# Patient Record
Sex: MALE | Race: WHITE | Employment: OTHER | URBAN - METROPOLITAN AREA
[De-identification: names, ages, dates, MRNs, and addresses within clinical notes are randomized per-mention and may not be internally consistent; named-entity substitution may affect disease eponyms.]

---

## 2019-09-02 ENCOUNTER — HOSPITAL ENCOUNTER (EMERGENCY)
Age: 84
Discharge: HOME | End: 2019-09-02
Attending: EMERGENCY MEDICINE
Payer: MEDICARE

## 2019-09-02 ENCOUNTER — APPOINTMENT (OUTPATIENT)
Dept: GENERAL RADIOLOGY | Age: 84
End: 2019-09-02
Payer: MEDICARE

## 2019-09-02 VITALS
OXYGEN SATURATION: 95 % | BODY MASS INDEX: 26.46 KG/M2 | SYSTOLIC BLOOD PRESSURE: 137 MMHG | HEIGHT: 64 IN | HEART RATE: 76 BPM | RESPIRATION RATE: 16 BRPM | TEMPERATURE: 98.3 F | WEIGHT: 155 LBS | DIASTOLIC BLOOD PRESSURE: 73 MMHG

## 2019-09-02 DIAGNOSIS — K64.4 HEMORRHOIDS, EXTERNAL: Primary | ICD-10-CM

## 2019-09-02 DIAGNOSIS — K62.5 RECTAL BLEEDING: ICD-10-CM

## 2019-09-02 LAB
ABO/RH: NORMAL
ABO/RH: NORMAL
ALBUMIN SERPL-MCNC: 3.5 G/DL (ref 3.5–5.2)
ALP BLD-CCNC: 52 U/L (ref 40–129)
ALT SERPL-CCNC: 9 U/L (ref 0–40)
ANION GAP SERPL CALCULATED.3IONS-SCNC: 13 MMOL/L (ref 7–16)
ANTIBODY SCREEN: NORMAL
AST SERPL-CCNC: 17 U/L (ref 0–39)
BASOPHILS ABSOLUTE: 0.09 E9/L (ref 0–0.2)
BASOPHILS RELATIVE PERCENT: 1 % (ref 0–2)
BILIRUB SERPL-MCNC: 0.5 MG/DL (ref 0–1.2)
BUN BLDV-MCNC: 13 MG/DL (ref 8–23)
CALCIUM SERPL-MCNC: 9.6 MG/DL (ref 8.6–10.2)
CHLORIDE BLD-SCNC: 99 MMOL/L (ref 98–107)
CO2: 21 MMOL/L (ref 22–29)
CREAT SERPL-MCNC: 0.9 MG/DL (ref 0.7–1.2)
EOSINOPHILS ABSOLUTE: 0.09 E9/L (ref 0.05–0.5)
EOSINOPHILS RELATIVE PERCENT: 1 % (ref 0–6)
GFR AFRICAN AMERICAN: >60
GFR NON-AFRICAN AMERICAN: >60 ML/MIN/1.73
GLUCOSE BLD-MCNC: 99 MG/DL (ref 74–99)
HCT VFR BLD CALC: 35.5 % (ref 37–54)
HEMOGLOBIN: 10.9 G/DL (ref 12.5–16.5)
INR BLD: 1.1
LACTIC ACID: 1.1 MMOL/L (ref 0.5–2.2)
LYMPHOCYTES ABSOLUTE: 0.09 E9/L (ref 1.5–4)
LYMPHOCYTES RELATIVE PERCENT: 1 % (ref 20–42)
MCH RBC QN AUTO: 29.2 PG (ref 26–35)
MCHC RBC AUTO-ENTMCNC: 30.7 % (ref 32–34.5)
MCV RBC AUTO: 95.2 FL (ref 80–99.9)
MONOCYTES ABSOLUTE: 1.67 E9/L (ref 0.1–0.95)
MONOCYTES RELATIVE PERCENT: 18 % (ref 2–12)
NEUTROPHILS ABSOLUTE: 7.35 E9/L (ref 1.8–7.3)
NEUTROPHILS RELATIVE PERCENT: 79 % (ref 43–80)
PDW BLD-RTO: 14.6 FL (ref 11.5–15)
PLATELET # BLD: 104 E9/L (ref 130–450)
PMV BLD AUTO: 13.3 FL (ref 7–12)
POLYCHROMASIA: ABNORMAL
POTASSIUM SERPL-SCNC: 3.8 MMOL/L (ref 3.5–5)
PROTHROMBIN TIME: 12.5 SEC (ref 9.3–12.4)
RBC # BLD: 3.73 E12/L (ref 3.8–5.8)
SODIUM BLD-SCNC: 133 MMOL/L (ref 132–146)
TOTAL PROTEIN: 7.6 G/DL (ref 6.4–8.3)
WBC # BLD: 9.3 E9/L (ref 4.5–11.5)

## 2019-09-02 PROCEDURE — 93005 ELECTROCARDIOGRAM TRACING: CPT | Performed by: PHYSICIAN ASSISTANT

## 2019-09-02 PROCEDURE — 99285 EMERGENCY DEPT VISIT HI MDM: CPT

## 2019-09-02 PROCEDURE — 80053 COMPREHEN METABOLIC PANEL: CPT

## 2019-09-02 PROCEDURE — 85610 PROTHROMBIN TIME: CPT

## 2019-09-02 PROCEDURE — 83605 ASSAY OF LACTIC ACID: CPT

## 2019-09-02 PROCEDURE — 85025 COMPLETE CBC W/AUTO DIFF WBC: CPT

## 2019-09-02 PROCEDURE — 86901 BLOOD TYPING SEROLOGIC RH(D): CPT

## 2019-09-02 PROCEDURE — 86900 BLOOD TYPING SEROLOGIC ABO: CPT

## 2019-09-02 PROCEDURE — 86850 RBC ANTIBODY SCREEN: CPT

## 2019-09-02 PROCEDURE — 2580000003 HC RX 258: Performed by: PHYSICIAN ASSISTANT

## 2019-09-02 PROCEDURE — 74018 RADEX ABDOMEN 1 VIEW: CPT

## 2019-09-02 RX ORDER — POLYETHYLENE GLYCOL 3350 17 G/17G
17 POWDER, FOR SOLUTION ORAL DAILY
Qty: 510 G | Refills: 0 | Status: SHIPPED | OUTPATIENT
Start: 2019-09-02 | End: 2019-10-02

## 2019-09-02 RX ORDER — DOCUSATE SODIUM 100 MG/1
100 CAPSULE, LIQUID FILLED ORAL 2 TIMES DAILY
Qty: 60 CAPSULE | Refills: 0 | Status: SHIPPED | OUTPATIENT
Start: 2019-09-02 | End: 2019-10-02

## 2019-09-02 RX ORDER — 0.9 % SODIUM CHLORIDE 0.9 %
1000 INTRAVENOUS SOLUTION INTRAVENOUS ONCE
Status: COMPLETED | OUTPATIENT
Start: 2019-09-02 | End: 2019-09-02

## 2019-09-02 RX ADMIN — SODIUM CHLORIDE 1000 ML: 9 INJECTION, SOLUTION INTRAVENOUS at 12:01

## 2019-09-02 ASSESSMENT — PAIN SCALES - GENERAL: PAINLEVEL_OUTOF10: 4

## 2019-09-03 LAB
EKG ATRIAL RATE: 78 BPM
EKG P AXIS: 52 DEGREES
EKG P-R INTERVAL: 202 MS
EKG Q-T INTERVAL: 344 MS
EKG QRS DURATION: 68 MS
EKG QTC CALCULATION (BAZETT): 392 MS
EKG R AXIS: 13 DEGREES
EKG T AXIS: 32 DEGREES
EKG VENTRICULAR RATE: 78 BPM

## 2019-09-03 PROCEDURE — 93010 ELECTROCARDIOGRAM REPORT: CPT | Performed by: INTERNAL MEDICINE

## 2019-09-20 ENCOUNTER — APPOINTMENT (OUTPATIENT)
Dept: CT IMAGING | Age: 84
End: 2019-09-20
Payer: MEDICARE

## 2019-09-20 ENCOUNTER — HOSPITAL ENCOUNTER (EMERGENCY)
Age: 84
Discharge: HOME | End: 2019-09-20
Attending: EMERGENCY MEDICINE
Payer: MEDICARE

## 2019-09-20 ENCOUNTER — APPOINTMENT (OUTPATIENT)
Dept: GENERAL RADIOLOGY | Age: 84
End: 2019-09-20
Payer: MEDICARE

## 2019-09-20 VITALS
BODY MASS INDEX: 26.46 KG/M2 | HEART RATE: 66 BPM | SYSTOLIC BLOOD PRESSURE: 136 MMHG | HEIGHT: 64 IN | WEIGHT: 155 LBS | RESPIRATION RATE: 16 BRPM | TEMPERATURE: 97.5 F | OXYGEN SATURATION: 94 % | DIASTOLIC BLOOD PRESSURE: 72 MMHG
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
## 2019-09-20 DIAGNOSIS — D61.818 PANCYTOPENIA (HCC): ICD-10-CM

## 2019-09-20 DIAGNOSIS — F10.10 ALCOHOL ABUSE: ICD-10-CM
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
## 2019-09-20 DIAGNOSIS — R93.89 ABNORMAL FINDING OF DIAGNOSTIC IMAGING: ICD-10-CM

## 2019-09-20 LAB
ACETAMINOPHEN LEVEL: <5 MCG/ML (ref 10–30)
ALBUMIN SERPL-MCNC: 3.6 G/DL (ref 3.5–5.2)
ALP BLD-CCNC: 63 U/L (ref 40–129)
ALT SERPL-CCNC: 14 U/L (ref 0–40)
AMMONIA: <10 UMOL/L (ref 16–60)
AMPHETAMINE SCREEN, URINE: NOT DETECTED
ANION GAP SERPL CALCULATED.3IONS-SCNC: 16 MMOL/L (ref 7–16)
AST SERPL-CCNC: 24 U/L (ref 0–39)
BACTERIA: NORMAL /HPF
BARBITURATE SCREEN URINE: NOT DETECTED
BASOPHILS ABSOLUTE: 0.01 E9/L (ref 0–0.2)
BASOPHILS RELATIVE PERCENT: 0.3 % (ref 0–2)
BENZODIAZEPINE SCREEN, URINE: NOT DETECTED
BILIRUB SERPL-MCNC: 0.3 MG/DL (ref 0–1.2)
BILIRUBIN URINE: NEGATIVE
BLOOD, URINE: NORMAL
BUN BLDV-MCNC: 19 MG/DL (ref 8–23)
CALCIUM SERPL-MCNC: 9.4 MG/DL (ref 8.6–10.2)
CANNABINOID SCREEN URINE: NOT DETECTED
CHLORIDE BLD-SCNC: 98 MMOL/L (ref 98–107)
CLARITY: CLEAR
CO2: 21 MMOL/L (ref 22–29)
COCAINE METABOLITE SCREEN URINE: NOT DETECTED
COLOR: YELLOW
CREAT SERPL-MCNC: 0.8 MG/DL (ref 0.7–1.2)
EKG ATRIAL RATE: 55 BPM
EKG P AXIS: 58 DEGREES
EKG P-R INTERVAL: 240 MS
EKG Q-T INTERVAL: 434 MS
EKG QRS DURATION: 86 MS
EKG QTC CALCULATION (BAZETT): 415 MS
EKG R AXIS: 18 DEGREES
EKG T AXIS: 33 DEGREES
EKG VENTRICULAR RATE: 55 BPM
EOSINOPHILS ABSOLUTE: 0.01 E9/L (ref 0.05–0.5)
EOSINOPHILS RELATIVE PERCENT: 0.3 % (ref 0–6)
ETHANOL: 161 MG/DL (ref 0–0.08)
GFR AFRICAN AMERICAN: >60
GFR NON-AFRICAN AMERICAN: >60 ML/MIN/1.73
GLUCOSE BLD-MCNC: 101 MG/DL (ref 74–99)
GLUCOSE URINE: NEGATIVE MG/DL
HCT VFR BLD CALC: 34.4 % (ref 37–54)
HEMOGLOBIN: 10.6 G/DL (ref 12.5–16.5)
IMMATURE GRANULOCYTES #: 0.06 E9/L
IMMATURE GRANULOCYTES %: 1.6 % (ref 0–5)
INFLUENZA A BY PCR: NOT DETECTED
INFLUENZA B BY PCR: NOT DETECTED
INR BLD: 1.1
KETONES, URINE: NEGATIVE MG/DL
LEUKOCYTE ESTERASE, URINE: NEGATIVE
LYMPHOCYTES ABSOLUTE: 1.24 E9/L (ref 1.5–4)
LYMPHOCYTES RELATIVE PERCENT: 32.3 % (ref 20–42)
Lab: NORMAL
MCH RBC QN AUTO: 29.5 PG (ref 26–35)
MCHC RBC AUTO-ENTMCNC: 30.8 % (ref 32–34.5)
MCV RBC AUTO: 95.8 FL (ref 80–99.9)
METHADONE SCREEN, URINE: NOT DETECTED
MONOCYTES ABSOLUTE: 1.13 E9/L (ref 0.1–0.95)
MONOCYTES RELATIVE PERCENT: 29.4 % (ref 2–12)
NEUTROPHILS ABSOLUTE: 1.39 E9/L (ref 1.8–7.3)
NEUTROPHILS RELATIVE PERCENT: 36.1 % (ref 43–80)
NITRITE, URINE: NEGATIVE
OPIATE SCREEN URINE: NOT DETECTED
PDW BLD-RTO: 15 FL (ref 11.5–15)
PH UA: 5.5 (ref 5–9)
PHENCYCLIDINE SCREEN URINE: NOT DETECTED
PLATELET # BLD: 95 E9/L (ref 130–450)
PLATELET CONFIRMATION: NORMAL
PMV BLD AUTO: 12.2 FL (ref 7–12)
POTASSIUM SERPL-SCNC: 3.7 MMOL/L (ref 3.5–5)
PROPOXYPHENE SCREEN: NOT DETECTED
PROTEIN UA: NORMAL MG/DL
PROTHROMBIN TIME: 12.5 SEC (ref 9.3–12.4)
RBC # BLD: 3.59 E12/L (ref 3.8–5.8)
RBC UA: NORMAL /HPF (ref 0–2)
REASON FOR REJECTION: NORMAL
REJECTED TEST: NORMAL
SALICYLATE, SERUM: <0.3 MG/DL (ref 0–30)
SODIUM BLD-SCNC: 135 MMOL/L (ref 132–146)
SPECIFIC GRAVITY UA: 1.01 (ref 1–1.03)
TOTAL PROTEIN: 7.8 G/DL (ref 6.4–8.3)
TRICYCLIC ANTIDEPRESSANTS SCREEN SERUM: NEGATIVE NG/ML
TROPONIN: <0.01 NG/ML (ref 0–0.03)
UROBILINOGEN, URINE: 0.2 E.U./DL
WBC # BLD: 3.8 E9/L (ref 4.5–11.5)
WBC UA: NORMAL /HPF (ref 0–5)

## 2019-09-20 PROCEDURE — 82140 ASSAY OF AMMONIA: CPT

## 2019-09-20 PROCEDURE — 99284 EMERGENCY DEPT VISIT MOD MDM: CPT

## 2019-09-20 PROCEDURE — 80307 DRUG TEST PRSMV CHEM ANLYZR: CPT

## 2019-09-20 PROCEDURE — G0480 DRUG TEST DEF 1-7 CLASSES: HCPCS

## 2019-09-20 PROCEDURE — 72125 CT NECK SPINE W/O DYE: CPT

## 2019-09-20 PROCEDURE — 96374 THER/PROPH/DIAG INJ IV PUSH: CPT

## 2019-09-20 PROCEDURE — 85610 PROTHROMBIN TIME: CPT

## 2019-09-20 PROCEDURE — 93010 ELECTROCARDIOGRAM REPORT: CPT | Performed by: INTERNAL MEDICINE

## 2019-09-20 PROCEDURE — 73502 X-RAY EXAM HIP UNI 2-3 VIEWS: CPT

## 2019-09-20 PROCEDURE — 6360000002 HC RX W HCPCS: Performed by: STUDENT IN AN ORGANIZED HEALTH CARE EDUCATION/TRAINING PROGRAM

## 2019-09-20 PROCEDURE — 71045 X-RAY EXAM CHEST 1 VIEW: CPT

## 2019-09-20 PROCEDURE — 70450 CT HEAD/BRAIN W/O DYE: CPT

## 2019-09-20 PROCEDURE — 6370000000 HC RX 637 (ALT 250 FOR IP): Performed by: STUDENT IN AN ORGANIZED HEALTH CARE EDUCATION/TRAINING PROGRAM

## 2019-09-20 PROCEDURE — 81001 URINALYSIS AUTO W/SCOPE: CPT

## 2019-09-20 PROCEDURE — 84484 ASSAY OF TROPONIN QUANT: CPT

## 2019-09-20 PROCEDURE — 80053 COMPREHEN METABOLIC PANEL: CPT

## 2019-09-20 PROCEDURE — 85025 COMPLETE CBC W/AUTO DIFF WBC: CPT

## 2019-09-20 PROCEDURE — 87502 INFLUENZA DNA AMP PROBE: CPT

## 2019-09-20 PROCEDURE — 93005 ELECTROCARDIOGRAM TRACING: CPT | Performed by: STUDENT IN AN ORGANIZED HEALTH CARE EDUCATION/TRAINING PROGRAM

## 2019-09-20 RX ORDER — FOLIC ACID 1 MG/1
1 TABLET ORAL ONCE
Status: COMPLETED | OUTPATIENT
Start: 2019-09-20 | End: 2019-09-20

## 2019-09-20 RX ORDER — THIAMINE HYDROCHLORIDE 100 MG/ML
100 INJECTION, SOLUTION INTRAMUSCULAR; INTRAVENOUS ONCE
Status: COMPLETED | OUTPATIENT
Start: 2019-09-20 | End: 2019-09-20

## 2019-09-20 RX ORDER — ALLOPURINOL 100 MG/1
100 TABLET ORAL DAILY
COMMUNITY
End: 2020-03-20

## 2019-09-20 RX ORDER — LISINOPRIL 10 MG/1
10 TABLET ORAL DAILY
COMMUNITY

## 2019-09-20 RX ADMIN — FOLIC ACID 1 MG: 1 TABLET ORAL at 18:08

## 2019-09-20 RX ADMIN — THIAMINE HYDROCHLORIDE 100 MG: 100 INJECTION, SOLUTION INTRAMUSCULAR; INTRAVENOUS at 18:08

## 2019-09-20 NOTE — ED NOTES
Bed: 05  Expected date:   Expected time:   Means of arrival:   Comments:  Pablo     Allen Tita, BARBARA  69[birth year]28[birth year] [id number]

## 2019-09-20 NOTE — ED PROVIDER NOTES
Patient is an 40-year-old male who presents to ED for evaluation of fall, fatigue. Onset of symptoms earlier this a.m. Patient describes the fall as mechanical in nature. Fall was witnessed by family members. Denies loss of consciousness or trauma to the head/neck. Patient is not on anticoagulation. Patient notes that he was unable to stand after fall secondary to increased fatigue. Denies unilateral weakness, numbness or paresthesias. Denies any pain acute pain. Notes chronic right hip pain unchanged from baseline. Patient is accompanied by wife and son who note that patient has been slurring his words, however, is consistent with his daily alcohol use. Patient notes that he had consumed alcohol-- 2-3 drinks of gin, unable to specify number of ounces. Patient notes that he typically consumes similar amount of alcohol daily. Denies associated diaphoresis, tremor, anxiety or palpitations consistent with acute alcohol withdrawal. Denies any suicidal or homicidal ideations. Denies ingestion of any other substances prior to arrival.     The history is provided by the patient, the spouse, a relative and medical records. Fall   The accident occurred 1 to 2 hours ago. The fall occurred while walking. He landed on a hard floor. Pain location: No acute pain. The patient is experiencing no pain. There was no entrapment after the fall. There was no drug use involved in the accident. There was alcohol use involved in the accident. Pertinent negatives include no visual change, no fever, no numbness, no abdominal pain, no nausea, no vomiting, no hematuria, no headaches, no loss of consciousness and no tingling. He has tried nothing for the symptoms. Review of Systems   Constitutional: Negative for chills, diaphoresis, fatigue and fever. HENT: Negative for congestion, ear pain, rhinorrhea, sinus pressure, sneezing, sore throat and trouble swallowing. Eyes: Negative for photophobia, pain and redness. Respiratory: Negative for cough, chest tightness, shortness of breath and wheezing. Cardiovascular: Negative for chest pain and palpitations. Gastrointestinal: Negative for abdominal distention, abdominal pain, blood in stool, constipation, diarrhea, nausea and vomiting. Endocrine: Negative for polydipsia and polyuria. Genitourinary: Negative for difficulty urinating, dysuria, flank pain, hematuria and urgency. Musculoskeletal: Negative for arthralgias, back pain, myalgias and neck pain. Skin: Negative for rash and wound. Neurological: Negative for tingling, loss of consciousness, weakness, light-headedness, numbness and headaches. Psychiatric/Behavioral: Negative for agitation and confusion. The patient is not nervous/anxious and is not hyperactive. Physical Exam   Constitutional: He is oriented to person, place, and time. He appears well-developed and well-nourished. No distress. Odor of alcoholic beverage noted on breath    HENT:   Head: Normocephalic and atraumatic. Right Ear: External ear normal.   Left Ear: External ear normal.   Mouth/Throat: Oropharynx is clear and moist. No oropharyngeal exudate. No overlying ecchymosis to mastoid region. No hemotympanum. No lacerations, abrasions, step off or deformity noted to the head. Eyes: Pupils are equal, round, and reactive to light. Conjunctivae and EOM are normal. Right eye exhibits no discharge. Left eye exhibits no discharge. Neck: Normal range of motion. Neck supple. No thyromegaly present. No spinous process tenderness to palpation, step off or deformity. No signs of trauma to the neck. Cardiovascular: Normal rate, regular rhythm and normal heart sounds. No murmur heard. Pulmonary/Chest: Effort normal and breath sounds normal. No respiratory distress. He has no wheezes. He has no rales. He exhibits no tenderness. Abdominal: Soft. He exhibits no distension and no mass. There is no tenderness.  There is no rebound

## 2019-09-21 ASSESSMENT — ENCOUNTER SYMPTOMS
DIARRHEA: 0
SORE THROAT: 0
EYE REDNESS: 0
VISUAL CHANGE: 0
ABDOMINAL PAIN: 0
ABDOMINAL DISTENTION: 0
CHEST TIGHTNESS: 0
PHOTOPHOBIA: 0
TROUBLE SWALLOWING: 0
BACK PAIN: 0
SHORTNESS OF BREATH: 0
SINUS PRESSURE: 0
NAUSEA: 0
COUGH: 0
VOMITING: 0
WHEEZING: 0
CONSTIPATION: 0
EYE PAIN: 0
RHINORRHEA: 0
BLOOD IN STOOL: 0

## 2020-03-20 ENCOUNTER — HOSPITAL ENCOUNTER (INPATIENT)
Age: 85
LOS: 11 days | Discharge: HOME | DRG: 177 | End: 2020-03-31
Attending: EMERGENCY MEDICINE | Admitting: INTERNAL MEDICINE
Payer: MEDICARE

## 2020-03-20 ENCOUNTER — APPOINTMENT (OUTPATIENT)
Dept: CT IMAGING | Age: 85
DRG: 177 | End: 2020-03-20
Payer: MEDICARE

## 2020-03-20 ENCOUNTER — APPOINTMENT (OUTPATIENT)
Dept: GENERAL RADIOLOGY | Age: 85
DRG: 177 | End: 2020-03-20
Payer: MEDICARE

## 2020-03-20 PROBLEM — I10 HTN (HYPERTENSION): Status: ACTIVE | Noted: 2020-03-20

## 2020-03-20 PROBLEM — N17.9 ACUTE KIDNEY INJURY (HCC): Status: ACTIVE | Noted: 2020-03-20

## 2020-03-20 PROBLEM — J18.9 PNEUMONIA: Status: ACTIVE | Noted: 2020-03-20

## 2020-03-20 PROBLEM — R73.9 ACUTE HYPERGLYCEMIA: Status: ACTIVE | Noted: 2020-03-20

## 2020-03-20 PROBLEM — D64.9 ANEMIA: Status: ACTIVE | Noted: 2020-03-20

## 2020-03-20 LAB
ADENOVIRUS BY PCR: NOT DETECTED
ALBUMIN SERPL-MCNC: 3.2 G/DL (ref 3.5–5.2)
ALP BLD-CCNC: 64 U/L (ref 40–129)
ALT SERPL-CCNC: 10 U/L (ref 0–40)
AMORPHOUS: ABNORMAL
ANION GAP SERPL CALCULATED.3IONS-SCNC: 19 MMOL/L (ref 7–16)
AST SERPL-CCNC: 26 U/L (ref 0–39)
BACTERIA: ABNORMAL /HPF
BASOPHILS ABSOLUTE: 0 E9/L (ref 0–0.2)
BASOPHILS RELATIVE PERCENT: 0 % (ref 0–2)
BILIRUB SERPL-MCNC: 0.4 MG/DL (ref 0–1.2)
BILIRUBIN URINE: NEGATIVE
BLOOD, URINE: ABNORMAL
BORDETELLA PARAPERTUSSIS BY PCR: NOT DETECTED
BORDETELLA PERTUSSIS BY PCR: NOT DETECTED
BUN BLDV-MCNC: 38 MG/DL (ref 8–23)
CALCIUM SERPL-MCNC: 8.6 MG/DL (ref 8.6–10.2)
CHLAMYDOPHILIA PNEUMONIAE BY PCR: NOT DETECTED
CHLORIDE BLD-SCNC: 99 MMOL/L (ref 98–107)
CK MB: 1.8 NG/ML (ref 0–7.7)
CK MB: 1.8 NG/ML (ref 0–7.7)
CLARITY: CLEAR
CO2: 18 MMOL/L (ref 22–29)
COLOR: YELLOW
CORONAVIRUS 229E BY PCR: NOT DETECTED
CORONAVIRUS HKU1 BY PCR: NOT DETECTED
CORONAVIRUS NL63 BY PCR: NOT DETECTED
CORONAVIRUS OC43 BY PCR: NOT DETECTED
CREAT SERPL-MCNC: 1.5 MG/DL (ref 0.7–1.2)
EKG ATRIAL RATE: 66 BPM
EKG P AXIS: 62 DEGREES
EKG P-R INTERVAL: 192 MS
EKG Q-T INTERVAL: 382 MS
EKG QRS DURATION: 80 MS
EKG QTC CALCULATION (BAZETT): 400 MS
EKG R AXIS: 44 DEGREES
EKG T AXIS: 64 DEGREES
EKG VENTRICULAR RATE: 66 BPM
EOSINOPHILS ABSOLUTE: 0 E9/L (ref 0.05–0.5)
EOSINOPHILS RELATIVE PERCENT: 0 % (ref 0–6)
FERRITIN: 1052 NG/ML
FOLATE: 12.3 NG/ML (ref 4.8–24.2)
GFR AFRICAN AMERICAN: 53
GFR NON-AFRICAN AMERICAN: 44 ML/MIN/1.73
GLUCOSE BLD-MCNC: 117 MG/DL (ref 74–99)
GLUCOSE URINE: NEGATIVE MG/DL
HCT VFR BLD CALC: 25.9 % (ref 37–54)
HEMOGLOBIN: 7.6 G/DL (ref 12.5–16.5)
HUMAN METAPNEUMOVIRUS BY PCR: NOT DETECTED
HUMAN RHINOVIRUS/ENTEROVIRUS BY PCR: NOT DETECTED
IMMATURE GRANULOCYTES #: 0.28 E9/L
IMMATURE GRANULOCYTES %: 3.5 % (ref 0–5)
INFLUENZA A BY PCR: NOT DETECTED
INFLUENZA A BY PCR: NOT DETECTED
INFLUENZA B BY PCR: NOT DETECTED
INFLUENZA B BY PCR: NOT DETECTED
KETONES, URINE: NEGATIVE MG/DL
L. PNEUMOPHILA SEROGP 1 UR AG: NORMAL
LACTIC ACID, SEPSIS: 0.9 MMOL/L (ref 0.5–1.9)
LEUKOCYTE ESTERASE, URINE: NEGATIVE
LIPASE: 280 U/L (ref 13–60)
LYMPHOCYTES ABSOLUTE: 1.16 E9/L (ref 1.5–4)
LYMPHOCYTES RELATIVE PERCENT: 14.6 % (ref 20–42)
MCH RBC QN AUTO: 27.1 PG (ref 26–35)
MCHC RBC AUTO-ENTMCNC: 29.3 % (ref 32–34.5)
MCV RBC AUTO: 92.5 FL (ref 80–99.9)
MONOCYTES ABSOLUTE: 2.13 E9/L (ref 0.1–0.95)
MONOCYTES RELATIVE PERCENT: 26.8 % (ref 2–12)
MYCOPLASMA PNEUMONIAE BY PCR: NOT DETECTED
NEUTROPHILS ABSOLUTE: 4.39 E9/L (ref 1.8–7.3)
NEUTROPHILS RELATIVE PERCENT: 55.1 % (ref 43–80)
NITRITE, URINE: NEGATIVE
PARAINFLUENZA VIRUS 1 BY PCR: NOT DETECTED
PARAINFLUENZA VIRUS 2 BY PCR: NOT DETECTED
PARAINFLUENZA VIRUS 3 BY PCR: NOT DETECTED
PARAINFLUENZA VIRUS 4 BY PCR: NOT DETECTED
PDW BLD-RTO: 16.8 FL (ref 11.5–15)
PH UA: 5.5 (ref 5–9)
PLATELET # BLD: 84 E9/L (ref 130–450)
PLATELET CONFIRMATION: NORMAL
PMV BLD AUTO: 13.6 FL (ref 7–12)
POTASSIUM REFLEX MAGNESIUM: 4.1 MMOL/L (ref 3.5–5)
PROCALCITONIN: 0.59 NG/ML (ref 0–0.08)
PROTEIN UA: 100 MG/DL
RBC # BLD: 2.8 E12/L (ref 3.8–5.8)
RBC UA: ABNORMAL /HPF (ref 0–2)
RESPIRATORY SYNCYTIAL VIRUS BY PCR: NOT DETECTED
SODIUM BLD-SCNC: 136 MMOL/L (ref 132–146)
SPECIFIC GRAVITY UA: 1.02 (ref 1–1.03)
STREP PNEUMONIAE ANTIGEN, URINE: NORMAL
TOTAL CK: 173 U/L (ref 20–200)
TOTAL CK: 186 U/L (ref 20–200)
TOTAL PROTEIN: 7.3 G/DL (ref 6.4–8.3)
TROPONIN: 0.08 NG/ML (ref 0–0.03)
UROBILINOGEN, URINE: 0.2 E.U./DL
VITAMIN B-12: 610 PG/ML (ref 211–946)
WBC # BLD: 8 E9/L (ref 4.5–11.5)
WBC UA: ABNORMAL /HPF (ref 0–5)

## 2020-03-20 PROCEDURE — 71250 CT THORAX DX C-: CPT

## 2020-03-20 PROCEDURE — 87450 HC DIRECT STREP B ANTIGEN: CPT

## 2020-03-20 PROCEDURE — 6360000002 HC RX W HCPCS: Performed by: EMERGENCY MEDICINE

## 2020-03-20 PROCEDURE — 71045 X-RAY EXAM CHEST 1 VIEW: CPT

## 2020-03-20 PROCEDURE — 74177 CT ABD & PELVIS W/CONTRAST: CPT

## 2020-03-20 PROCEDURE — 94761 N-INVAS EAR/PLS OXIMETRY MLT: CPT

## 2020-03-20 PROCEDURE — 93005 ELECTROCARDIOGRAM TRACING: CPT | Performed by: EMERGENCY MEDICINE

## 2020-03-20 PROCEDURE — 84484 ASSAY OF TROPONIN QUANT: CPT

## 2020-03-20 PROCEDURE — 84145 PROCALCITONIN (PCT): CPT

## 2020-03-20 PROCEDURE — 80053 COMPREHEN METABOLIC PANEL: CPT

## 2020-03-20 PROCEDURE — 87040 BLOOD CULTURE FOR BACTERIA: CPT

## 2020-03-20 PROCEDURE — 82728 ASSAY OF FERRITIN: CPT

## 2020-03-20 PROCEDURE — 82550 ASSAY OF CK (CPK): CPT

## 2020-03-20 PROCEDURE — 6370000000 HC RX 637 (ALT 250 FOR IP): Performed by: EMERGENCY MEDICINE

## 2020-03-20 PROCEDURE — 82553 CREATINE MB FRACTION: CPT

## 2020-03-20 PROCEDURE — 99223 1ST HOSP IP/OBS HIGH 75: CPT | Performed by: INTERNAL MEDICINE

## 2020-03-20 PROCEDURE — 85025 COMPLETE CBC W/AUTO DIFF WBC: CPT

## 2020-03-20 PROCEDURE — APPSS45 APP SPLIT SHARED TIME 31-45 MINUTES: Performed by: NURSE PRACTITIONER

## 2020-03-20 PROCEDURE — 82607 VITAMIN B-12: CPT

## 2020-03-20 PROCEDURE — 99285 EMERGENCY DEPT VISIT HI MDM: CPT

## 2020-03-20 PROCEDURE — 96365 THER/PROPH/DIAG IV INF INIT: CPT

## 2020-03-20 PROCEDURE — 83690 ASSAY OF LIPASE: CPT

## 2020-03-20 PROCEDURE — 82746 ASSAY OF FOLIC ACID SERUM: CPT

## 2020-03-20 PROCEDURE — 96366 THER/PROPH/DIAG IV INF ADDON: CPT

## 2020-03-20 PROCEDURE — 2580000003 HC RX 258: Performed by: EMERGENCY MEDICINE

## 2020-03-20 PROCEDURE — 6360000002 HC RX W HCPCS: Performed by: NURSE PRACTITIONER

## 2020-03-20 PROCEDURE — 36415 COLL VENOUS BLD VENIPUNCTURE: CPT

## 2020-03-20 PROCEDURE — 1200000000 HC SEMI PRIVATE

## 2020-03-20 PROCEDURE — 93010 ELECTROCARDIOGRAM REPORT: CPT | Performed by: INTERNAL MEDICINE

## 2020-03-20 PROCEDURE — 6370000000 HC RX 637 (ALT 250 FOR IP): Performed by: NURSE PRACTITIONER

## 2020-03-20 PROCEDURE — 6370000000 HC RX 637 (ALT 250 FOR IP): Performed by: INTERNAL MEDICINE

## 2020-03-20 PROCEDURE — 6360000004 HC RX CONTRAST MEDICATION: Performed by: RADIOLOGY

## 2020-03-20 PROCEDURE — 83605 ASSAY OF LACTIC ACID: CPT

## 2020-03-20 PROCEDURE — 81001 URINALYSIS AUTO W/SCOPE: CPT

## 2020-03-20 PROCEDURE — 2580000003 HC RX 258: Performed by: NURSE PRACTITIONER

## 2020-03-20 PROCEDURE — 0100U HC RESPIRPTHGN MULT REV TRANS & AMP PRB TECH 21 TRGT: CPT

## 2020-03-20 PROCEDURE — 87502 INFLUENZA DNA AMP PROBE: CPT

## 2020-03-20 PROCEDURE — 96375 TX/PRO/DX INJ NEW DRUG ADDON: CPT

## 2020-03-20 RX ORDER — DOCUSATE SODIUM 100 MG/1
100 CAPSULE, LIQUID FILLED ORAL 2 TIMES DAILY PRN
Status: ON HOLD | COMMUNITY
End: 2020-08-14 | Stop reason: HOSPADM

## 2020-03-20 RX ORDER — ONDANSETRON 2 MG/ML
4 INJECTION INTRAMUSCULAR; INTRAVENOUS EVERY 6 HOURS PRN
Status: DISCONTINUED | OUTPATIENT
Start: 2020-03-20 | End: 2020-03-31 | Stop reason: HOSPADM

## 2020-03-20 RX ORDER — DOXAZOSIN 2 MG/1
2 TABLET ORAL DAILY
Status: DISCONTINUED | OUTPATIENT
Start: 2020-03-20 | End: 2020-03-31 | Stop reason: HOSPADM

## 2020-03-20 RX ORDER — 0.9 % SODIUM CHLORIDE 0.9 %
1000 INTRAVENOUS SOLUTION INTRAVENOUS ONCE
Status: COMPLETED | OUTPATIENT
Start: 2020-03-20 | End: 2020-03-20

## 2020-03-20 RX ORDER — LISINOPRIL 5 MG/1
5 TABLET ORAL DAILY
Status: DISCONTINUED | OUTPATIENT
Start: 2020-03-20 | End: 2020-03-23

## 2020-03-20 RX ORDER — DOCUSATE SODIUM 100 MG/1
100 CAPSULE, LIQUID FILLED ORAL 2 TIMES DAILY PRN
Status: DISCONTINUED | OUTPATIENT
Start: 2020-03-20 | End: 2020-03-31 | Stop reason: HOSPADM

## 2020-03-20 RX ORDER — LATANOPROST 50 UG/ML
1 SOLUTION/ DROPS OPHTHALMIC NIGHTLY
COMMUNITY

## 2020-03-20 RX ORDER — IPRATROPIUM BROMIDE AND ALBUTEROL SULFATE 2.5; .5 MG/3ML; MG/3ML
1 SOLUTION RESPIRATORY (INHALATION) EVERY 4 HOURS PRN
Status: DISCONTINUED | OUTPATIENT
Start: 2020-03-20 | End: 2020-03-21

## 2020-03-20 RX ORDER — SODIUM CHLORIDE 0.9 % (FLUSH) 0.9 %
10 SYRINGE (ML) INJECTION PRN
Status: DISCONTINUED | OUTPATIENT
Start: 2020-03-20 | End: 2020-03-31 | Stop reason: HOSPADM

## 2020-03-20 RX ORDER — LISINOPRIL 10 MG/1
10 TABLET ORAL DAILY
Status: DISCONTINUED | OUTPATIENT
Start: 2020-03-20 | End: 2020-03-20

## 2020-03-20 RX ORDER — SODIUM CHLORIDE 0.9 % (FLUSH) 0.9 %
10 SYRINGE (ML) INJECTION EVERY 12 HOURS SCHEDULED
Status: DISCONTINUED | OUTPATIENT
Start: 2020-03-20 | End: 2020-03-31 | Stop reason: HOSPADM

## 2020-03-20 RX ORDER — ACETAMINOPHEN 325 MG/1
650 TABLET ORAL EVERY 6 HOURS PRN
Status: DISCONTINUED | OUTPATIENT
Start: 2020-03-20 | End: 2020-03-31 | Stop reason: HOSPADM

## 2020-03-20 RX ORDER — PROMETHAZINE HYDROCHLORIDE 25 MG/1
12.5 TABLET ORAL EVERY 6 HOURS PRN
Status: DISCONTINUED | OUTPATIENT
Start: 2020-03-20 | End: 2020-03-31 | Stop reason: HOSPADM

## 2020-03-20 RX ORDER — ACETAMINOPHEN 650 MG/1
650 SUPPOSITORY RECTAL EVERY 6 HOURS PRN
Status: DISCONTINUED | OUTPATIENT
Start: 2020-03-20 | End: 2020-03-31 | Stop reason: HOSPADM

## 2020-03-20 RX ORDER — POLYETHYLENE GLYCOL 3350 17 G/17G
17 POWDER, FOR SOLUTION ORAL DAILY PRN
Status: DISCONTINUED | OUTPATIENT
Start: 2020-03-20 | End: 2020-03-31 | Stop reason: HOSPADM

## 2020-03-20 RX ORDER — SODIUM CHLORIDE 9 MG/ML
INJECTION, SOLUTION INTRAVENOUS CONTINUOUS
Status: DISCONTINUED | OUTPATIENT
Start: 2020-03-20 | End: 2020-03-25

## 2020-03-20 RX ORDER — TERAZOSIN 2 MG/1
2 CAPSULE ORAL NIGHTLY
Status: ON HOLD | COMMUNITY
End: 2020-04-28 | Stop reason: HOSPADM

## 2020-03-20 RX ORDER — LATANOPROST 50 UG/ML
1 SOLUTION/ DROPS OPHTHALMIC NIGHTLY
Status: DISCONTINUED | OUTPATIENT
Start: 2020-03-20 | End: 2020-03-31 | Stop reason: HOSPADM

## 2020-03-20 RX ORDER — ACETAMINOPHEN 500 MG
1000 TABLET ORAL ONCE
Status: COMPLETED | OUTPATIENT
Start: 2020-03-20 | End: 2020-03-20

## 2020-03-20 RX ADMIN — ACETAMINOPHEN 1000 MG: 500 TABLET ORAL at 07:45

## 2020-03-20 RX ADMIN — DOXAZOSIN 2 MG: 2 TABLET ORAL at 15:51

## 2020-03-20 RX ADMIN — SODIUM CHLORIDE 1000 ML: 9 INJECTION, SOLUTION INTRAVENOUS at 07:49

## 2020-03-20 RX ADMIN — Medication 10 ML: at 21:45

## 2020-03-20 RX ADMIN — WATER 1 G: 1 INJECTION INTRAMUSCULAR; INTRAVENOUS; SUBCUTANEOUS at 09:19

## 2020-03-20 RX ADMIN — IOPAMIDOL 110 ML: 755 INJECTION, SOLUTION INTRAVENOUS at 08:33

## 2020-03-20 RX ADMIN — LISINOPRIL 5 MG: 5 TABLET ORAL at 21:45

## 2020-03-20 RX ADMIN — ENOXAPARIN SODIUM 30 MG: 30 INJECTION SUBCUTANEOUS at 15:51

## 2020-03-20 RX ADMIN — LATANOPROST 1 DROP: 50 SOLUTION OPHTHALMIC at 21:45

## 2020-03-20 RX ADMIN — SODIUM CHLORIDE: 9 INJECTION, SOLUTION INTRAVENOUS at 15:42

## 2020-03-20 RX ADMIN — ACETAMINOPHEN 650 MG: 325 TABLET ORAL at 21:45

## 2020-03-20 RX ADMIN — AZITHROMYCIN MONOHYDRATE 500 MG: 500 INJECTION, POWDER, LYOPHILIZED, FOR SOLUTION INTRAVENOUS at 09:26

## 2020-03-20 ASSESSMENT — ENCOUNTER SYMPTOMS
NAUSEA: 0
WHEEZING: 0
SINUS PRESSURE: 0
VOMITING: 0
BACK PAIN: 0
SHORTNESS OF BREATH: 1
EYE DISCHARGE: 0
SORE THROAT: 0
DIARRHEA: 1
ABDOMINAL PAIN: 0
EYE REDNESS: 0
COUGH: 1
EYE PAIN: 0

## 2020-03-20 ASSESSMENT — PAIN SCALES - GENERAL
PAINLEVEL_OUTOF10: 0
PAINLEVEL_OUTOF10: 4
PAINLEVEL_OUTOF10: 0
PAINLEVEL_OUTOF10: 0

## 2020-03-20 NOTE — ED NOTES
Pt has mask on that was applied by squad.  Pt placed in isolation with negative air flow     Judith Lucero RN  03/20/20 [id number]

## 2020-03-20 NOTE — ED PROVIDER NOTES
Patient is a 78-year-old male with a past medical history of hypertension presenting to the emergency department with fever and cough. Symptoms moderate in severity. Patient states symptoms started on Saturday with a productive cough and constant since. They have progressed since then and noticed fever starting yesterday. He has taken Tylenol at home and last dose was yesterday. Does not feel short of breath unless he is having a coughing attack. Lives at home with his wife and son. No sick contacts. No recent travel or contact with COVID19 positive patient. He states he has not left his house. Nothing makes his symptoms worse, Robitussin makes his symptoms mildly better. Denies any chest pain, nausea, vomiting, abdominal pain. He did have diarrhea 3 days ago but that is since resolved. Review of Systems   Constitutional: Positive for chills and fever. HENT: Negative for ear pain, sinus pressure and sore throat. Eyes: Negative for pain, discharge and redness. Respiratory: Positive for cough and shortness of breath. Negative for wheezing. Cardiovascular: Negative for chest pain and leg swelling. Gastrointestinal: Positive for diarrhea. Negative for abdominal pain, nausea and vomiting. Genitourinary: Negative for dysuria and frequency. Musculoskeletal: Negative for arthralgias and back pain. Skin: Negative for rash and wound. Neurological: Negative for weakness and headaches. Hematological: Negative for adenopathy. All other systems reviewed and are negative. Physical Exam  Vitals signs and nursing note reviewed. Constitutional:       General: He is not in acute distress. Appearance: He is well-developed. He is ill-appearing. Comments: Shivering   HENT:      Head: Normocephalic and atraumatic. Eyes:      Pupils: Pupils are equal, round, and reactive to light. Neck:      Musculoskeletal: Normal range of motion and neck supple.    Cardiovascular: time.  EKG without evidence of acute STEMI. Patient was flu negative, respiratory panel added on. Lipase was elevated to 80. Patient is a drinker, but him and wife deny everyday drinking. He is having no abdominal pain. CT of the chest was performed as well as CT of the abdomen secondary to his increased lipase and diarrhea a few days prior. CT demonstrated opacities at both lung bases. Suspect likely combination of atelectasis and infiltrate especially on the left. There is also trace pleural effusion. CT of the abdomen redemonstrated the bilateral infiltrates as well as hepatomegaly with possible liver disease. On repeat exam he still had no abdominal pain, no right upper quadrant tenderness. With patient symptoms and CT demonstrating bilateral pneumonia, symptoms most likely secondary to this. COVID19 was considered but with patient's diagnosis of bilateral pneumonia, testing has not been performed. Educated patient and wife about symptoms, diagnosis and need to stay in the hospital for further evaluation and care. Case discussed with Dr. Shola Henley and with patient having a source of his respiratory symptoms, he does not need to go to the [facility] floor. Patient had seen Dr. Jean Marie Goldman in the past but has not seen him for 5 years and [facility] Consult placed to hospitalist for admission who accepted. Informed patient and wife of current diagnosis and plan for admission. They verbalized understanding and were agreeable. Patient was admitted. ED Course as of Mar 20 1242   Fri Mar 20, 2020   0815 Most likely secondary to VALERIE. No chest pain at this current time. Troponin(!): 0.08 [KP]   [id number] Patient reevaluated, wife is here. He is ANO x3. He states nobody at home has any similar symptoms. No one at home is sick. They have no sick contacts. [KP]   0844 Reevaluated, and her x3. Lethargic but arousable.   Updated wife and patient about work-up and need to stay in the hospital.  They verbalized understanding are agreeable with the plan. []      ED Course User Index  [] Marifer Villar, DO        ----------------------------------------------- PAST HISTORY --------------------------------------------  Past Medical History:  has a past medical history of Hypertension. Past Surgical History:  has a past surgical history that includes hernia repair; joint replacement (Bilateral); Carpal tunnel release (Bilateral); eye surgery; and back surgery. Social History:  reports that he quit smoking about 40 years ago. His smoking use included cigarettes. He has quit using smokeless tobacco. He reports current alcohol use of about 2.0 standard drinks of alcohol per week. He reports that he does not use drugs. Family History: family history includes Cancer in his father; Heart Attack in his brother. The patients home medications have been reviewed. Allergies: Patient has no known allergies.     ------------------------------------------------ RESULTS ---------------------------------------------------    LABS:  Results for orders placed or performed during the hospital encounter of 03/20/20   Rapid influenza A/B antigens   Result Value Ref Range    Influenza A by PCR Not Detected Not Detected    Influenza B by PCR Not Detected Not Detected   Respiratory Panel, Molecular   Result Value Ref Range    Adenovirus by PCR Not Detected Not Detected    Bordetella parapertussis by PCR Not Detected Not Detected    Bordetella pertussis by PCR Not Detected Not Detected    Chlamydophilia pneumoniae by PCR Not Detected Not Detected    Coronavirus 229E by PCR Not Detected Not Detected    Coronavirus HKU1 by PCR Not Detected Not Detected    Coronavirus NL63 by PCR Not Detected Not Detected    Coronavirus OC43 by PCR Not Detected Not Detected    Human Metapneumovirus by PCR Not Detected Not Detected    Human Rhinovirus/Enterovirus by PCR Not Detected Not Detected    Influenza A by PCR Not Detected Not Consultation:  I Spoke with Dr. Ovid Harada (Medicine). Discussed case. They will admit this patient. Emil Powell PROCEDURES:            none. COUNSELING:   I have spoken with the spouse and patient and discussed todays results, in addition to providing specific details for the plan of care and counseling regarding the diagnosis and prognosis.     --------------------------------------- IMPRESSION & DISPOSITION --------------------------------     IMPRESSION(s):  1. Pneumonia due to organism    2. VALERIE (acute kidney injury) ([facility] [address]    3. Elevated troponin    4. Thrombocytopenia ([facility] [address]    5. Anemia, unspecified type        This patient's ED course included: a personal history and physicial examination, re-evaluation prior to disposition, multiple bedside re-evaluations, IV medications, cardiac monitoring and continuous pulse oximetry    This patient has been closely monitored during their ED course. DISPOSITION:  Disposition: Admit to med/surg floor. Patient condition is serious. END OF PROVIDER NOTE.          Danny Quinones DO  Resident  03/20/20 0432

## 2020-03-20 NOTE — H&P
2020    BACTERIA FEW 2020    CLARITYU Clear 2020    SPECGRAV 1.020 2020    LEUKOCYTESUR Negative 2020    UROBILINOGEN 0.2 2020    BILIRUBINUR Negative 2020    BLOODU MODERATE 2020    GLUCOSEU Negative 2020    AMORPHOUS MANY 2020       Radiology: Ct Chest Wo Contrast    Result Date: 3/20/2020  Patient MRN:  [id number] Patient :  3/10/1931 Patient Age:  80 years Patient Gender:  Male Order Date:3/20/2020 7:30 AM EXAM:  CT CHEST WO CONTRAST NUMBER OF IMAGES:  257 INDICATION:   cough, fever COMPARISON: None. TECHNIQUE: Multiple axial images were obtained from the apices of the lungs through the lung bases. Sagittal and coronal reconstructions performed for aid in interpretation of the study. Technique: Low-dose CT  acquisition technique included one of following options; 1 . Automated exposure control, 2. Adjustment of MA and or KV according to patient's size or 3. Use of iterative reconstruction. FINDINGS: LUNGS: There are opacities at both lower lobes. I suspect that there is likely a complement of infiltrate as well as atelectasis. This is especially so on the left. There is trace pleural effusion on the left. HEART: Unremarkable. AORTA:  Unremarkable. MEDIASTINUM:  Nonspecific subcarinal adenopathy present. Vernestine Ket UPPER ABDOMEN: Benign appearing left renal cyst present. OTHER:Unremarkable     Opacities at both lung bases. Suspect a likely combination of atelectasis and infiltrate, especially on the left. Trace left pleural effusion. See above. Ct Abdomen Pelvis W Iv Contrast Additional Contrast? None    Result Date: 3/20/2020  Patient MRN:  [id number] : 3/10/1931 Age: 80 years Gender: Male Order Date:  3/20/2020 8:00 AM EXAM: CT ABDOMEN PELVIS W IV CONTRAST number of images 333. Contrast. Isovue-370, 110 mL intravenously. Technique: Low-dose CT  acquisition technique included one of following options; 1 . Automated exposure control, 2.  Adjustment of MA and

## 2020-03-20 NOTE — CARE COORDINATION
Social Work / Discharge Planning : SW  Spoke to patient spouse for initial assessment. Patient resides with his spouse loida two [facility]. Patient uses a wheeled walker. Patient has a hx with [employer] and [address] [place] [address]. Patient currently on 02 and this is NEW. Patient does NOT have a nebulizer. If needed at discharge, they do NOT have a DME preference. Discharge goal is HOME. Await therapy input when appropriate. SW to follow.  Electronically signed by HAYLEY Gordon on 3/20/20 at 2:50 PM EDT

## 2020-03-21 ENCOUNTER — APPOINTMENT (OUTPATIENT)
Dept: GENERAL RADIOLOGY | Age: 85
DRG: 177 | End: 2020-03-21
Payer: MEDICARE

## 2020-03-21 LAB
ALBUMIN SERPL-MCNC: 2.6 G/DL (ref 3.5–5.2)
ALP BLD-CCNC: 48 U/L (ref 40–129)
ALT SERPL-CCNC: 10 U/L (ref 0–40)
ANION GAP SERPL CALCULATED.3IONS-SCNC: 13 MMOL/L (ref 7–16)
ANISOCYTOSIS: ABNORMAL
AST SERPL-CCNC: 24 U/L (ref 0–39)
BASOPHILS ABSOLUTE: 0 E9/L (ref 0–0.2)
BASOPHILS RELATIVE PERCENT: 0 % (ref 0–2)
BILIRUB SERPL-MCNC: <0.2 MG/DL (ref 0–1.2)
BUN BLDV-MCNC: 29 MG/DL (ref 8–23)
CALCIUM SERPL-MCNC: 7 MG/DL (ref 8.6–10.2)
CHLORIDE BLD-SCNC: 109 MMOL/L (ref 98–107)
CO2: 16 MMOL/L (ref 22–29)
CREAT SERPL-MCNC: 1.1 MG/DL (ref 0.7–1.2)
EOSINOPHILS ABSOLUTE: 0 E9/L (ref 0.05–0.5)
EOSINOPHILS RELATIVE PERCENT: 0 % (ref 0–6)
GFR AFRICAN AMERICAN: >60
GFR NON-AFRICAN AMERICAN: >60 ML/MIN/1.73
GLUCOSE BLD-MCNC: 81 MG/DL (ref 74–99)
HCT VFR BLD CALC: 26.8 % (ref 37–54)
HEMOGLOBIN: 7.6 G/DL (ref 12.5–16.5)
IRON SATURATION: 13 % (ref 20–55)
IRON: 16 MCG/DL (ref 59–158)
LIPASE: 274 U/L (ref 13–60)
LYMPHOCYTES ABSOLUTE: 1.05 E9/L (ref 1.5–4)
LYMPHOCYTES RELATIVE PERCENT: 14.8 % (ref 20–42)
MAGNESIUM: 1.6 MG/DL (ref 1.6–2.6)
MCH RBC QN AUTO: 26.8 PG (ref 26–35)
MCHC RBC AUTO-ENTMCNC: 28.4 % (ref 32–34.5)
MCV RBC AUTO: 94.4 FL (ref 80–99.9)
MONOCYTES ABSOLUTE: 2.59 E9/L (ref 0.1–0.95)
MONOCYTES RELATIVE PERCENT: 36.5 % (ref 2–12)
NEUTROPHILS ABSOLUTE: 3.43 E9/L (ref 1.8–7.3)
NEUTROPHILS RELATIVE PERCENT: 48.7 % (ref 43–80)
NUCLEATED RED BLOOD CELLS: 0 /100 WBC
PDW BLD-RTO: 16.9 FL (ref 11.5–15)
PHOSPHORUS: 3.1 MG/DL (ref 2.5–4.5)
PLATELET # BLD: 81 E9/L (ref 130–450)
PLATELET CONFIRMATION: NORMAL
PMV BLD AUTO: 14.5 FL (ref 7–12)
POTASSIUM REFLEX MAGNESIUM: 3.5 MMOL/L (ref 3.5–5)
RBC # BLD: 2.84 E12/L (ref 3.8–5.8)
SODIUM BLD-SCNC: 138 MMOL/L (ref 132–146)
TOTAL IRON BINDING CAPACITY: 122 MCG/DL (ref 250–450)
TOTAL PROTEIN: 5.7 G/DL (ref 6.4–8.3)
WBC # BLD: 7 E9/L (ref 4.5–11.5)

## 2020-03-21 PROCEDURE — 6360000002 HC RX W HCPCS: Performed by: EMERGENCY MEDICINE

## 2020-03-21 PROCEDURE — 6370000000 HC RX 637 (ALT 250 FOR IP): Performed by: INTERNAL MEDICINE

## 2020-03-21 PROCEDURE — 84100 ASSAY OF PHOSPHORUS: CPT

## 2020-03-21 PROCEDURE — 2700000000 HC OXYGEN THERAPY PER DAY

## 2020-03-21 PROCEDURE — 6360000002 HC RX W HCPCS: Performed by: INTERNAL MEDICINE

## 2020-03-21 PROCEDURE — 85025 COMPLETE CBC W/AUTO DIFF WBC: CPT

## 2020-03-21 PROCEDURE — 36415 COLL VENOUS BLD VENIPUNCTURE: CPT

## 2020-03-21 PROCEDURE — 71045 X-RAY EXAM CHEST 1 VIEW: CPT

## 2020-03-21 PROCEDURE — 1200000000 HC SEMI PRIVATE

## 2020-03-21 PROCEDURE — 2580000003 HC RX 258: Performed by: NURSE PRACTITIONER

## 2020-03-21 PROCEDURE — 6360000002 HC RX W HCPCS: Performed by: NURSE PRACTITIONER

## 2020-03-21 PROCEDURE — 87070 CULTURE OTHR SPECIMN AEROBIC: CPT

## 2020-03-21 PROCEDURE — 2580000003 HC RX 258: Performed by: EMERGENCY MEDICINE

## 2020-03-21 PROCEDURE — 94664 DEMO&/EVAL PT USE INHALER: CPT

## 2020-03-21 PROCEDURE — 83735 ASSAY OF MAGNESIUM: CPT

## 2020-03-21 PROCEDURE — 80053 COMPREHEN METABOLIC PANEL: CPT

## 2020-03-21 PROCEDURE — 87081 CULTURE SCREEN ONLY: CPT

## 2020-03-21 PROCEDURE — APPSS30 APP SPLIT SHARED TIME 16-30 MINUTES: Performed by: NURSE PRACTITIONER

## 2020-03-21 PROCEDURE — 87206 SMEAR FLUORESCENT/ACID STAI: CPT

## 2020-03-21 PROCEDURE — 2580000003 HC RX 258: Performed by: INTERNAL MEDICINE

## 2020-03-21 PROCEDURE — 6370000000 HC RX 637 (ALT 250 FOR IP): Performed by: NURSE PRACTITIONER

## 2020-03-21 PROCEDURE — 87040 BLOOD CULTURE FOR BACTERIA: CPT

## 2020-03-21 PROCEDURE — 87088 URINE BACTERIA CULTURE: CPT

## 2020-03-21 PROCEDURE — 83550 IRON BINDING TEST: CPT

## 2020-03-21 PROCEDURE — 87205 SMEAR GRAM STAIN: CPT

## 2020-03-21 PROCEDURE — 83690 ASSAY OF LIPASE: CPT

## 2020-03-21 PROCEDURE — 94640 AIRWAY INHALATION TREATMENT: CPT

## 2020-03-21 PROCEDURE — 6370000000 HC RX 637 (ALT 250 FOR IP): Performed by: SPECIALIST

## 2020-03-21 PROCEDURE — 99232 SBSQ HOSP IP/OBS MODERATE 35: CPT | Performed by: INTERNAL MEDICINE

## 2020-03-21 PROCEDURE — 83540 ASSAY OF IRON: CPT

## 2020-03-21 RX ORDER — IPRATROPIUM BROMIDE AND ALBUTEROL SULFATE 2.5; .5 MG/3ML; MG/3ML
1 SOLUTION RESPIRATORY (INHALATION) EVERY 4 HOURS
Status: DISCONTINUED | OUTPATIENT
Start: 2020-03-21 | End: 2020-03-31 | Stop reason: HOSPADM

## 2020-03-21 RX ORDER — IPRATROPIUM BROMIDE AND ALBUTEROL SULFATE 2.5; .5 MG/3ML; MG/3ML
1 SOLUTION RESPIRATORY (INHALATION)
Status: DISCONTINUED | OUTPATIENT
Start: 2020-03-21 | End: 2020-03-21

## 2020-03-21 RX ORDER — DOXYCYCLINE HYCLATE 100 MG/1
100 CAPSULE ORAL EVERY 12 HOURS SCHEDULED
Status: DISCONTINUED | OUTPATIENT
Start: 2020-03-21 | End: 2020-03-31 | Stop reason: HOSPADM

## 2020-03-21 RX ADMIN — ENOXAPARIN SODIUM 30 MG: 30 INJECTION SUBCUTANEOUS at 10:12

## 2020-03-21 RX ADMIN — IPRATROPIUM BROMIDE AND ALBUTEROL SULFATE 1 AMPULE: 2.5; .5 SOLUTION RESPIRATORY (INHALATION) at 12:31

## 2020-03-21 RX ADMIN — IPRATROPIUM BROMIDE AND ALBUTEROL SULFATE 1 AMPULE: 2.5; .5 SOLUTION RESPIRATORY (INHALATION) at 17:23

## 2020-03-21 RX ADMIN — ACETAMINOPHEN 650 MG: 325 TABLET ORAL at 18:52

## 2020-03-21 RX ADMIN — SODIUM CHLORIDE: 9 INJECTION, SOLUTION INTRAVENOUS at 09:59

## 2020-03-21 RX ADMIN — Medication 10 ML: at 21:00

## 2020-03-21 RX ADMIN — ACETAMINOPHEN 650 MG: 325 TABLET ORAL at 10:12

## 2020-03-21 RX ADMIN — AMPICILLIN AND SULBACTAM 3 G: 1; 2 INJECTION, POWDER, FOR SOLUTION INTRAMUSCULAR; INTRAVENOUS at 20:22

## 2020-03-21 RX ADMIN — DOXYCYCLINE HYCLATE 100 MG: 100 CAPSULE ORAL at 20:22

## 2020-03-21 RX ADMIN — DOXAZOSIN 2 MG: 2 TABLET ORAL at 10:13

## 2020-03-21 RX ADMIN — IPRATROPIUM BROMIDE AND ALBUTEROL SULFATE 1 AMPULE: 2.5; .5 SOLUTION RESPIRATORY (INHALATION) at 21:19

## 2020-03-21 RX ADMIN — AMPICILLIN AND SULBACTAM 3 G: 1; 2 INJECTION, POWDER, FOR SOLUTION INTRAMUSCULAR; INTRAVENOUS at 14:23

## 2020-03-21 RX ADMIN — AZITHROMYCIN MONOHYDRATE 500 MG: 500 INJECTION, POWDER, LYOPHILIZED, FOR SOLUTION INTRAVENOUS at 09:58

## 2020-03-21 RX ADMIN — LATANOPROST 1 DROP: 50 SOLUTION OPHTHALMIC at 20:22

## 2020-03-21 ASSESSMENT — PAIN SCALES - GENERAL
PAINLEVEL_OUTOF10: 0

## 2020-03-21 NOTE — CONSULTS
[address] [place] [address] [facility]  Consultation Note     Admit Date: 3/20/2020  6:24 AM    Reason for Consult:   Aspiration pneumonia    Attending Physician:  Jacqueline Morales MD    HISTORY OF PRESENT ILLNESS:             The history is obtained from extensive review of available past medical records. The patient is a 80 y.o. male who presents to the ED on 3/20/2020 complaining of fever and cough. He had a normal white count. Respiratory panel and rapid influenza were negative. Urinary antigens were negative. He was treated with Azithromycin and Ceftriaxone and admitted to the hospital.  Seen in consultation by pulmonary who in turn asked an ID consultation and change antibiotics to Unasyn. Past Medical History:        Diagnosis Date    Hypertension      Past Surgical History:        Procedure Laterality Date    BACK SURGERY      CARPAL TUNNEL RELEASE Bilateral     EYE SURGERY      HERNIA REPAIR      JOINT REPLACEMENT Bilateral     hips     Current Medications:   Scheduled Meds:   ipratropium-albuterol  1 ampule Inhalation Q4H    ampicillin-sulbactam  3 g Intravenous Q6H    latanoprost  1 drop Right Eye Nightly    doxazosin  2 mg Oral Daily    sodium chloride flush  10 mL Intravenous 2 times per day    enoxaparin  30 mg Subcutaneous Daily    lisinopril  5 mg Oral Daily     Continuous Infusions:   sodium chloride 50 mL/hr at 03/21/20 0959     PRN Meds:docusate sodium, sodium chloride flush, acetaminophen **OR** acetaminophen, polyethylene glycol, promethazine **OR** ondansetron    Allergies:  Patient has no known allergies.     Social History:   Social History     Socioeconomic History    Marital status:      Spouse name: None    Number of children: None    Years of education: None    Highest education level: None   Occupational History    None   Social Needs    Financial resource strain: None    Food insecurity     Worry: None     Inability: None    Constitutional: The patient is awake, alert, and oriented. No distress. Skin: Warm and dry. No rashes were noted. HEENT: Eyes show round, and reactive pupils. No jaundice. Moist mucous membranes, no ulcerations, no thrush. Neck: Supple to movements. No lymphadenopathy. Chest: No use of accessory muscles to breathe. Symmetrical expansion. Auscultation reveals crackles over the left base posteriorly with bronchial sounds and egophony. Cardiovascular: S1 and S2 are rhythmic and regular. No murmurs appreciated. Abdomen: Positive bowel sounds to auscultation. Benign to palpation. No masses felt. No hepatosplenomegaly. Extremities: No clubbing, no cyanosis, no edema.   Lines: peripheral      CBC+dif:  Recent Labs     03/20/20  0652 03/21/20  0705   WBC 8.0 7.0   HGB 7.6* 7.6*   HCT 25.9* 26.8*   MCV 92.5 94.4   PLT 84* 81*   NEUTROABS 4.39 3.43     No results found for: CRP   No results found for: CRPHS  No results found for: SEDRATE  Lab Results   Component Value Date    ALT 10 03/21/2020    AST 24 03/21/2020    ALKPHOS 48 03/21/2020    BILITOT <0.2 03/21/2020     Lab Results   Component Value Date     03/21/2020    K 3.5 03/21/2020     03/21/2020    CO2 16 03/21/2020    BUN 29 03/21/2020    CREATININE 1.1 03/21/2020    GFRAA >60 03/21/2020    LABGLOM >60 03/21/2020    GLUCOSE 81 03/21/2020    PROT 5.7 03/21/2020    LABALBU 2.6 03/21/2020    CALCIUM 7.0 03/21/2020    BILITOT <0.2 03/21/2020    ALKPHOS 48 03/21/2020    AST 24 03/21/2020    ALT 10 03/21/2020       Lab Results   Component Value Date    PROTIME 12.5 09/20/2019    INR 1.1 09/20/2019       No results found for: TSH    Lab Results   Component Value Date    COLORU Yellow 03/20/2020    PHUR 5.5 03/20/2020    WBCUA NONE 03/20/2020    RBCUA 5-10 03/20/2020    BACTERIA FEW 03/20/2020    CLARITYU Clear 03/20/2020    SPECGRAV 1.020 03/20/2020    LEUKOCYTESUR Negative 03/20/2020    UROBILINOGEN 0.2 03/20/2020    BILIRUBINUR Negative 03/20/2020

## 2020-03-21 NOTE — PROGRESS NOTES
Patient has low grade fever 99.6. He is sweating and shaking and states \"I have never felt this way before in my life. \" House doctor called and notified. I was told to give him tylenol and wait for blood culture results to come back. I also informed the doctor about patients high BP. He will be ordering lisinopril. See MAR. Will continue to monitor patient.

## 2020-03-21 NOTE — CONSULTS
Abd: Non-tender. Non-distended. No masses. No organmegaly. Normal bowel sounds. Skin: Warm and dry. No nodule on exposed extremities. No rash on exposed extremities. Lymph: No cervical LAD. No supraclavicular LAD. Ext: No joint deformity. No clubbing. No cyanosis. No edema  Neuro: Awake. Follows commands. Positive pupils/gag/corneals. Normal pain response. Lab Results:  CBC:   Recent Labs     03/20/20  0652 03/21/20  0705   WBC 8.0 7.0   HGB 7.6* 7.6*   HCT 25.9* 26.8*   MCV 92.5 94.4   PLT 84* 81*       BMP:  Recent Labs     03/20/20  0652 03/21/20  0335    138   K 4.1 3.5   CL 99 109*   CO2 18* 16*   PHOS  --  3.1   BUN 38* 29*   CREATININE 1.5* 1.1    ALB:3,BILIDIR:3,BILITOT:3,ALKPHOS:3)@    PT/INR: No results for input(s): PROTIME, INR in the last 72 hours. Cultures:  Sputum: not available  Blood: not available    ABG:   No results for input(s): PH, PO2, PCO2, HCO3, BE, O2SAT, METHB, O2HB, COHB, O2CON, HHB, THB in the last 72 hours. Films:     CT CHEST WO CONTRAST   Final Result   Opacities at both lung bases. Suspect a likely combination of   atelectasis and infiltrate, especially on the left. Trace left pleural   effusion. See above. CT ABDOMEN PELVIS W IV CONTRAST Additional Contrast? None   Final Result   Patchy bibasilar infiltrates and small pleural effusion which may be   due to pneumonia or edema. Hepatomegaly with  possible liver disease. Consider hepatobiliary scan   for evaluation of gallbladder. XR CHEST PORTABLE   Final Result   Developing perihilar airspace disease as noted. This may represent   edema or inflammatory infiltrate. XR CHEST PORTABLE    (Results Pending)   . Assessment:  1. Right basilar infiltrates, left greater than right with an elevated procalcitonin. Aspiration is strongly suspected.   Covid 19 is thought to be less likely given the patient's lack of an exposure history and the level of procalcitonin. Plan:  1. Changed to Unasyn  2. Pulmonary toilet, swallowing evaluation  3. ID consultation per primary service      Thanks for letting us see this patient in consultation. Please contact us with any questions. Office [phone] or after hours through [email or web address], x [phone]. Please note that voice recognition technology was used in the preparation of this note and make therefore it may contain inadvertent transcription errors    Monalisa Bey M.D., F.C.C.P.     [facility], [address] [facility], [address] [place] [address]

## 2020-03-21 NOTE — PROGRESS NOTES
[facility] Hospitalist   Progress Note    Admitting Date and Time: 3/20/2020  6:24 AM  Admit Dx: Pneumonia [J18.9]  Pneumonia [J18.9]    Subjective:    Patient was admitted with Pneumonia [J18.9]  Pneumonia [J18.9]. Patient seen after blood cultures were drawn. Patient oxygen saturation dropped while on 2L NC and required )2 5L NC to be applied. Patient continues to be febrile. No complaints of discomfort. Patient complains of fatigue and weakness. ROS: denies fever, chills, cp, sob, n/v, HA unless stated above.      azithromycin  500 mg Intravenous Q24H    latanoprost  1 drop Right Eye Nightly    doxazosin  2 mg Oral Daily    sodium chloride flush  10 mL Intravenous 2 times per day    enoxaparin  30 mg Subcutaneous Daily    cefTRIAXone (ROCEPHIN) IV  1 g Intravenous Q24H    lisinopril  5 mg Oral Daily     docusate sodium, 100 mg, BID PRN  sodium chloride flush, 10 mL, PRN  acetaminophen, 650 mg, Q6H PRN    Or  acetaminophen, 650 mg, Q6H PRN  polyethylene glycol, 17 g, Daily PRN  promethazine, 12.5 mg, Q6H PRN    Or  ondansetron, 4 mg, Q6H PRN  ipratropium-albuterol, 1 ampule, Q4H PRN         Objective:    BP (!) 158/78   Pulse 91   Temp 103 °F (39.4 °C) (Axillary)   Resp 20   Ht 5' 4\" (1.626 m)   Wt 139 lb (63 kg)   SpO2 92%   BMI 23.86 kg/m²   General Appearance: in no acute distress, alert, frail-appearing and cachectic  Skin: warm and dry  Head: normocephalic and atraumatic  Eyes: pupils equal, round, and reactive to light and conjunctivae normal  ENT: hearing grossly normal bilaterally  Neck: neck supple and non tender without mass   Pulmonary/Chest: wheezing, rales present- moist cough and decreased breath sounds noted throughout  Cardiovascular: normal rate, regular rhythm and intact distal pulses  Abdomen: soft, non-tender, non-distended, normal bowel sounds, no masses or organomegaly  Extremities: no cyanosis, no clubbing and no edema  Musculoskeletal: no swollen joints, no joint deformity and no tender joints  Neurologic: no cranial nerve deficit and speech normal      Recent Labs     03/20/20  0652 03/21/20  0335    138   K 4.1 3.5   CL 99 109*   CO2 18* 16*   BUN 38* 29*   CREATININE 1.5* 1.1   GLUCOSE 117* 81   CALCIUM 8.6 7.0*       Recent Labs     03/20/20  0652 03/21/20  0705   WBC 8.0 7.0   RBC 2.80* 2.84*   HGB 7.6* 7.6*   HCT 25.9* 26.8*   MCV 92.5 94.4   MCH 27.1 26.8   MCHC 29.3* 28.4*   RDW 16.8* 16.9*   PLT 84* 81*   MPV 13.6* 14.5*         Radiology:   CT CHEST WO CONTRAST   Final Result   Opacities at both lung bases. Suspect a likely combination of   atelectasis and infiltrate, especially on the left. Trace left pleural   effusion. See above. CT ABDOMEN PELVIS W IV CONTRAST Additional Contrast? None   Final Result   Patchy bibasilar infiltrates and small pleural effusion which may be   due to pneumonia or edema. Hepatomegaly with  possible liver disease. Consider hepatobiliary scan   for evaluation of gallbladder. XR CHEST PORTABLE   Final Result   Developing perihilar airspace disease as noted. This may represent   edema or inflammatory infiltrate. XR CHEST PORTABLE    (Results Pending)       Assessment:    Principal Problem:    Pneumonia due to organism  Active Problems:    Anemia    HTN (hypertension)    Acute kidney injury ([facility] [address]    Acute hyperglycemia    Community acquired pneumonia    Elevated troponin  Resolved Problems:    * No resolved hospital problems. *      Plan:    1. Pneumonia - possible aspiration d/t difficulty swallowing - may need barium swallow eval - IV Rocephin and Doxycycline - cough medication  - IVF's - contact and droplet isolation - respiratory panel negative - fever (Tylenol) continued - O2 sat dropped on 2L NC to 80% - increased to 5L NC - made treatments every 4 hrs - blood culture pending - pulmonary and ID both consulted - continue monitor closely    2.  Acute Kidney Injury -

## 2020-03-22 LAB
ABO/RH: NORMAL
ABO/RH: NORMAL
ANION GAP SERPL CALCULATED.3IONS-SCNC: 14 MMOL/L (ref 7–16)
ANTIBODY IDENTIFICATION: NORMAL
ANTIBODY SCREEN: NORMAL
BUN BLDV-MCNC: 26 MG/DL (ref 8–23)
CALCIUM SERPL-MCNC: 8.4 MG/DL (ref 8.6–10.2)
CHLORIDE BLD-SCNC: 105 MMOL/L (ref 98–107)
CO2: 18 MMOL/L (ref 22–29)
CREAT SERPL-MCNC: 1.1 MG/DL (ref 0.7–1.2)
DAT POLYSPECIFIC: NORMAL
DR. NOTIFY: NORMAL
GFR AFRICAN AMERICAN: >60
GFR NON-AFRICAN AMERICAN: >60 ML/MIN/1.73
GLUCOSE BLD-MCNC: 97 MG/DL (ref 74–99)
GRAM STAIN ORDERABLE: NORMAL
HCT VFR BLD CALC: 24.7 % (ref 37–54)
HEMOGLOBIN: 7.2 G/DL (ref 12.5–16.5)
LIPASE: 276 U/L (ref 13–60)
MCH RBC QN AUTO: 27.5 PG (ref 26–35)
MCHC RBC AUTO-ENTMCNC: 29.1 % (ref 32–34.5)
MCV RBC AUTO: 94.3 FL (ref 80–99.9)
PDW BLD-RTO: 17.2 FL (ref 11.5–15)
PLATELET # BLD: 70 E9/L (ref 130–450)
PLATELET CONFIRMATION: NORMAL
PMV BLD AUTO: ABNORMAL FL (ref 7–12)
POTASSIUM SERPL-SCNC: 3.8 MMOL/L (ref 3.5–5)
RBC # BLD: 2.62 E12/L (ref 3.8–5.8)
SODIUM BLD-SCNC: 137 MMOL/L (ref 132–146)
WBC # BLD: 6 E9/L (ref 4.5–11.5)

## 2020-03-22 PROCEDURE — 86900 BLOOD TYPING SEROLOGIC ABO: CPT

## 2020-03-22 PROCEDURE — 97161 PT EVAL LOW COMPLEX 20 MIN: CPT

## 2020-03-22 PROCEDURE — 2700000000 HC OXYGEN THERAPY PER DAY

## 2020-03-22 PROCEDURE — 94640 AIRWAY INHALATION TREATMENT: CPT

## 2020-03-22 PROCEDURE — 83690 ASSAY OF LIPASE: CPT

## 2020-03-22 PROCEDURE — P9040 RBC LEUKOREDUCED IRRADIATED: HCPCS

## 2020-03-22 PROCEDURE — 6370000000 HC RX 637 (ALT 250 FOR IP): Performed by: SPECIALIST

## 2020-03-22 PROCEDURE — 6370000000 HC RX 637 (ALT 250 FOR IP): Performed by: NURSE PRACTITIONER

## 2020-03-22 PROCEDURE — 86901 BLOOD TYPING SEROLOGIC RH(D): CPT

## 2020-03-22 PROCEDURE — 86850 RBC ANTIBODY SCREEN: CPT

## 2020-03-22 PROCEDURE — 6360000002 HC RX W HCPCS: Performed by: NURSE PRACTITIONER

## 2020-03-22 PROCEDURE — 6370000000 HC RX 637 (ALT 250 FOR IP): Performed by: INTERNAL MEDICINE

## 2020-03-22 PROCEDURE — 36415 COLL VENOUS BLD VENIPUNCTURE: CPT

## 2020-03-22 PROCEDURE — 1200000000 HC SEMI PRIVATE

## 2020-03-22 PROCEDURE — 86922 COMPATIBILITY TEST ANTIGLOB: CPT

## 2020-03-22 PROCEDURE — 2580000003 HC RX 258: Performed by: INTERNAL MEDICINE

## 2020-03-22 PROCEDURE — 36430 TRANSFUSION BLD/BLD COMPNT: CPT

## 2020-03-22 PROCEDURE — 80048 BASIC METABOLIC PNL TOTAL CA: CPT

## 2020-03-22 PROCEDURE — 86870 RBC ANTIBODY IDENTIFICATION: CPT

## 2020-03-22 PROCEDURE — APPSS30 APP SPLIT SHARED TIME 16-30 MINUTES: Performed by: NURSE PRACTITIONER

## 2020-03-22 PROCEDURE — 6360000002 HC RX W HCPCS: Performed by: INTERNAL MEDICINE

## 2020-03-22 PROCEDURE — 85027 COMPLETE CBC AUTOMATED: CPT

## 2020-03-22 PROCEDURE — 86880 COOMBS TEST DIRECT: CPT

## 2020-03-22 PROCEDURE — 99232 SBSQ HOSP IP/OBS MODERATE 35: CPT | Performed by: INTERNAL MEDICINE

## 2020-03-22 PROCEDURE — 2580000003 HC RX 258: Performed by: NURSE PRACTITIONER

## 2020-03-22 PROCEDURE — 97116 GAIT TRAINING THERAPY: CPT

## 2020-03-22 PROCEDURE — P9016 RBC LEUKOCYTES REDUCED: HCPCS

## 2020-03-22 RX ORDER — 0.9 % SODIUM CHLORIDE 0.9 %
250 INTRAVENOUS SOLUTION INTRAVENOUS ONCE
Status: COMPLETED | OUTPATIENT
Start: 2020-03-22 | End: 2020-03-23

## 2020-03-22 RX ADMIN — LISINOPRIL 5 MG: 5 TABLET ORAL at 14:49

## 2020-03-22 RX ADMIN — IPRATROPIUM BROMIDE AND ALBUTEROL SULFATE 1 AMPULE: 2.5; .5 SOLUTION RESPIRATORY (INHALATION) at 08:25

## 2020-03-22 RX ADMIN — IPRATROPIUM BROMIDE AND ALBUTEROL SULFATE 1 AMPULE: 2.5; .5 SOLUTION RESPIRATORY (INHALATION) at 00:04

## 2020-03-22 RX ADMIN — POLYETHYLENE GLYCOL 3350 17 G: 17 POWDER, FOR SOLUTION ORAL at 09:18

## 2020-03-22 RX ADMIN — AMPICILLIN AND SULBACTAM 3 G: 1; 2 INJECTION, POWDER, FOR SOLUTION INTRAMUSCULAR; INTRAVENOUS at 20:47

## 2020-03-22 RX ADMIN — DOXYCYCLINE HYCLATE 100 MG: 100 CAPSULE ORAL at 09:18

## 2020-03-22 RX ADMIN — AMPICILLIN AND SULBACTAM 3 G: 1; 2 INJECTION, POWDER, FOR SOLUTION INTRAMUSCULAR; INTRAVENOUS at 14:49

## 2020-03-22 RX ADMIN — AMPICILLIN AND SULBACTAM 3 G: 1; 2 INJECTION, POWDER, FOR SOLUTION INTRAMUSCULAR; INTRAVENOUS at 02:30

## 2020-03-22 RX ADMIN — LATANOPROST 1 DROP: 50 SOLUTION OPHTHALMIC at 20:47

## 2020-03-22 RX ADMIN — IPRATROPIUM BROMIDE AND ALBUTEROL SULFATE 1 AMPULE: 2.5; .5 SOLUTION RESPIRATORY (INHALATION) at 16:14

## 2020-03-22 RX ADMIN — Medication 10 ML: at 11:49

## 2020-03-22 RX ADMIN — IPRATROPIUM BROMIDE AND ALBUTEROL SULFATE 1 AMPULE: 2.5; .5 SOLUTION RESPIRATORY (INHALATION) at 20:52

## 2020-03-22 RX ADMIN — ACETAMINOPHEN 650 MG: 325 TABLET ORAL at 17:00

## 2020-03-22 RX ADMIN — ENOXAPARIN SODIUM 30 MG: 30 INJECTION SUBCUTANEOUS at 09:18

## 2020-03-22 RX ADMIN — DOXAZOSIN 2 MG: 2 TABLET ORAL at 09:18

## 2020-03-22 RX ADMIN — AMPICILLIN AND SULBACTAM 3 G: 1; 2 INJECTION, POWDER, FOR SOLUTION INTRAMUSCULAR; INTRAVENOUS at 09:28

## 2020-03-22 RX ADMIN — Medication 10 ML: at 09:23

## 2020-03-22 RX ADMIN — DOCUSATE SODIUM 100 MG: 100 CAPSULE, LIQUID FILLED ORAL at 09:18

## 2020-03-22 RX ADMIN — DOXYCYCLINE HYCLATE 100 MG: 100 CAPSULE ORAL at 21:25

## 2020-03-22 RX ADMIN — SODIUM CHLORIDE 250 ML: 9 INJECTION, SOLUTION INTRAVENOUS at 20:48

## 2020-03-22 RX ADMIN — Medication 10 ML: at 20:47

## 2020-03-22 ASSESSMENT — PAIN SCALES - GENERAL
PAINLEVEL_OUTOF10: 0

## 2020-03-22 NOTE — PROGRESS NOTES
and no tender joints  Neurologic: no cranial nerve deficit and speech normal      Recent Labs     03/20/20  0652 03/21/20  0335 03/22/20  0835    138 137   K 4.1 3.5 3.8   CL 99 109* 105   CO2 18* 16* 18*   BUN 38* 29* 26*   CREATININE 1.5* 1.1 1.1   GLUCOSE 117* 81 97   CALCIUM 8.6 7.0* 8.4*       Recent Labs     03/20/20  0652 03/21/20  0705 03/22/20  0835   WBC 8.0 7.0 6.0   RBC 2.80* 2.84* 2.62*   HGB 7.6* 7.6* 7.2*   HCT 25.9* 26.8* 24.7*   MCV 92.5 94.4 94.3   MCH 27.1 26.8 27.5   MCHC 29.3* 28.4* 29.1*   RDW 16.8* 16.9* 17.2*   PLT 84* 81* 70*   MPV 13.6* 14.5* NOT CALC         Radiology:   XR CHEST PORTABLE   Final Result   There is some hazy density in the infrahilar regions, improved in   comparison with the prior studies, suggesting resolving peribronchial   interstitial pneumonitis. No new or progressive pathologic findings are evident. CT CHEST WO CONTRAST   Final Result   Opacities at both lung bases. Suspect a likely combination of   atelectasis and infiltrate, especially on the left. Trace left pleural   effusion. See above. CT ABDOMEN PELVIS W IV CONTRAST Additional Contrast? None   Final Result   Patchy bibasilar infiltrates and small pleural effusion which may be   due to pneumonia or edema. Hepatomegaly with  possible liver disease. Consider hepatobiliary scan   for evaluation of gallbladder. XR CHEST PORTABLE   Final Result   Developing perihilar airspace disease as noted. This may represent   edema or inflammatory infiltrate. Assessment:    Principal Problem:    Pneumonia due to organism  Active Problems:    Anemia    HTN (hypertension)    Acute kidney injury ([address]    Acute hyperglycemia    Community acquired pneumonia    Elevated troponin    Aspiration pneumonia ([facility] [address]  Resolved Problems:    * No resolved hospital problems. *      Plan:  1.  Pneumonia - possible aspiration d/t difficulty swallowing - may need barium swallow eval - IV Rocephin and Doxycycline - cough medication  - IVF's - contact and droplet isolation - respiratory panel negative - fever (Tylenol) continued - O2 sat dropped on 2L NC to 80% - increased to 5L NC - made treatments every 4 hrs - blood culture pending - pulmonary and ID both consulted - continue monitor closely    2. Acute Kidney Injury - BUN/creatinine keshawn normal limits   3. Acute Hyperglycemia - clinically resolved   4. Hypertension - BP starting to increase - may have restart lisinopril if no improvement in blood pressure   5. Anemia - H&H low will need transfused   6.  Troponin Elevated - cardiac enzymes have been negative       Electronically signed by DARRYL Clark CNP on 3/22/2020 at 11:07 AM

## 2020-03-22 NOTE — PROGRESS NOTES
scan   for evaluation of gallbladder. XR CHEST PORTABLE   Final Result   Developing perihilar airspace disease as noted. This may represent   edema or inflammatory infiltrate. FL MODIFIED BARIUM SWALLOW W VIDEO    (Results Pending)     Assessment:  1. Right basilar infiltrates, left greater than right with an elevated procalcitonin. Aspiration is strongly suspected. Covid 19 is thought to be less likely given the patient's lack of an exposure history and the level of procalcitonin.        Plan:  1. Changed to Unasyn, doxycycline added. 2. Pulmonary toilet, swallowing evaluation         Care reviewed with the staff and the patient's family as available. Please note that voice recognition technology was used in the preparation of this note and make therefore it may contain inadvertent transcription errors. Monalisa Bey M.D., F.C.C.P.     [facility], [address] [place] [address], [address] [place] [address]

## 2020-03-23 ENCOUNTER — APPOINTMENT (OUTPATIENT)
Dept: GENERAL RADIOLOGY | Age: 85
DRG: 177 | End: 2020-03-23
Payer: MEDICARE

## 2020-03-23 LAB
ANION GAP SERPL CALCULATED.3IONS-SCNC: 14 MMOL/L (ref 7–16)
BUN BLDV-MCNC: 22 MG/DL (ref 8–23)
CALCIUM SERPL-MCNC: 8.7 MG/DL (ref 8.6–10.2)
CHLORIDE BLD-SCNC: 104 MMOL/L (ref 98–107)
CO2: 19 MMOL/L (ref 22–29)
CREAT SERPL-MCNC: 1 MG/DL (ref 0.7–1.2)
CULTURE, RESPIRATORY: NORMAL
GFR AFRICAN AMERICAN: >60
GFR NON-AFRICAN AMERICAN: >60 ML/MIN/1.73
GLUCOSE BLD-MCNC: 113 MG/DL (ref 74–99)
HCT VFR BLD CALC: 28 % (ref 37–54)
HEMOGLOBIN: 7.8 G/DL (ref 12.5–16.5)
LIPASE: 518 U/L (ref 13–60)
MCH RBC QN AUTO: 26.8 PG (ref 26–35)
MCHC RBC AUTO-ENTMCNC: 27.9 % (ref 32–34.5)
MCV RBC AUTO: 96.2 FL (ref 80–99.9)
MRSA CULTURE ONLY: NORMAL
PDW BLD-RTO: 19.1 FL (ref 11.5–15)
PLATELET # BLD: 64 E9/L (ref 130–450)
PLATELET CONFIRMATION: NORMAL
PMV BLD AUTO: ABNORMAL FL (ref 7–12)
POTASSIUM SERPL-SCNC: 3.6 MMOL/L (ref 3.5–5)
RBC # BLD: 2.91 E12/L (ref 3.8–5.8)
SMEAR, RESPIRATORY: NORMAL
SODIUM BLD-SCNC: 137 MMOL/L (ref 132–146)
URINE CULTURE, ROUTINE: NORMAL
WBC # BLD: 5.8 E9/L (ref 4.5–11.5)

## 2020-03-23 PROCEDURE — 97165 OT EVAL LOW COMPLEX 30 MIN: CPT

## 2020-03-23 PROCEDURE — 6360000002 HC RX W HCPCS: Performed by: INTERNAL MEDICINE

## 2020-03-23 PROCEDURE — 85027 COMPLETE CBC AUTOMATED: CPT

## 2020-03-23 PROCEDURE — 92611 MOTION FLUOROSCOPY/SWALLOW: CPT | Performed by: SPEECH-LANGUAGE PATHOLOGIST

## 2020-03-23 PROCEDURE — 6370000000 HC RX 637 (ALT 250 FOR IP): Performed by: NURSE PRACTITIONER

## 2020-03-23 PROCEDURE — 2580000003 HC RX 258: Performed by: INTERNAL MEDICINE

## 2020-03-23 PROCEDURE — 97530 THERAPEUTIC ACTIVITIES: CPT

## 2020-03-23 PROCEDURE — 6370000000 HC RX 637 (ALT 250 FOR IP): Performed by: INTERNAL MEDICINE

## 2020-03-23 PROCEDURE — 36415 COLL VENOUS BLD VENIPUNCTURE: CPT

## 2020-03-23 PROCEDURE — 2700000000 HC OXYGEN THERAPY PER DAY

## 2020-03-23 PROCEDURE — 6370000000 HC RX 637 (ALT 250 FOR IP): Performed by: SPECIALIST

## 2020-03-23 PROCEDURE — 97535 SELF CARE MNGMENT TRAINING: CPT

## 2020-03-23 PROCEDURE — 83690 ASSAY OF LIPASE: CPT

## 2020-03-23 PROCEDURE — 80048 BASIC METABOLIC PNL TOTAL CA: CPT

## 2020-03-23 PROCEDURE — 1200000000 HC SEMI PRIVATE

## 2020-03-23 PROCEDURE — 99232 SBSQ HOSP IP/OBS MODERATE 35: CPT | Performed by: INTERNAL MEDICINE

## 2020-03-23 PROCEDURE — APPSS30 APP SPLIT SHARED TIME 16-30 MINUTES: Performed by: NURSE PRACTITIONER

## 2020-03-23 PROCEDURE — 2500000003 HC RX 250 WO HCPCS: Performed by: RADIOLOGY

## 2020-03-23 PROCEDURE — 94640 AIRWAY INHALATION TREATMENT: CPT

## 2020-03-23 PROCEDURE — 92526 ORAL FUNCTION THERAPY: CPT | Performed by: SPEECH-LANGUAGE PATHOLOGIST

## 2020-03-23 PROCEDURE — 74230 X-RAY XM SWLNG FUNCJ C+: CPT

## 2020-03-23 PROCEDURE — 2580000003 HC RX 258: Performed by: NURSE PRACTITIONER

## 2020-03-23 PROCEDURE — 97110 THERAPEUTIC EXERCISES: CPT

## 2020-03-23 RX ORDER — LISINOPRIL 10 MG/1
10 TABLET ORAL DAILY
Status: DISCONTINUED | OUTPATIENT
Start: 2020-03-24 | End: 2020-03-31 | Stop reason: HOSPADM

## 2020-03-23 RX ORDER — FERROUS SULFATE 325(65) MG
325 TABLET ORAL 2 TIMES DAILY WITH MEALS
Status: DISCONTINUED | OUTPATIENT
Start: 2020-03-23 | End: 2020-03-31 | Stop reason: HOSPADM

## 2020-03-23 RX ADMIN — IPRATROPIUM BROMIDE AND ALBUTEROL SULFATE 1 AMPULE: 2.5; .5 SOLUTION RESPIRATORY (INHALATION) at 00:08

## 2020-03-23 RX ADMIN — AMPICILLIN AND SULBACTAM 3 G: 1; 2 INJECTION, POWDER, FOR SOLUTION INTRAMUSCULAR; INTRAVENOUS at 20:44

## 2020-03-23 RX ADMIN — AMPICILLIN AND SULBACTAM 3 G: 1; 2 INJECTION, POWDER, FOR SOLUTION INTRAMUSCULAR; INTRAVENOUS at 02:56

## 2020-03-23 RX ADMIN — IPRATROPIUM BROMIDE AND ALBUTEROL SULFATE 1 AMPULE: 2.5; .5 SOLUTION RESPIRATORY (INHALATION) at 03:41

## 2020-03-23 RX ADMIN — AMPICILLIN AND SULBACTAM 3 G: 1; 2 INJECTION, POWDER, FOR SOLUTION INTRAMUSCULAR; INTRAVENOUS at 09:10

## 2020-03-23 RX ADMIN — LATANOPROST 1 DROP: 50 SOLUTION OPHTHALMIC at 20:44

## 2020-03-23 RX ADMIN — AMPICILLIN AND SULBACTAM 3 G: 1; 2 INJECTION, POWDER, FOR SOLUTION INTRAMUSCULAR; INTRAVENOUS at 14:39

## 2020-03-23 RX ADMIN — BARIUM SULFATE 120 ML: 400 SUSPENSION ORAL at 11:21

## 2020-03-23 RX ADMIN — DOXAZOSIN 2 MG: 2 TABLET ORAL at 09:10

## 2020-03-23 RX ADMIN — DOXYCYCLINE HYCLATE 100 MG: 100 CAPSULE ORAL at 09:11

## 2020-03-23 RX ADMIN — BARIUM SULFATE 70 G: 0.81 POWDER, FOR SUSPENSION ORAL at 11:20

## 2020-03-23 RX ADMIN — IPRATROPIUM BROMIDE AND ALBUTEROL SULFATE 1 AMPULE: 2.5; .5 SOLUTION RESPIRATORY (INHALATION) at 16:29

## 2020-03-23 RX ADMIN — LISINOPRIL 5 MG: 5 TABLET ORAL at 09:11

## 2020-03-23 RX ADMIN — IPRATROPIUM BROMIDE AND ALBUTEROL SULFATE 1 AMPULE: 2.5; .5 SOLUTION RESPIRATORY (INHALATION) at 13:13

## 2020-03-23 RX ADMIN — IPRATROPIUM BROMIDE AND ALBUTEROL SULFATE 1 AMPULE: 2.5; .5 SOLUTION RESPIRATORY (INHALATION) at 09:32

## 2020-03-23 RX ADMIN — IPRATROPIUM BROMIDE AND ALBUTEROL SULFATE 1 AMPULE: 2.5; .5 SOLUTION RESPIRATORY (INHALATION) at 21:10

## 2020-03-23 RX ADMIN — SODIUM CHLORIDE: 9 INJECTION, SOLUTION INTRAVENOUS at 06:45

## 2020-03-23 RX ADMIN — FERROUS SULFATE TAB 325 MG (65 MG ELEMENTAL FE) 325 MG: 325 (65 FE) TAB at 12:16

## 2020-03-23 RX ADMIN — DOXYCYCLINE HYCLATE 100 MG: 100 CAPSULE ORAL at 20:44

## 2020-03-23 RX ADMIN — BARIUM SULFATE 10 ML: 400 PASTE ORAL at 11:19

## 2020-03-23 ASSESSMENT — PAIN SCALES - GENERAL
PAINLEVEL_OUTOF10: 0

## 2020-03-23 NOTE — PROGRESS NOTES
light in reach  Pt is making good progress towards physical therapy goals as per increased functional mobility performed and exercise participation.   Continue with physical therapy    Time in: 9917  Time out: 8254 [facility] PTA [address]

## 2020-03-23 NOTE — PROGRESS NOTES
or rub. Abd: Non-tender. Non-distended. No masses. No organomegaly. Normal bowel sounds. Skin: Warm and dry. No nodule on exposed extremities. No rash on exposed extremities. Ext: No cyanosis, clubbing, edema  Lymph: No cervical LAD. No supraclavicular LAD. M/S: No cyanosis. No joint deformity. No clubbing. Neuro: Awake. Follows commands. Positive pupils/gag/corneals. Normal pain response. Results:  CBC:   Recent Labs     03/21/20  0705 03/22/20  0835 03/23/20  0500   WBC 7.0 6.0 5.8   HGB 7.6* 7.2* 7.8*   HCT 26.8* 24.7* 28.0*   MCV 94.4 94.3 96.2   PLT 81* 70* 64*     BMP:   Recent Labs     03/21/20  0335 03/22/20  0835 03/23/20  0500    137 137   K 3.5 3.8 3.6   * 105 104   CO2 16* 18* 19*   PHOS 3.1  --   --    BUN 29* 26* 22   CREATININE 1.1 1.1 1.0     LIVER PROFILE:   Recent Labs     03/21/20  0335 03/22/20  0835 03/23/20  0500   AST 24  --   --    ALT 10  --   --    LIPASE 274* 276* 518*   BILITOT <0.2  --   --    ALKPHOS 48  --   --      PT/INR: No results for input(s): PROTIME, INR in the last 72 hours. APTT: No results for input(s): APTT in the last 72 hours. Pathology:  1. N/A      Microbiology:  1. None    Recent ABG:   No results for input(s): PH, PO2, PCO2, HCO3, BE, O2SAT, METHB, O2HB, COHB, O2CON, HHB, THB in the last 72 hours. Recent Films:  XR CHEST PORTABLE   Final Result   There is some hazy density in the infrahilar regions, improved in   comparison with the prior studies, suggesting resolving peribronchial   interstitial pneumonitis. No new or progressive pathologic findings are evident. CT CHEST WO CONTRAST   Final Result   Opacities at both lung bases. Suspect a likely combination of   atelectasis and infiltrate, especially on the left. Trace left pleural   effusion. See above.                   CT ABDOMEN PELVIS W IV CONTRAST Additional Contrast? None   Final Result   Patchy bibasilar infiltrates and small pleural effusion which may be

## 2020-03-23 NOTE — PROGRESS NOTES
Continue  IV Unasyn (day 3), Doxycycline (day 3) , antitussive. IVF's. Respiratory panel negative. Blood and urine culture no growth. Tmax 101.7, now afebrile. No leukocytosis. Procalcitonin 0.59. Seen by pulmonology, ID  2. Acute Kidney Injury: currently resolved. 3. Acute Hyperglycemia:  resolved   4. Hypertension:  Elevated. Lisinopril resumed at 5mg,  Home dose is 10mg, will increase lisinopril to 10mg daily. Continue to monitor blood pressure. Adjust meds as needed. 5. Anemia: transfused 1 unit PRBC for hgb 7.2 on 03/22. Hgb 7.8. No overt signs of bleeding. Low iron studies. Start oral ferrous sulfate  6. Troponin Elevated: cycled and negative.         Electronically signed by DARRYL Christie CNP on 3/23/2020 at 10:29 AM

## 2020-03-23 NOTE — PROGRESS NOTES
Spoke to blood bank regarding confusion of how many units of blood to be prepared and hung. Blood  Bank verified only 1 unit was ordered to be prepared.

## 2020-03-23 NOTE — PROGRESS NOTES
Patient Active Problem List   Diagnosis    Pneumonia due to organism    Anemia    HTN (hypertension)    Acute kidney injury ([facility] [address]    Acute hyperglycemia    Community acquired pneumonia    Elevated troponin    Aspiration pneumonia ([facility] [address]

## 2020-03-23 NOTE — PROGRESS NOTES
goals     Patient / Family Goal: To get home. Patient and/or family were instructed on functional diagnosis, prognosis/goals and OT plan of care. Pt verbalized understanding. Upon arrival, patient supine in bed. At end of session, patient seated in chair with call light and phone within reach, all lines and tubes intact. Pt would benefit from continued skilled OT to increase safety and independence with completion of ADL/IADL tasks for functional independence and quality of life.  Bed/chair alarm: ON    Low Evaluation + 10 timed treatment minutes  Treatment Time In:1430   Treatment Time Out: 1440   Treatment Charges: Mins Units    ADL/Home Mgt 63486 6 1    Thera Activities 95491 4     Ther Ex 33203      Manual Therapy 88956      Neuro Re-ed 90719      Orthotic manage/training  20117      Non Billable Time      Total Timed Treatment 10 1        Evaluation time includes thorough review of current medical information, gathering information on past medical history/social history and prior level of function, completion of standardized testing/informal observation of tasks, assessment of data, and development of POC/Goals    Mor Spain OTR/L  [id number]

## 2020-03-23 NOTE — CARE COORDINATION
Per Sammy Sutherland; pt has been accepted to [facility] when medically ready. wife aware. Jenna Bulls.

## 2020-03-24 ENCOUNTER — APPOINTMENT (OUTPATIENT)
Dept: GENERAL RADIOLOGY | Age: 85
DRG: 177 | End: 2020-03-24
Payer: MEDICARE

## 2020-03-24 LAB
ANION GAP SERPL CALCULATED.3IONS-SCNC: 14 MMOL/L (ref 7–16)
BUN BLDV-MCNC: 17 MG/DL (ref 8–23)
CALCIUM SERPL-MCNC: 8.1 MG/DL (ref 8.6–10.2)
CHLORIDE BLD-SCNC: 107 MMOL/L (ref 98–107)
CO2: 19 MMOL/L (ref 22–29)
CREAT SERPL-MCNC: 1 MG/DL (ref 0.7–1.2)
GFR AFRICAN AMERICAN: >60
GFR NON-AFRICAN AMERICAN: >60 ML/MIN/1.73
GLUCOSE BLD-MCNC: 121 MG/DL (ref 74–99)
HCT VFR BLD CALC: 25.9 % (ref 37–54)
HEMOGLOBIN: 7.6 G/DL (ref 12.5–16.5)
LIPASE: 486 U/L (ref 13–60)
MCH RBC QN AUTO: 26.7 PG (ref 26–35)
MCHC RBC AUTO-ENTMCNC: 29.3 % (ref 32–34.5)
MCV RBC AUTO: 90.9 FL (ref 80–99.9)
PDW BLD-RTO: 18.9 FL (ref 11.5–15)
PLATELET # BLD: 59 E9/L (ref 130–450)
PLATELET CONFIRMATION: NORMAL
PMV BLD AUTO: ABNORMAL FL (ref 7–12)
POTASSIUM SERPL-SCNC: 3.7 MMOL/L (ref 3.5–5)
RBC # BLD: 2.85 E12/L (ref 3.8–5.8)
SODIUM BLD-SCNC: 140 MMOL/L (ref 132–146)
WBC # BLD: 4.1 E9/L (ref 4.5–11.5)

## 2020-03-24 PROCEDURE — 6360000002 HC RX W HCPCS: Performed by: NURSE PRACTITIONER

## 2020-03-24 PROCEDURE — 83690 ASSAY OF LIPASE: CPT

## 2020-03-24 PROCEDURE — 6360000002 HC RX W HCPCS: Performed by: INTERNAL MEDICINE

## 2020-03-24 PROCEDURE — 2580000003 HC RX 258: Performed by: SPECIALIST

## 2020-03-24 PROCEDURE — 71046 X-RAY EXAM CHEST 2 VIEWS: CPT

## 2020-03-24 PROCEDURE — 80048 BASIC METABOLIC PNL TOTAL CA: CPT

## 2020-03-24 PROCEDURE — 94640 AIRWAY INHALATION TREATMENT: CPT

## 2020-03-24 PROCEDURE — APPSS30 APP SPLIT SHARED TIME 16-30 MINUTES: Performed by: NURSE PRACTITIONER

## 2020-03-24 PROCEDURE — 2700000000 HC OXYGEN THERAPY PER DAY

## 2020-03-24 PROCEDURE — 6370000000 HC RX 637 (ALT 250 FOR IP): Performed by: SPECIALIST

## 2020-03-24 PROCEDURE — 6370000000 HC RX 637 (ALT 250 FOR IP): Performed by: NURSE PRACTITIONER

## 2020-03-24 PROCEDURE — 97530 THERAPEUTIC ACTIVITIES: CPT

## 2020-03-24 PROCEDURE — 92526 ORAL FUNCTION THERAPY: CPT | Performed by: SPEECH-LANGUAGE PATHOLOGIST

## 2020-03-24 PROCEDURE — 36415 COLL VENOUS BLD VENIPUNCTURE: CPT

## 2020-03-24 PROCEDURE — 2580000003 HC RX 258: Performed by: INTERNAL MEDICINE

## 2020-03-24 PROCEDURE — 1200000000 HC SEMI PRIVATE

## 2020-03-24 PROCEDURE — 6360000002 HC RX W HCPCS: Performed by: SPECIALIST

## 2020-03-24 PROCEDURE — 85027 COMPLETE CBC AUTOMATED: CPT

## 2020-03-24 PROCEDURE — 97110 THERAPEUTIC EXERCISES: CPT

## 2020-03-24 PROCEDURE — 6370000000 HC RX 637 (ALT 250 FOR IP): Performed by: INTERNAL MEDICINE

## 2020-03-24 PROCEDURE — 99232 SBSQ HOSP IP/OBS MODERATE 35: CPT | Performed by: INTERNAL MEDICINE

## 2020-03-24 PROCEDURE — 2580000003 HC RX 258: Performed by: NURSE PRACTITIONER

## 2020-03-24 RX ORDER — DOXAZOSIN 2 MG/1
2 TABLET ORAL DAILY
Qty: 30 TABLET | Refills: 3 | Status: CANCELLED | OUTPATIENT
Start: 2020-03-25

## 2020-03-24 RX ORDER — IPRATROPIUM BROMIDE AND ALBUTEROL SULFATE 2.5; .5 MG/3ML; MG/3ML
3 SOLUTION RESPIRATORY (INHALATION) EVERY 4 HOURS
Qty: 360 ML | Refills: 0 | Status: CANCELLED
Start: 2020-03-24

## 2020-03-24 RX ORDER — DOXYCYCLINE HYCLATE 100 MG/1
100 CAPSULE ORAL EVERY 12 HOURS SCHEDULED
Qty: 20 CAPSULE | Refills: 0 | Status: CANCELLED | OUTPATIENT
Start: 2020-03-24 | End: 2020-04-03

## 2020-03-24 RX ORDER — AMOXICILLIN AND CLAVULANATE POTASSIUM 875; 125 MG/1; MG/1
1 TABLET, FILM COATED ORAL EVERY 12 HOURS SCHEDULED
Status: DISCONTINUED | OUTPATIENT
Start: 2020-03-25 | End: 2020-03-31 | Stop reason: HOSPADM

## 2020-03-24 RX ORDER — PSEUDOEPHEDRINE HCL 30 MG
100 TABLET ORAL 2 TIMES DAILY PRN
Qty: 60 CAPSULE | Refills: 0 | Status: CANCELLED
Start: 2020-03-24

## 2020-03-24 RX ORDER — POLYETHYLENE GLYCOL 3350 17 G/17G
17 POWDER, FOR SOLUTION ORAL DAILY PRN
Qty: 527 G | Refills: 1 | Status: CANCELLED | OUTPATIENT
Start: 2020-03-24 | End: 2020-04-23

## 2020-03-24 RX ORDER — FERROUS SULFATE 325(65) MG
325 TABLET ORAL 2 TIMES DAILY WITH MEALS
Qty: 30 TABLET | Refills: 3 | Status: CANCELLED | OUTPATIENT
Start: 2020-03-24

## 2020-03-24 RX ADMIN — Medication 10 ML: at 09:41

## 2020-03-24 RX ADMIN — IPRATROPIUM BROMIDE AND ALBUTEROL SULFATE 1 AMPULE: 2.5; .5 SOLUTION RESPIRATORY (INHALATION) at 04:20

## 2020-03-24 RX ADMIN — LATANOPROST 1 DROP: 50 SOLUTION OPHTHALMIC at 19:55

## 2020-03-24 RX ADMIN — AMPICILLIN AND SULBACTAM 3 G: 1; 2 INJECTION, POWDER, FOR SOLUTION INTRAMUSCULAR; INTRAVENOUS at 09:41

## 2020-03-24 RX ADMIN — DOXYCYCLINE HYCLATE 100 MG: 100 CAPSULE ORAL at 20:00

## 2020-03-24 RX ADMIN — DOXAZOSIN 2 MG: 2 TABLET ORAL at 09:41

## 2020-03-24 RX ADMIN — FERROUS SULFATE TAB 325 MG (65 MG ELEMENTAL FE) 325 MG: 325 (65 FE) TAB at 17:56

## 2020-03-24 RX ADMIN — AMPICILLIN AND SULBACTAM 3 G: 1; 2 INJECTION, POWDER, FOR SOLUTION INTRAMUSCULAR; INTRAVENOUS at 14:21

## 2020-03-24 RX ADMIN — IPRATROPIUM BROMIDE AND ALBUTEROL SULFATE 1 AMPULE: 2.5; .5 SOLUTION RESPIRATORY (INHALATION) at 13:30

## 2020-03-24 RX ADMIN — AMPICILLIN AND SULBACTAM 3 G: 1; 2 INJECTION, POWDER, FOR SOLUTION INTRAMUSCULAR; INTRAVENOUS at 19:55

## 2020-03-24 RX ADMIN — ACETAMINOPHEN 650 MG: 325 TABLET ORAL at 05:14

## 2020-03-24 RX ADMIN — ACETAMINOPHEN 650 MG: 325 TABLET ORAL at 12:13

## 2020-03-24 RX ADMIN — FERROUS SULFATE TAB 325 MG (65 MG ELEMENTAL FE) 325 MG: 325 (65 FE) TAB at 09:40

## 2020-03-24 RX ADMIN — ACETAMINOPHEN 650 MG: 325 TABLET ORAL at 18:11

## 2020-03-24 RX ADMIN — LISINOPRIL 10 MG: 10 TABLET ORAL at 09:41

## 2020-03-24 RX ADMIN — IPRATROPIUM BROMIDE AND ALBUTEROL SULFATE 1 AMPULE: 2.5; .5 SOLUTION RESPIRATORY (INHALATION) at 20:03

## 2020-03-24 RX ADMIN — IPRATROPIUM BROMIDE AND ALBUTEROL SULFATE 1 AMPULE: 2.5; .5 SOLUTION RESPIRATORY (INHALATION) at 23:37

## 2020-03-24 RX ADMIN — Medication 10 ML: at 19:55

## 2020-03-24 RX ADMIN — AMPICILLIN AND SULBACTAM 3 G: 1; 2 INJECTION, POWDER, FOR SOLUTION INTRAMUSCULAR; INTRAVENOUS at 02:19

## 2020-03-24 RX ADMIN — IPRATROPIUM BROMIDE AND ALBUTEROL SULFATE 1 AMPULE: 2.5; .5 SOLUTION RESPIRATORY (INHALATION) at 01:06

## 2020-03-24 RX ADMIN — ENOXAPARIN SODIUM 30 MG: 30 INJECTION SUBCUTANEOUS at 09:41

## 2020-03-24 RX ADMIN — DOXYCYCLINE HYCLATE 100 MG: 100 CAPSULE ORAL at 09:40

## 2020-03-24 RX ADMIN — IPRATROPIUM BROMIDE AND ALBUTEROL SULFATE 1 AMPULE: 2.5; .5 SOLUTION RESPIRATORY (INHALATION) at 16:48

## 2020-03-24 ASSESSMENT — PAIN SCALES - GENERAL
PAINLEVEL_OUTOF10: 10
PAINLEVEL_OUTOF10: 6
PAINLEVEL_OUTOF10: 8
PAINLEVEL_OUTOF10: 5

## 2020-03-24 ASSESSMENT — PAIN DESCRIPTION - ORIENTATION: ORIENTATION: RIGHT

## 2020-03-24 ASSESSMENT — PAIN DESCRIPTION - PROGRESSION: CLINICAL_PROGRESSION: NOT CHANGED

## 2020-03-24 ASSESSMENT — PAIN DESCRIPTION - PAIN TYPE: TYPE: ACUTE PAIN

## 2020-03-24 ASSESSMENT — PAIN DESCRIPTION - LOCATION: LOCATION: HIP

## 2020-03-24 ASSESSMENT — PAIN DESCRIPTION - DESCRIPTORS: DESCRIPTORS: ACHING;DISCOMFORT;SORE

## 2020-03-24 ASSESSMENT — PAIN DESCRIPTION - FREQUENCY: FREQUENCY: CONTINUOUS

## 2020-03-24 ASSESSMENT — PAIN DESCRIPTION - ONSET: ONSET: ON-GOING

## 2020-03-24 NOTE — PROGRESS NOTES
Spoke to wife, stated returning social work call. Stated her  wants to return home and does not want rehab. Patient might be agreeable to home health care.  Electronically signed by Teresa Hernandez RN on 3/24/2020 at 4:00 PM

## 2020-03-24 NOTE — PROGRESS NOTES
[address] [place] [address] [facility]  Progress Note    SUBJECTIVE:  Chief Complaint   Patient presents with    Fever    Cough     onset saturday     Patient is tolerating medications. No reported adverse drug reactions. No nausea, vomiting, diarrhea. Temp max 101.2. feeling better. +cough, less sputum. No pain. Denies sob on O2 via n/c. + dry mouth/thirst    Review of systems:  As stated above in the chief complaint, otherwise negative. Medications:  Scheduled Meds:   lisinopril  10 mg Oral Daily    ferrous sulfate  325 mg Oral BID WC    ipratropium-albuterol  1 ampule Inhalation Q4H    ampicillin-sulbactam  3 g Intravenous Q6H    doxycycline hyclate  100 mg Oral 2 times per day    latanoprost  1 drop Right Eye Nightly    doxazosin  2 mg Oral Daily    sodium chloride flush  10 mL Intravenous 2 times per day    enoxaparin  30 mg Subcutaneous Daily     Continuous Infusions:   sodium chloride 50 mL/hr at 20 0645     PRN Meds:docusate sodium, sodium chloride flush, acetaminophen **OR** acetaminophen, polyethylene glycol, promethazine **OR** ondansetron    OBJECTIVE:  BP (!) 168/86   Pulse 61   Temp 98 °F (36.7 °C) (Oral)   Resp 16   Ht 5' 4\" (1.626 m)   Wt 138 lb (62.6 kg)   SpO2 95%   BMI 23.69 kg/m²   Temp  Av °F (36.7 °C)  Min: 97.9 °F (36.6 °C)  Max: 98 °F (36.7 °C)  Constitutional: The patient is awake, alert, and oriented. Lying in bed in NAD  Skin: Warm and dry. No rashes were noted. HEENT: Round and reactive pupils. Moist mucous membranes. No ulcerations or thrush. Neck: Supple to movements. Chest: No use of accessory muscles to breathe. Symmetrical expansion. Crackles right base  Cardiovascular: S1 and S2 are rhythmic and regular. No murmurs appreciated. Abdomen: Positive bowel sounds to auscultation. Soft, nontender, nondistended  Extremities: No clubbing, no cyanosis, no edema.   Lines: peripheral    Laboratory and Tests Review:  Lab Results Component Value Date    WBC 4.1 (L) 03/24/2020    WBC 5.8 03/23/2020    WBC 6.0 03/22/2020    HGB 7.6 (L) 03/24/2020    HCT 25.9 (L) 03/24/2020    MCV 90.9 03/24/2020    PLT 59 (L) 03/24/2020     Lab Results   Component Value Date    NEUTROABS 3.43 03/21/2020    NEUTROABS 4.39 03/20/2020    NEUTROABS 1.39 (L) 09/20/2019     No results found for: [facility]  Lab Results   Component Value Date    ALT 10 03/21/2020    AST 24 03/21/2020    ALKPHOS 48 03/21/2020    BILITOT <0.2 03/21/2020     Lab Results   Component Value Date     03/24/2020    K 3.7 03/24/2020    K 3.5 03/21/2020     03/24/2020    CO2 19 03/24/2020    BUN 17 03/24/2020    CREATININE 1.0 03/24/2020    CREATININE 1.0 03/23/2020    CREATININE 1.1 03/22/2020    GFRAA >60 03/24/2020    LABGLOM >60 03/24/2020    GLUCOSE 121 03/24/2020    PROT 5.7 03/21/2020    LABALBU 2.6 03/21/2020    CALCIUM 8.1 03/24/2020    BILITOT <0.2 03/21/2020    ALKPHOS 48 03/21/2020    AST 24 03/21/2020    ALT 10 03/21/2020     No results found for: CRP  No results found for: [address]  Radiology:  Swallow evaluation noted    Microbiology:   Respiratory panel: Negative  Streptococcus pneumoniae/Legionella urine Ag: negative  Rapid influenza: Negative  Blood cultures 3/21/2020: Negative so far  Respiratory culture 3/21/2020 OP fer present    ASSESSMENT:  · Left lower lobe community-acquired pneumonia. Improved clinically  · Possible dysphagia.   Did okay with swallow evaluation  · Fever secondary to pneumonia, resolving     Plan:    · Continue Unasyn and Doxycycline  · Change Unasyn to Augmentin tomorrow  · If the patient continues to improve he can be discharged on 7 days of antibiotics    Josh Snow  5:03 PM  3/24/2020

## 2020-03-24 NOTE — FLOWSHEET NOTE
visited patient with hopes of providing spiritual care. Patient welcomed  into the room. During the visit, patient identified his supports as being his 5 children and spouse. Patient stated that he was especially proud of his one son that is an orthodontist.  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 However, the patient stated that the last few years he and his wife had been attending [employer] at the [employer]. The patient stated that this all stopped once the  that was hosting the Tel[employer] had a stroke and . Patient stated that although he hasn't attended [employer] in a while that he has a relationship with God.  offered the patient prayer before the conclusion of visit. Patient agreed and the  prayed. The visit was ended shortly thereafter.

## 2020-03-24 NOTE — PROGRESS NOTES
No masses. No organomegaly. Normal bowel sounds. Skin: Warm and dry. No nodule on exposed extremities. No rash on exposed extremities. Ext: No cyanosis, clubbing, edema  Lymph: No cervical LAD. No supraclavicular LAD. M/S: No cyanosis. No joint deformity. No clubbing. Neuro: Awake. Follows commands. Positive pupils/gag/corneals. Normal pain response. Results:  CBC:   Recent Labs     03/22/20  0835 03/23/20  0500 03/24/20  0230   WBC 6.0 5.8 4.1*   HGB 7.2* 7.8* 7.6*   HCT 24.7* 28.0* 25.9*   MCV 94.3 96.2 90.9   PLT 70* 64* 59*     BMP:   Recent Labs     03/22/20  0835 03/23/20  0500 03/24/20  0230    137 140   K 3.8 3.6 3.7    104 107   CO2 18* 19* 19*   BUN 26* 22 17   CREATININE 1.1 1.0 1.0     LIVER PROFILE:   Recent Labs     03/22/20  0835 03/23/20  0500 03/24/20  0230   LIPASE 276* 518* 486*     PT/INR: No results for input(s): PROTIME, INR in the last 72 hours. APTT: No results for input(s): APTT in the last 72 hours. Pathology:  1. N/A      Microbiology:  1. None    Recent ABG:   No results for input(s): PH, PO2, PCO2, HCO3, BE, O2SAT, METHB, O2HB, COHB, O2CON, HHB, THB in the last 72 hours. Recent Films:  XR CHEST STANDARD (2 VW)   Final Result   Interval worsening dense airspace opacities in the lower lungs   bilaterally. Concerning for multifocal versus viral pneumonia. The exam has been dictated and signed by Noy Dhaliwal. LEON Spence   and Jake George [facility] MD, reviewed and concurred with these findings. FL MODIFIED BARIUM SWALLOW W VIDEO   Final Result   Study is remarkable for transient laryngeal penetration   with thin consistency. This procedure was performed and dictated by Dex Ortega PA-C with   indirect supervision, and Breanne Mcfarlane. [facility] MD reviewed and concurred with   the findings.             XR CHEST PORTABLE   Final Result   There is some hazy density in the infrahilar regions, improved in   comparison with the prior studies, suggesting

## 2020-03-24 NOTE — PROGRESS NOTES
[address] Sitting: CGA  Standing: Mod- Max A      Activity Tolerance Poor plus  2L O2 restin%  With activity: 90% Poor+  standing ascencion x6-7 min with fair plus balance during self care tasks         Fair use of B UE during tasks. Comments: Upon arrival pt was supine in bed. At end of session pt was transferred to chair with alarm on, all lines and tubes intact and call light within reach. Treatment: Instructed pt on techniques to improve standing balance during ADLs and transfers. Pt performed 1x3 reps of STS exercises from arm chair to improve endurance during ADLs along with proper eileen shifting during transfers. Education: Safe transfer training and techniques to improve standing balance during ADLs. · Pt has made good progress towards set goals.    · Continue with current plan of care      Treatment Charges: Mins Units   Ther Ex  02182     Manual Therapy 90046     Thera Activities 86464 10 1   ADL/Home Mgt 95155 5 0   Neuro Re-ed 12402     Group Therapy      Orthotic manage/training  67868     Non-Billable Time     Total Timed Treatment 15 1       Reina ALVAREZ/KELTON [id number]

## 2020-03-25 ENCOUNTER — APPOINTMENT (OUTPATIENT)
Dept: CT IMAGING | Age: 85
DRG: 177 | End: 2020-03-25
Payer: MEDICARE

## 2020-03-25 ENCOUNTER — APPOINTMENT (OUTPATIENT)
Dept: GENERAL RADIOLOGY | Age: 85
DRG: 177 | End: 2020-03-25
Payer: MEDICARE

## 2020-03-25 LAB
ALBUMIN SERPL-MCNC: 2.5 G/DL (ref 3.5–5.2)
ALP BLD-CCNC: 50 U/L (ref 40–129)
ALT SERPL-CCNC: 24 U/L (ref 0–40)
ANION GAP SERPL CALCULATED.3IONS-SCNC: 16 MMOL/L (ref 7–16)
ANISOCYTOSIS: ABNORMAL
AST SERPL-CCNC: 41 U/L (ref 0–39)
B.E.: -3.7 MMOL/L (ref -3–3)
BASOPHILS ABSOLUTE: 0 E9/L (ref 0–0.2)
BASOPHILS RELATIVE PERCENT: 0 % (ref 0–2)
BILIRUB SERPL-MCNC: 0.4 MG/DL (ref 0–1.2)
BLOOD BANK DISPENSE STATUS: NORMAL
BLOOD BANK PRODUCT CODE: NORMAL
BLOOD CULTURE, ROUTINE: NORMAL
BPU ID: NORMAL
BUN BLDV-MCNC: 15 MG/DL (ref 8–23)
C-REACTIVE PROTEIN: 12.3 MG/DL (ref 0–0.4)
CALCIUM SERPL-MCNC: 8 MG/DL (ref 8.6–10.2)
CHLORIDE BLD-SCNC: 103 MMOL/L (ref 98–107)
CO2: 18 MMOL/L (ref 22–29)
COHB: 1.6 % (ref 0–1.5)
CREAT SERPL-MCNC: 0.9 MG/DL (ref 0.7–1.2)
CRITICAL: ABNORMAL
CULTURE, BLOOD 2: NORMAL
D DIMER: 1532 NG/ML DDU
DATE ANALYZED: ABNORMAL
DATE OF COLLECTION: ABNORMAL
DESCRIPTION BLOOD BANK: NORMAL
EOSINOPHILS ABSOLUTE: 0 E9/L (ref 0.05–0.5)
EOSINOPHILS RELATIVE PERCENT: 0 % (ref 0–6)
GFR AFRICAN AMERICAN: >60
GFR NON-AFRICAN AMERICAN: >60 ML/MIN/1.73
GLUCOSE BLD-MCNC: 155 MG/DL (ref 74–99)
HCO3: 19.5 MMOL/L (ref 22–26)
HCT VFR BLD CALC: 19.7 % (ref 37–54)
HEMOGLOBIN: 5.8 G/DL (ref 12.5–16.5)
HHB: 1.2 % (ref 0–5)
HYPOCHROMIA: ABNORMAL
IMMATURE GRANULOCYTES #: 0.43 E9/L
IMMATURE GRANULOCYTES %: 4.5 % (ref 0–5)
LAB: ABNORMAL
LACTIC ACID: 1.6 MMOL/L (ref 0.5–2.2)
LYMPHOCYTES ABSOLUTE: 1.06 E9/L (ref 1.5–4)
LYMPHOCYTES RELATIVE PERCENT: 11.2 % (ref 20–42)
Lab: ABNORMAL
MAGNESIUM: 1.3 MG/DL (ref 1.6–2.6)
MCH RBC QN AUTO: 26.7 PG (ref 26–35)
MCHC RBC AUTO-ENTMCNC: 29.4 % (ref 32–34.5)
MCV RBC AUTO: 90.8 FL (ref 80–99.9)
METER GLUCOSE: 189 MG/DL (ref 74–99)
METHB: 0.2 % (ref 0–1.5)
MODE: ABNORMAL
MONOCYTES ABSOLUTE: 4.3 E9/L (ref 0.1–0.95)
MONOCYTES RELATIVE PERCENT: 45.4 % (ref 2–12)
NEUTROPHILS ABSOLUTE: 3.69 E9/L (ref 1.8–7.3)
NEUTROPHILS RELATIVE PERCENT: 38.9 % (ref 43–80)
O2 CONTENT: 9.3 ML/DL
O2 SATURATION: 98.8 % (ref 92–98.5)
O2HB: 97 % (ref 94–97)
OPERATOR ID: 913
OVALOCYTES: ABNORMAL
PATIENT TEMP: 37 C
PCO2: 27.4 MMHG (ref 35–45)
PDW BLD-RTO: 18.2 FL (ref 11.5–15)
PH BLOOD GAS: 7.47 (ref 7.35–7.45)
PHOSPHORUS: 4.1 MG/DL (ref 2.5–4.5)
PLATELET # BLD: 63 E9/L (ref 130–450)
PLATELET CONFIRMATION: NORMAL
PMV BLD AUTO: ABNORMAL FL (ref 7–12)
PO2: 130.2 MMHG (ref 75–100)
POIKILOCYTES: ABNORMAL
POTASSIUM SERPL-SCNC: 3.8 MMOL/L (ref 3.5–5)
RBC # BLD: 2.17 E12/L (ref 3.8–5.8)
REASON FOR REJECTION: NORMAL
REJECTED TEST: NORMAL
SODIUM BLD-SCNC: 137 MMOL/L (ref 132–146)
SOURCE, BLOOD GAS: ABNORMAL
TARGET CELLS: ABNORMAL
THB: 6.6 G/DL (ref 11.5–16.5)
TIME ANALYZED: 1654
TOTAL PROTEIN: 6.2 G/DL (ref 6.4–8.3)
WBC # BLD: 9.5 E9/L (ref 4.5–11.5)

## 2020-03-25 PROCEDURE — 80053 COMPREHEN METABOLIC PANEL: CPT

## 2020-03-25 PROCEDURE — C9113 INJ PANTOPRAZOLE SODIUM, VIA: HCPCS | Performed by: INTERNAL MEDICINE

## 2020-03-25 PROCEDURE — 71046 X-RAY EXAM CHEST 2 VIEWS: CPT

## 2020-03-25 PROCEDURE — 6370000000 HC RX 637 (ALT 250 FOR IP): Performed by: NURSE PRACTITIONER

## 2020-03-25 PROCEDURE — 6360000002 HC RX W HCPCS: Performed by: SPECIALIST

## 2020-03-25 PROCEDURE — 83735 ASSAY OF MAGNESIUM: CPT

## 2020-03-25 PROCEDURE — 86140 C-REACTIVE PROTEIN: CPT

## 2020-03-25 PROCEDURE — 2580000003 HC RX 258: Performed by: NURSE PRACTITIONER

## 2020-03-25 PROCEDURE — 6370000000 HC RX 637 (ALT 250 FOR IP): Performed by: SPECIALIST

## 2020-03-25 PROCEDURE — 2580000003 HC RX 258: Performed by: INTERNAL MEDICINE

## 2020-03-25 PROCEDURE — 70450 CT HEAD/BRAIN W/O DYE: CPT

## 2020-03-25 PROCEDURE — 93005 ELECTROCARDIOGRAM TRACING: CPT | Performed by: CLINICAL NURSE SPECIALIST

## 2020-03-25 PROCEDURE — 82805 BLOOD GASES W/O2 SATURATION: CPT

## 2020-03-25 PROCEDURE — 84100 ASSAY OF PHOSPHORUS: CPT

## 2020-03-25 PROCEDURE — 36430 TRANSFUSION BLD/BLD COMPNT: CPT

## 2020-03-25 PROCEDURE — 94640 AIRWAY INHALATION TREATMENT: CPT

## 2020-03-25 PROCEDURE — 85378 FIBRIN DEGRADE SEMIQUANT: CPT

## 2020-03-25 PROCEDURE — 99291 CRITICAL CARE FIRST HOUR: CPT | Performed by: INTERNAL MEDICINE

## 2020-03-25 PROCEDURE — 92526 ORAL FUNCTION THERAPY: CPT | Performed by: SPEECH-LANGUAGE PATHOLOGIST

## 2020-03-25 PROCEDURE — 85025 COMPLETE CBC W/AUTO DIFF WBC: CPT

## 2020-03-25 PROCEDURE — 36415 COLL VENOUS BLD VENIPUNCTURE: CPT

## 2020-03-25 PROCEDURE — 83605 ASSAY OF LACTIC ACID: CPT

## 2020-03-25 PROCEDURE — 2580000003 HC RX 258: Performed by: SPECIALIST

## 2020-03-25 PROCEDURE — 6360000002 HC RX W HCPCS: Performed by: INTERNAL MEDICINE

## 2020-03-25 PROCEDURE — 2060000000 HC ICU INTERMEDIATE R&B

## 2020-03-25 PROCEDURE — 82962 GLUCOSE BLOOD TEST: CPT

## 2020-03-25 PROCEDURE — 6370000000 HC RX 637 (ALT 250 FOR IP): Performed by: INTERNAL MEDICINE

## 2020-03-25 PROCEDURE — APPSS45 APP SPLIT SHARED TIME 31-45 MINUTES: Performed by: CLINICAL NURSE SPECIALIST

## 2020-03-25 PROCEDURE — 2700000000 HC OXYGEN THERAPY PER DAY

## 2020-03-25 PROCEDURE — 6360000002 HC RX W HCPCS: Performed by: NURSE PRACTITIONER

## 2020-03-25 RX ORDER — 0.9 % SODIUM CHLORIDE 0.9 %
20 INTRAVENOUS SOLUTION INTRAVENOUS ONCE
Status: COMPLETED | OUTPATIENT
Start: 2020-03-25 | End: 2020-03-25

## 2020-03-25 RX ORDER — DOXYCYCLINE HYCLATE 100 MG/1
100 CAPSULE ORAL EVERY 12 HOURS SCHEDULED
Qty: 14 CAPSULE | Refills: 0 | Status: SHIPPED | OUTPATIENT
Start: 2020-03-25 | End: 2020-03-31

## 2020-03-25 RX ORDER — PANTOPRAZOLE SODIUM 40 MG/10ML
40 INJECTION, POWDER, LYOPHILIZED, FOR SOLUTION INTRAVENOUS 2 TIMES DAILY
Status: DISCONTINUED | OUTPATIENT
Start: 2020-03-25 | End: 2020-03-31 | Stop reason: HOSPADM

## 2020-03-25 RX ORDER — AMOXICILLIN AND CLAVULANATE POTASSIUM 875; 125 MG/1; MG/1
1 TABLET, FILM COATED ORAL EVERY 12 HOURS SCHEDULED
Qty: 14 TABLET | Refills: 0 | Status: SHIPPED | OUTPATIENT
Start: 2020-03-25 | End: 2020-03-31

## 2020-03-25 RX ORDER — FERROUS SULFATE 325(65) MG
325 TABLET ORAL 2 TIMES DAILY WITH MEALS
Qty: 30 TABLET | Refills: 3 | Status: SHIPPED | OUTPATIENT
Start: 2020-03-25 | End: 2020-03-31

## 2020-03-25 RX ORDER — SODIUM CHLORIDE 9 MG/ML
10 INJECTION INTRAVENOUS 2 TIMES DAILY
Status: DISCONTINUED | OUTPATIENT
Start: 2020-03-25 | End: 2020-03-31 | Stop reason: HOSPADM

## 2020-03-25 RX ORDER — SODIUM CHLORIDE 9 MG/ML
INJECTION, SOLUTION INTRAVENOUS CONTINUOUS
Status: DISCONTINUED | OUTPATIENT
Start: 2020-03-25 | End: 2020-03-26

## 2020-03-25 RX ADMIN — IPRATROPIUM BROMIDE AND ALBUTEROL SULFATE 1 AMPULE: 2.5; .5 SOLUTION RESPIRATORY (INHALATION) at 08:25

## 2020-03-25 RX ADMIN — Medication 10 ML: at 17:00

## 2020-03-25 RX ADMIN — LATANOPROST 1 DROP: 50 SOLUTION OPHTHALMIC at 20:27

## 2020-03-25 RX ADMIN — FERROUS SULFATE TAB 325 MG (65 MG ELEMENTAL FE) 325 MG: 325 (65 FE) TAB at 18:02

## 2020-03-25 RX ADMIN — SODIUM CHLORIDE 20 ML: 9 INJECTION, SOLUTION INTRAVENOUS at 20:31

## 2020-03-25 RX ADMIN — AMPICILLIN AND SULBACTAM 3 G: 1; 2 INJECTION, POWDER, FOR SOLUTION INTRAMUSCULAR; INTRAVENOUS at 02:12

## 2020-03-25 RX ADMIN — ACETAMINOPHEN 650 MG: 325 TABLET ORAL at 00:28

## 2020-03-25 RX ADMIN — Medication 10 ML: at 20:29

## 2020-03-25 RX ADMIN — FERROUS SULFATE TAB 325 MG (65 MG ELEMENTAL FE) 325 MG: 325 (65 FE) TAB at 08:05

## 2020-03-25 RX ADMIN — SODIUM CHLORIDE, PRESERVATIVE FREE 10 ML: 5 INJECTION INTRAVENOUS at 20:27

## 2020-03-25 RX ADMIN — AMOXICILLIN AND CLAVULANATE POTASSIUM 1 TABLET: 875; 125 TABLET, FILM COATED ORAL at 09:07

## 2020-03-25 RX ADMIN — AMOXICILLIN AND CLAVULANATE POTASSIUM 1 TABLET: 875; 125 TABLET, FILM COATED ORAL at 20:27

## 2020-03-25 RX ADMIN — DOXAZOSIN 2 MG: 2 TABLET ORAL at 09:07

## 2020-03-25 RX ADMIN — LISINOPRIL 10 MG: 10 TABLET ORAL at 09:07

## 2020-03-25 RX ADMIN — SODIUM CHLORIDE: 9 INJECTION, SOLUTION INTRAVENOUS at 18:06

## 2020-03-25 RX ADMIN — IPRATROPIUM BROMIDE AND ALBUTEROL SULFATE 1 AMPULE: 2.5; .5 SOLUTION RESPIRATORY (INHALATION) at 04:15

## 2020-03-25 RX ADMIN — DOXYCYCLINE HYCLATE 100 MG: 100 CAPSULE ORAL at 09:07

## 2020-03-25 RX ADMIN — ENOXAPARIN SODIUM 30 MG: 30 INJECTION SUBCUTANEOUS at 09:07

## 2020-03-25 RX ADMIN — ACETAMINOPHEN 650 MG: 325 TABLET ORAL at 09:11

## 2020-03-25 RX ADMIN — Medication 10 ML: at 09:08

## 2020-03-25 RX ADMIN — PANTOPRAZOLE SODIUM 40 MG: 40 INJECTION, POWDER, FOR SOLUTION INTRAVENOUS at 20:27

## 2020-03-25 RX ADMIN — DOXYCYCLINE HYCLATE 100 MG: 100 CAPSULE ORAL at 20:27

## 2020-03-25 RX ADMIN — ACETAMINOPHEN 650 MG: 325 TABLET ORAL at 23:00

## 2020-03-25 ASSESSMENT — PAIN DESCRIPTION - LOCATION
LOCATION: HIP
LOCATION: HIP

## 2020-03-25 ASSESSMENT — PAIN SCALES - GENERAL
PAINLEVEL_OUTOF10: 0
PAINLEVEL_OUTOF10: 8
PAINLEVEL_OUTOF10: 8
PAINLEVEL_OUTOF10: 7
PAINLEVEL_OUTOF10: 0
PAINLEVEL_OUTOF10: 10

## 2020-03-25 ASSESSMENT — PAIN DESCRIPTION - DESCRIPTORS: DESCRIPTORS: ACHING;DISCOMFORT;SORE

## 2020-03-25 ASSESSMENT — PAIN DESCRIPTION - ONSET
ONSET: ON-GOING
ONSET: ON-GOING

## 2020-03-25 ASSESSMENT — PAIN DESCRIPTION - ORIENTATION
ORIENTATION: RIGHT
ORIENTATION: RIGHT

## 2020-03-25 ASSESSMENT — PAIN DESCRIPTION - PAIN TYPE
TYPE: ACUTE PAIN
TYPE: ACUTE PAIN

## 2020-03-25 ASSESSMENT — PAIN DESCRIPTION - PROGRESSION: CLINICAL_PROGRESSION: NOT CHANGED

## 2020-03-25 ASSESSMENT — PAIN DESCRIPTION - FREQUENCY: FREQUENCY: CONTINUOUS

## 2020-03-25 NOTE — PROGRESS NOTES
Informed patient that we had an order from pulmonary to ambulate him on room air to see if he needed oxygen continuous. Patient refused to walk and said he cant walk and that's why he is going to rehab facility.  Patient was 95% on room air while resting

## 2020-03-25 NOTE — PROGRESS NOTES
HGB 7.6 (L) 03/24/2020    HCT 25.9 (L) 03/24/2020    MCV 90.9 03/24/2020    PLT 59 (L) 03/24/2020     Lab Results   Component Value Date    NEUTROABS 3.43 03/21/2020    NEUTROABS 4.39 03/20/2020    NEUTROABS 1.39 (L) 09/20/2019     No results found for: [facility]  Lab Results   Component Value Date    ALT 10 03/21/2020    AST 24 03/21/2020    ALKPHOS 48 03/21/2020    BILITOT <0.2 03/21/2020     Lab Results   Component Value Date     03/24/2020    K 3.7 03/24/2020    K 3.5 03/21/2020     03/24/2020    CO2 19 03/24/2020    BUN 17 03/24/2020    CREATININE 1.0 03/24/2020    CREATININE 1.0 03/23/2020    CREATININE 1.1 03/22/2020    GFRAA >60 03/24/2020    LABGLOM >60 03/24/2020    GLUCOSE 121 03/24/2020    PROT 5.7 03/21/2020    LABALBU 2.6 03/21/2020    CALCIUM 8.1 03/24/2020    BILITOT <0.2 03/21/2020    ALKPHOS 48 03/21/2020    AST 24 03/21/2020    ALT 10 03/21/2020     Lab Results   Component Value Date    CRP 12.3 (H) 03/25/2020     No results found for: [address]  Radiology:  Swallow evaluation noted    Microbiology:   Respiratory panel: Negative  Streptococcus pneumoniae/Legionella urine Ag: negative  Rapid influenza: Negative  Blood cultures 3/21/2020: Negative so far  Respiratory culture 3/21/2020 OP fer present    ASSESSMENT:  · Left lower lobe community-acquired pneumonia. Improved clinically  · Possible dysphagia. Did okay with swallow evaluation  · Fever secondary to pneumonia, resolving     Plan:    · Now on augmentin/doxy  · can be discharged on 7 days of antibiotics. Med rec completed     April [address] [facility],[address]  12:00 PM  3/25/2020     Patient seen and examined. I had a face to face encounter with the patient. Agree with exam.  Assessment and plan as outlined above and directed by me. Addition and corrections were done as deemed appropriate. My exam and plan include: The patient is doing much better. He is tolerating Augmentin and Doxycycline. He will be discharged to [facility]. Antibiotics have been reconciled. We will be seeing him on an as-needed basis.     Romero Lara  3/25/2020

## 2020-03-25 NOTE — PROGRESS NOTES
Nurse to nurse called to [address]. All questions answered, accompanied patient to 6th floor and handed off patient to [facility]

## 2020-03-25 NOTE — PROGRESS NOTES
Pulmonary Progress Note    Admit Date: 3/20/2020  Hospital day                               PCP: No primary care provider on file. Chief Complaint (s):  Patient Active Problem List   Diagnosis    Pneumonia due to organism    Anemia    HTN (hypertension)    Acute kidney injury ([address]    Acute hyperglycemia    Community acquired pneumonia    Elevated troponin    Aspiration pneumonia (HCC)       Subjective:  · Continues to feel well. Continues to be afebrile. Vitals:  VITALS:  BP (!) 183/88   Pulse 87   Temp 98.1 °F (36.7 °C) (Oral)   Resp 15   Ht 5' 4\" (1.626 m)   Wt 142 lb 4.8 oz (64.5 kg)   SpO2 94%   BMI 24.43 kg/m²     24HR INTAKE/OUTPUT:      Intake/Output Summary (Last 24 hours) at 3/25/2020 1052  Last data filed at 3/25/2020 6011  Gross per 24 hour   Intake 2731 ml   Output 500 ml   Net 2231 ml       24HR PULSE OXIMETRY RANGE:    SpO2  Av.3 %  Min: 94 %  Max: 96 %    Medications:  IV:   sodium chloride 50 mL/hr at 20 0645       Scheduled Meds:   amoxicillin-clavulanate  1 tablet Oral 2 times per day    lisinopril  10 mg Oral Daily    ferrous sulfate  325 mg Oral BID WC    ipratropium-albuterol  1 ampule Inhalation Q4H    doxycycline hyclate  100 mg Oral 2 times per day    latanoprost  1 drop Right Eye Nightly    doxazosin  2 mg Oral Daily    sodium chloride flush  10 mL Intravenous 2 times per day    enoxaparin  30 mg Subcutaneous Daily       Diet:   DIET GENERAL;  Dietary Nutrition Supplements: Standard High Calorie Oral Supplement     EXAM:  General: No distress. Alert. Eyes: PERRL. No sclera icterus. No conjunctival injection. ENT: No discharge. Pharynx clear. Neck: Trachea midline. Normal thyroid. Resp: No accessory muscle use. Bibasilar rales. No wheezing. No rhonchi. CV: Regular rate. Regular rhythm. No murmur or rub. Abd: Non-tender. Non-distended. No masses. No organomegaly. Normal bowel sounds. Skin: Warm and dry.  No nodule on exposed extremities. No rash on exposed extremities. Ext: No cyanosis, clubbing, edema  Lymph: No cervical LAD. No supraclavicular LAD. M/S: No cyanosis. No joint deformity. No clubbing. Neuro: Awake. Follows commands. Positive pupils/gag/corneals. Normal pain response. Results:  CBC:   Recent Labs     03/23/20  0500 03/24/20  0230   WBC 5.8 4.1*   HGB 7.8* 7.6*   HCT 28.0* 25.9*   MCV 96.2 90.9   PLT 64* 59*     BMP:   Recent Labs     03/23/20  0500 03/24/20  0230    140   K 3.6 3.7    107   CO2 19* 19*   BUN 22 17   CREATININE 1.0 1.0     LIVER PROFILE:   Recent Labs     03/23/20  0500 03/24/20  0230   LIPASE 518* 486*     PT/INR: No results for input(s): PROTIME, INR in the last 72 hours. APTT: No results for input(s): APTT in the last 72 hours. Pathology:  1. N/A      Microbiology:  1. None    Recent ABG:   No results for input(s): PH, PO2, PCO2, HCO3, BE, O2SAT, METHB, O2HB, COHB, O2CON, HHB, THB in the last 72 hours. Recent Films:  XR CHEST STANDARD (2 VW)   Final Result   Interval worsening dense airspace opacities in the lower lungs   bilaterally. Concerning for multifocal versus viral pneumonia. The exam has been dictated and signed by Heather Pool. LEON Ku   and Efrain Hurd MD, reviewed and concurred with these findings. FL MODIFIED BARIUM SWALLOW W VIDEO   Final Result   Study is remarkable for transient laryngeal penetration   with thin consistency. This procedure was performed and dictated by Dot Smallwood PA-C with   indirect supervision, and Priscila Hassan. Don Hurd MD reviewed and concurred with   the findings. XR CHEST PORTABLE   Final Result   There is some hazy density in the infrahilar regions, improved in   comparison with the prior studies, suggesting resolving peribronchial   interstitial pneumonitis. No new or progressive pathologic findings are evident. CT CHEST WO CONTRAST   Final Result   Opacities at both lung bases.

## 2020-03-25 NOTE — PROGRESS NOTES
SPEECH LANGUAGE PATHOLOGY  DAILY PROGRESS NOTE        PATIENT NAME:  Monik Chris      :  3/10/1931          TODAY'S DATE:  3/25/2020 ROOM:  8074/4361-U        SWALLOWING    Chart reviewed, including most recent chest radiograph. Pt reports c/o of swallowing on admit was due to odynophagia. Pt reports this has resolved. Pt accepted thin liquids per straw this date. Poor follow through with compensatory strategies this date. Large bolus per straw resulted in wet vocal quality. Pt re educated on need to use compensatory strategies including small sips with all liquids. Pt verbalized understanding.                   DYSPHAGIA DIAGNOSIS:  Mild oropharyngeal dysphagia-no aspiration observed                            DIET RECOMMENDATIONS:  Regular consistency solids with thin liquids                 FEEDING RECOMMENDATIONS:                              Assistance level:  No assistance needed                             Compensatory strategies recommended: Double swallow, Small bites/sips and Alternate solids and liquids        Oral- adequate labial seal and A-P transfer, no oral residue      Pharyngeal- wet vocal quality with large bolus thin liquid per straw this date, no multiple swallows      Education- Pt educated on results and POC. Pt trained on compensatory strategies for safe swallow with good outcome. Questions answered. Will continue SP intervention as per previously established POC. Sal DUDLEY CCC/SLP [id number]  Speech Pathologist                    CPT code(s) 27521  dysphagia tx  Total minutes :  30 minutes

## 2020-03-25 NOTE — FLOWSHEET NOTE
responded to an RRT. Upon arrival, patient was being tended to by several members of the RRT medical team.  Patient appeared to regain consciousness.  was informed by medical team that the patient was in the process of being discharged and transported, when all of a sudden the patient began to slouch in the wheelchair while his eyes rolled in his head. Medical staff stated that the patient appeared to have some sort of seizure. Patient then prayed silently and for one of the staff members. The visit was ended shortly thereafter.

## 2020-03-25 NOTE — PROGRESS NOTES
Call placed to patient's wife Doris Guerra.  Provide her new room number and [facility]'s number

## 2020-03-25 NOTE — DISCHARGE SUMMARY
[facility] Physician Discharge Summary       No follow-up provider specified. Activity level: As tolerated     Dispo: SNF    Condition on discharge: Stable     Patient ID:  Wu Finn  [id number]  45 y.o.  3/10/1931    Admit date: 3/20/2020    Discharge date and time:  3/25/2020  12:48 PM    Admission Diagnoses: Principal Problem:    Pneumonia due to organism  Active Problems:    Anemia    HTN (hypertension)    Acute kidney injury ([address]    Acute hyperglycemia    Community acquired pneumonia    Elevated troponin    Aspiration pneumonia ([address]  Resolved Problems:    * No resolved hospital problems. *      Discharge Diagnoses: Principal Problem:    Pneumonia due to organism  Active Problems:    Anemia    HTN (hypertension)    Acute kidney injury ([facility]    Acute hyperglycemia    Community acquired pneumonia    Elevated troponin    Aspiration pneumonia ([facility]  Resolved Problems:    * No resolved hospital problems. *      Consults:  IP CONSULT TO PRIMARY CARE PROVIDER  IP CONSULT TO SOCIAL WORK  IP CONSULT TO PULMONOLOGY  IP CONSULT TO INFECTIOUS DISEASES    Procedures:   No inpatient hospital procedures    Hospital Course:   Patient Wu Finn is a 80 y.o. presented with Pneumonia [J18.9]  Pneumonia [J18.9]  This is an 22-year-old  man with a past medical history of EMEA, hypertension, acute kidney injury, chronic pain, osteoarthritis, iron deficiency anemia. Patient admitted 3/20/2020 coming in from the ER with productive  cough intermittent fever weakness and shortness of breath for about a week. Noted on admission he had been taking Robitussin and Tylenol as an outpatient. Chest x-ray showed developing airspace disease and inflammation. CT of the chest showed opacities in both lungs especially the left with a trace left pleural effusion. Darted on IV antibiotics infectious disease consulted switch over to Augmentin and doxycycline.   Pulmonary was consulted -DuoNeb tenderness. Osteoarthritic changes to the bilateral hands and digits. Right elbow large nodular area. Neurologic: no cranial nerve deficit and speech normal    I/O last 3 completed shifts: In: 2541 [I.V.:2731]  Out: 625 [Urine:625]  No intake/output data recorded. LABS:  Recent Labs     20  0500 20  0230    140   K 3.6 3.7    107   CO2 19* 19*   BUN 22 17   CREATININE 1.0 1.0   GLUCOSE 113* 121*   CALCIUM 8.7 8.1*       Recent Labs     20  0500 20  0230   WBC 5.8 4.1*   RBC 2.91* 2.85*   HGB 7.8* 7.6*   HCT 28.0* 25.9*   MCV 96.2 90.9   MCH 26.8 26.7   MCHC 27.9* 29.3*   RDW 19.1* 18.9*   PLT 64* 59*   MPV NOT CALC NOT CALC       No results for input(s): POCGLU in the last 72 hours. Imaging:  Ct Chest Wo Contrast    Result Date: 3/20/2020  Patient MRN:  [id number] Patient :  3/10/1931 Patient Age:  80 years Patient Gender:  Male Order Date:3/20/2020 7:30 AM EXAM:  CT CHEST WO CONTRAST NUMBER OF IMAGES:  257 INDICATION:   cough, fever COMPARISON: None. TECHNIQUE: Multiple axial images were obtained from the apices of the lungs through the lung bases. Sagittal and coronal reconstructions performed for aid in interpretation of the study. Technique: Low-dose CT  acquisition technique included one of following options; 1 . Automated exposure control, 2. Adjustment of MA and or KV according to patient's size or 3. Use of iterative reconstruction. FINDINGS: LUNGS: There are opacities at both lower lobes. I suspect that there is likely a complement of infiltrate as well as atelectasis. This is especially so on the left. There is trace pleural effusion on the left. HEART: Unremarkable. AORTA:  Unremarkable. MEDIASTINUM:  Nonspecific subcarinal adenopathy present. Erven Hurl UPPER ABDOMEN: Benign appearing left renal cyst present. OTHER:Unremarkable     Opacities at both lung bases. Suspect a likely combination of atelectasis and infiltrate, especially on the left.  Trace left pleural effusion. See above. Ct Abdomen Pelvis W Iv Contrast Additional Contrast? None    Result Date: 3/20/2020  Patient MRN:  [id number] : 3/10/1931 Age: 80 years Gender: Male Order Date:  3/20/2020 8:00 AM EXAM: CT ABDOMEN PELVIS W IV CONTRAST number of images 333. Contrast. Isovue-370, 110 mL intravenously. Technique: Low-dose CT  acquisition technique included one of following options; 1 . Automated exposure control, 2. Adjustment of MA and or KV according to patient's size or 3. Use of iterative reconstruction. INDICATION:  elevated lipase, fever elevated lipase, fever COMPARISON: None FINDINGS: The lung bases demonstrate cardiomegaly. There is patchy bibasilar infiltrates and small pleural effusions. There is hepatomegaly with liver measuring 19 cm with  diffuse low attenuation which may be due to liver disease. Gallbladder is partially contracted with a mild wall thickening. Consider hepatobiliary scan. The spleen, pancreas, the adrenals and the kidneys are normal with bilateral renal cyst, the largest one in the left kidney measuring 5 x 6 cm. There is diffuse degenerative changes in the lumbar spine. Pelvis. The bladder is distended. There is constipation. The appendix is not clearly identified. Patchy bibasilar infiltrates and small pleural effusion which may be due to pneumonia or edema. Hepatomegaly with  possible liver disease. Consider hepatobiliary scan for evaluation of gallbladder. Xr Chest Portable    Result Date: 3/21/2020  Patient MRN: [id number] : 3/10/1931 Age:  80 years Gender: Male Order Date: 3/21/2020 10:30 AM Exam: XR CHEST PORTABLE Number of Images: 1 view Indication:  Shortness of breath Comparison: CT chest without contrast 2020, anterior upright chest 2020 Findings: The lungs are symmetrically expanded, and show diminished prominence of the infrahilar density noted on the prior studies, consistent with incomplete response to treatment.  No new or progressive

## 2020-03-26 ENCOUNTER — APPOINTMENT (OUTPATIENT)
Dept: NEUROLOGY | Age: 85
DRG: 177 | End: 2020-03-26
Payer: MEDICARE

## 2020-03-26 ENCOUNTER — APPOINTMENT (OUTPATIENT)
Dept: MRI IMAGING | Age: 85
DRG: 177 | End: 2020-03-26
Payer: MEDICARE

## 2020-03-26 ENCOUNTER — APPOINTMENT (OUTPATIENT)
Dept: NUCLEAR MEDICINE | Age: 85
DRG: 177 | End: 2020-03-26
Payer: MEDICARE

## 2020-03-26 LAB
ABO/RH: NORMAL
ABO/RH: NORMAL
ALBUMIN SERPL-MCNC: 2.4 G/DL (ref 3.5–5.2)
ALP BLD-CCNC: 52 U/L (ref 40–129)
ALT SERPL-CCNC: 21 U/L (ref 0–40)
AMYLASE: 234 U/L (ref 20–100)
ANION GAP SERPL CALCULATED.3IONS-SCNC: 13 MMOL/L (ref 7–16)
ANISOCYTOSIS: ABNORMAL
ANTIBODY SCREEN: NORMAL
AST SERPL-CCNC: 38 U/L (ref 0–39)
BASOPHILS ABSOLUTE: 0 E9/L (ref 0–0.2)
BASOPHILS RELATIVE PERCENT: 0 % (ref 0–2)
BILIRUB SERPL-MCNC: 0.7 MG/DL (ref 0–1.2)
BLOOD CULTURE, ROUTINE: NORMAL
BUN BLDV-MCNC: 20 MG/DL (ref 8–23)
CALCIUM SERPL-MCNC: 8.2 MG/DL (ref 8.6–10.2)
CEA: 2.1 NG/ML (ref 0–5.2)
CHLORIDE BLD-SCNC: 105 MMOL/L (ref 98–107)
CO2: 21 MMOL/L (ref 22–29)
CREAT SERPL-MCNC: 1.1 MG/DL (ref 0.7–1.2)
CULTURE, BLOOD 2: NORMAL
DAT POLYSPECIFIC: NORMAL
EKG ATRIAL RATE: 97 BPM
EKG P AXIS: 60 DEGREES
EKG P-R INTERVAL: 182 MS
EKG Q-T INTERVAL: 372 MS
EKG QRS DURATION: 70 MS
EKG QTC CALCULATION (BAZETT): 472 MS
EKG R AXIS: 12 DEGREES
EKG T AXIS: 30 DEGREES
EKG VENTRICULAR RATE: 97 BPM
EOSINOPHILS ABSOLUTE: 0 E9/L (ref 0.05–0.5)
EOSINOPHILS RELATIVE PERCENT: 0 % (ref 0–6)
GFR AFRICAN AMERICAN: >60
GFR NON-AFRICAN AMERICAN: >60 ML/MIN/1.73
GLUCOSE BLD-MCNC: 119 MG/DL (ref 74–99)
HCT VFR BLD CALC: 19 % (ref 37–54)
HCT VFR BLD CALC: 23.3 % (ref 37–54)
HEMOGLOBIN: 5.8 G/DL (ref 12.5–16.5)
HEMOGLOBIN: 7.2 G/DL (ref 12.5–16.5)
HYPOCHROMIA: ABNORMAL
INR BLD: 1.2
LIPASE: 337 U/L (ref 13–60)
LYMPHOCYTES ABSOLUTE: 1.64 E9/L (ref 1.5–4)
LYMPHOCYTES RELATIVE PERCENT: 14.8 % (ref 20–42)
MAGNESIUM: 1.4 MG/DL (ref 1.6–2.6)
MCH RBC QN AUTO: 27.6 PG (ref 26–35)
MCHC RBC AUTO-ENTMCNC: 30.9 % (ref 32–34.5)
MCV RBC AUTO: 89.3 FL (ref 80–99.9)
MONOCYTES ABSOLUTE: 2.29 E9/L (ref 0.1–0.95)
MONOCYTES RELATIVE PERCENT: 20.9 % (ref 2–12)
MYELOCYTE PERCENT: 1.7 % (ref 0–0)
NEUTROPHILS ABSOLUTE: 6.98 E9/L (ref 1.8–7.3)
NEUTROPHILS RELATIVE PERCENT: 62.6 % (ref 43–80)
NUCLEATED RED BLOOD CELLS: 0 /100 WBC
PDW BLD-RTO: 16.7 FL (ref 11.5–15)
PHOSPHORUS: 4.5 MG/DL (ref 2.5–4.5)
PLATELET # BLD: 63 E9/L (ref 130–450)
PLATELET CONFIRMATION: NORMAL
PMV BLD AUTO: 14 FL (ref 7–12)
POLYCHROMASIA: ABNORMAL
POTASSIUM SERPL-SCNC: 4 MMOL/L (ref 3.5–5)
PROTHROMBIN TIME: 14.1 SEC (ref 9.3–12.4)
RBC # BLD: 2.61 E12/L (ref 3.8–5.8)
SODIUM BLD-SCNC: 139 MMOL/L (ref 132–146)
TOTAL PROTEIN: 6.1 G/DL (ref 6.4–8.3)
WBC # BLD: 10.9 E9/L (ref 4.5–11.5)

## 2020-03-26 PROCEDURE — 94640 AIRWAY INHALATION TREATMENT: CPT

## 2020-03-26 PROCEDURE — 85025 COMPLETE CBC W/AUTO DIFF WBC: CPT

## 2020-03-26 PROCEDURE — P9016 RBC LEUKOCYTES REDUCED: HCPCS

## 2020-03-26 PROCEDURE — 6360000002 HC RX W HCPCS: Performed by: CLINICAL NURSE SPECIALIST

## 2020-03-26 PROCEDURE — 3430000000 HC RX DIAGNOSTIC RADIOPHARMACEUTICAL: Performed by: RADIOLOGY

## 2020-03-26 PROCEDURE — 2700000000 HC OXYGEN THERAPY PER DAY

## 2020-03-26 PROCEDURE — 97535 SELF CARE MNGMENT TRAINING: CPT

## 2020-03-26 PROCEDURE — 99221 1ST HOSP IP/OBS SF/LOW 40: CPT | Performed by: PSYCHIATRY & NEUROLOGY

## 2020-03-26 PROCEDURE — 2580000003 HC RX 258: Performed by: INTERNAL MEDICINE

## 2020-03-26 PROCEDURE — 86880 COOMBS TEST DIRECT: CPT

## 2020-03-26 PROCEDURE — 6360000002 HC RX W HCPCS: Performed by: INTERNAL MEDICINE

## 2020-03-26 PROCEDURE — A9560 TC99M LABELED RBC: HCPCS | Performed by: RADIOLOGY

## 2020-03-26 PROCEDURE — 97530 THERAPEUTIC ACTIVITIES: CPT

## 2020-03-26 PROCEDURE — 82378 CARCINOEMBRYONIC ANTIGEN: CPT

## 2020-03-26 PROCEDURE — 86900 BLOOD TYPING SEROLOGIC ABO: CPT

## 2020-03-26 PROCEDURE — 80053 COMPREHEN METABOLIC PANEL: CPT

## 2020-03-26 PROCEDURE — 78278 ACUTE GI BLOOD LOSS IMAGING: CPT

## 2020-03-26 PROCEDURE — 82150 ASSAY OF AMYLASE: CPT

## 2020-03-26 PROCEDURE — 93010 ELECTROCARDIOGRAM REPORT: CPT | Performed by: INTERNAL MEDICINE

## 2020-03-26 PROCEDURE — 82787 IGG 1 2 3 OR 4 EACH: CPT

## 2020-03-26 PROCEDURE — 86922 COMPATIBILITY TEST ANTIGLOB: CPT

## 2020-03-26 PROCEDURE — 86255 FLUORESCENT ANTIBODY SCREEN: CPT

## 2020-03-26 PROCEDURE — 6370000000 HC RX 637 (ALT 250 FOR IP): Performed by: NURSE PRACTITIONER

## 2020-03-26 PROCEDURE — C9113 INJ PANTOPRAZOLE SODIUM, VIA: HCPCS | Performed by: INTERNAL MEDICINE

## 2020-03-26 PROCEDURE — 83735 ASSAY OF MAGNESIUM: CPT

## 2020-03-26 PROCEDURE — 36415 COLL VENOUS BLD VENIPUNCTURE: CPT

## 2020-03-26 PROCEDURE — 6370000000 HC RX 637 (ALT 250 FOR IP): Performed by: SPECIALIST

## 2020-03-26 PROCEDURE — 92526 ORAL FUNCTION THERAPY: CPT | Performed by: SPEECH-LANGUAGE PATHOLOGIST

## 2020-03-26 PROCEDURE — 80074 ACUTE HEPATITIS PANEL: CPT

## 2020-03-26 PROCEDURE — 84100 ASSAY OF PHOSPHORUS: CPT

## 2020-03-26 PROCEDURE — 2060000000 HC ICU INTERMEDIATE R&B

## 2020-03-26 PROCEDURE — 99233 SBSQ HOSP IP/OBS HIGH 50: CPT | Performed by: INTERNAL MEDICINE

## 2020-03-26 PROCEDURE — 85610 PROTHROMBIN TIME: CPT

## 2020-03-26 PROCEDURE — 83690 ASSAY OF LIPASE: CPT

## 2020-03-26 PROCEDURE — 6360000002 HC RX W HCPCS: Performed by: NURSE PRACTITIONER

## 2020-03-26 PROCEDURE — 70551 MRI BRAIN STEM W/O DYE: CPT

## 2020-03-26 PROCEDURE — 2580000003 HC RX 258: Performed by: NURSE PRACTITIONER

## 2020-03-26 PROCEDURE — 86923 COMPATIBILITY TEST ELECTRIC: CPT

## 2020-03-26 PROCEDURE — 86038 ANTINUCLEAR ANTIBODIES: CPT

## 2020-03-26 PROCEDURE — APPSS30 APP SPLIT SHARED TIME 16-30 MINUTES: Performed by: CLINICAL NURSE SPECIALIST

## 2020-03-26 PROCEDURE — 85018 HEMOGLOBIN: CPT

## 2020-03-26 PROCEDURE — 82105 ALPHA-FETOPROTEIN SERUM: CPT

## 2020-03-26 PROCEDURE — 86850 RBC ANTIBODY SCREEN: CPT

## 2020-03-26 PROCEDURE — 95819 EEG AWAKE AND ASLEEP: CPT

## 2020-03-26 PROCEDURE — 6370000000 HC RX 637 (ALT 250 FOR IP): Performed by: INTERNAL MEDICINE

## 2020-03-26 PROCEDURE — 86901 BLOOD TYPING SEROLOGIC RH(D): CPT

## 2020-03-26 PROCEDURE — 85014 HEMATOCRIT: CPT

## 2020-03-26 PROCEDURE — 86301 IMMUNOASSAY TUMOR CA 19-9: CPT

## 2020-03-26 RX ORDER — MAGNESIUM SULFATE 1 G/100ML
1 INJECTION INTRAVENOUS ONCE
Status: COMPLETED | OUTPATIENT
Start: 2020-03-26 | End: 2020-03-26

## 2020-03-26 RX ADMIN — ENOXAPARIN SODIUM 30 MG: 30 INJECTION SUBCUTANEOUS at 08:31

## 2020-03-26 RX ADMIN — Medication 10 ML: at 08:54

## 2020-03-26 RX ADMIN — FERROUS SULFATE TAB 325 MG (65 MG ELEMENTAL FE) 325 MG: 325 (65 FE) TAB at 08:31

## 2020-03-26 RX ADMIN — DOXAZOSIN 2 MG: 2 TABLET ORAL at 08:31

## 2020-03-26 RX ADMIN — IPRATROPIUM BROMIDE AND ALBUTEROL SULFATE 1 AMPULE: 2.5; .5 SOLUTION RESPIRATORY (INHALATION) at 21:12

## 2020-03-26 RX ADMIN — SODIUM CHLORIDE, PRESERVATIVE FREE 10 ML: 5 INJECTION INTRAVENOUS at 06:23

## 2020-03-26 RX ADMIN — LISINOPRIL 10 MG: 10 TABLET ORAL at 08:31

## 2020-03-26 RX ADMIN — IPRATROPIUM BROMIDE AND ALBUTEROL SULFATE 1 AMPULE: 2.5; .5 SOLUTION RESPIRATORY (INHALATION) at 17:18

## 2020-03-26 RX ADMIN — DOXYCYCLINE HYCLATE 100 MG: 100 CAPSULE ORAL at 20:39

## 2020-03-26 RX ADMIN — PANTOPRAZOLE SODIUM 40 MG: 40 INJECTION, POWDER, FOR SOLUTION INTRAVENOUS at 20:38

## 2020-03-26 RX ADMIN — SODIUM CHLORIDE: 9 INJECTION, SOLUTION INTRAVENOUS at 06:07

## 2020-03-26 RX ADMIN — DOXYCYCLINE HYCLATE 100 MG: 100 CAPSULE ORAL at 08:31

## 2020-03-26 RX ADMIN — ACETAMINOPHEN 650 MG: 325 TABLET ORAL at 08:39

## 2020-03-26 RX ADMIN — IPRATROPIUM BROMIDE AND ALBUTEROL SULFATE 1 AMPULE: 2.5; .5 SOLUTION RESPIRATORY (INHALATION) at 08:45

## 2020-03-26 RX ADMIN — Medication 20 MILLICURIE: at 12:37

## 2020-03-26 RX ADMIN — AMOXICILLIN AND CLAVULANATE POTASSIUM 1 TABLET: 875; 125 TABLET, FILM COATED ORAL at 08:31

## 2020-03-26 RX ADMIN — PANTOPRAZOLE SODIUM 40 MG: 40 INJECTION, POWDER, FOR SOLUTION INTRAVENOUS at 06:23

## 2020-03-26 RX ADMIN — Medication 10 ML: at 20:43

## 2020-03-26 RX ADMIN — MAGNESIUM SULFATE HEPTAHYDRATE 1 G: 1 INJECTION, SOLUTION INTRAVENOUS at 08:39

## 2020-03-26 RX ADMIN — AMOXICILLIN AND CLAVULANATE POTASSIUM 1 TABLET: 875; 125 TABLET, FILM COATED ORAL at 20:39

## 2020-03-26 RX ADMIN — SODIUM CHLORIDE, PRESERVATIVE FREE 10 ML: 5 INJECTION INTRAVENOUS at 20:40

## 2020-03-26 RX ADMIN — IPRATROPIUM BROMIDE AND ALBUTEROL SULFATE 1 AMPULE: 2.5; .5 SOLUTION RESPIRATORY (INHALATION) at 01:20

## 2020-03-26 ASSESSMENT — ENCOUNTER SYMPTOMS
PHOTOPHOBIA: 0
VOMITING: 0
TROUBLE SWALLOWING: 0
SHORTNESS OF BREATH: 0
NAUSEA: 0

## 2020-03-26 ASSESSMENT — PAIN DESCRIPTION - PAIN TYPE: TYPE: CHRONIC PAIN

## 2020-03-26 ASSESSMENT — PAIN DESCRIPTION - PROGRESSION: CLINICAL_PROGRESSION: GRADUALLY WORSENING

## 2020-03-26 ASSESSMENT — PAIN - FUNCTIONAL ASSESSMENT: PAIN_FUNCTIONAL_ASSESSMENT: PREVENTS OR INTERFERES SOME ACTIVE ACTIVITIES AND ADLS

## 2020-03-26 ASSESSMENT — PAIN DESCRIPTION - ONSET: ONSET: ON-GOING

## 2020-03-26 ASSESSMENT — PAIN SCALES - GENERAL
PAINLEVEL_OUTOF10: 0
PAINLEVEL_OUTOF10: 0
PAINLEVEL_OUTOF10: 5

## 2020-03-26 ASSESSMENT — PAIN DESCRIPTION - ORIENTATION: ORIENTATION: RIGHT

## 2020-03-26 ASSESSMENT — PAIN DESCRIPTION - LOCATION: LOCATION: KNEE

## 2020-03-26 ASSESSMENT — PAIN DESCRIPTION - DESCRIPTORS: DESCRIPTORS: THROBBING

## 2020-03-26 ASSESSMENT — PAIN DESCRIPTION - FREQUENCY: FREQUENCY: INTERMITTENT

## 2020-03-26 NOTE — PLAN OF CARE
Problem: Inadequate oral food/beverage intake (NI-2.1)  Goal: Food and/or Nutrient Delivery  Continue Diet and Ensure [place] ONS BID. Will Start Magic Cup Frozen ONS BID as well to aid in PO intake. Would recommend to consider starting an Appetite Stimulant if medically appropriate. Description: Individualized approach for food/nutrient provision.   Outcome: Met This Shift

## 2020-03-26 NOTE — CONSULTS
See above. CXR: There is some hazy density in the infrahilar regions, improved in comparison with the prior studies, suggesting resolving peribronchial interstitial pneumonitis. No new or progressive pathologic findings are evident. CXR: Developing perihilar airspace disease as noted. This may represent edema or inflammatory infiltrate. CT ABD: Patchy bibasilar infiltrates and small pleural effusion which may be due to pneumonia or edema. Hepatomegaly with  possible liver disease. Consider hepatobiliary scan for evaluation of gallbladder. Consultation for elevated lipase, GIB, and anemia. Currently, pt reports to feeling tired. Last BM yesterday semi-soft. Had small amount of breakfast this AM. Denies any nausea, vomiting, or abdominal pain. Labs today CO2 21, mag 1.4, , calcium 8.2, protein 6.1, albumin 2.4, RBC 2.61, H&H 7.2 & 23.3, MCHC 30.9, MPV 14.0, RDW 16.7, plat 63, lymph 14.8%, mono 20.9%, abso mono 2.29, abso eosi 0.00.      Past Medical History:        Diagnosis Date    Hypertension      Past Surgical History:        Procedure Laterality Date    BACK SURGERY      CARPAL TUNNEL RELEASE Bilateral     EYE SURGERY      HERNIA REPAIR      JOINT REPLACEMENT Bilateral     hips     Current Medications:    Current Facility-Administered Medications: pantoprazole (PROTONIX) injection 40 mg, 40 mg, Intravenous, BID **AND** sodium chloride (PF) 0.9 % injection 10 mL, 10 mL, Intravenous, BID  amoxicillin-clavulanate (AUGMENTIN) 875-125 MG per tablet 1 tablet, 1 tablet, Oral, 2 times per day  lisinopril (PRINIVIL;ZESTRIL) tablet 10 mg, 10 mg, Oral, Daily  ferrous sulfate (IRON 325) tablet 325 mg, 325 mg, Oral, BID WC  ipratropium-albuterol (DUONEB) nebulizer solution 1 ampule, 1 ampule, Inhalation, Q4H  doxycycline hyclate (VIBRAMYCIN) capsule 100 mg, 100 mg, Oral, 2 times per day  docusate sodium (COLACE) capsule 100 mg, 100 mg, Oral, BID PRN  latanoprost (XALATAN) 0.005 % ophthalmic solution 1 drop, 1 patient's size or 3. Use of iterative reconstruction. INDICATION:  elevated lipase, fever elevated lipase, fever COMPARISON: None FINDINGS: The lung bases demonstrate cardiomegaly. There is patchy bibasilar infiltrates and small pleural effusions. There is hepatomegaly with liver measuring 19 cm with  diffuse low attenuation which may be due to liver disease. Gallbladder is partially contracted with a mild wall thickening. Consider hepatobiliary scan. The spleen, pancreas, the adrenals and the kidneys are normal with bilateral renal cyst, the largest one in the left kidney measuring 5 x 6 cm. There is diffuse degenerative changes in the lumbar spine. Pelvis. The bladder is distended. There is constipation. The appendix is not clearly identified. Patchy bibasilar infiltrates and small pleural effusion which may be due to pneumonia or edema. Hepatomegaly with  possible liver disease. Consider hepatobiliary scan for evaluation of gallbladder. Xr Chest Portable    Result Date: 3/21/2020  Patient MRN: [id number] : 3/10/1931 Age:  80 years Gender: Male Order Date: 3/21/2020 10:30 AM Exam: XR CHEST PORTABLE Number of Images: 1 view Indication:  Shortness of breath Comparison: CT chest without contrast 2020, anterior upright chest 2020 Findings: The lungs are symmetrically expanded, and show diminished prominence of the infrahilar density noted on the prior studies, consistent with incomplete response to treatment. No new or progressive pathologic findings are identified. Cardiovascular shadows are normal in appearance. There is intimal aortic calcification and aortic tortuosity. There is no evidence of cardiac enlargement or decompensation. Skeletal structures show no evidence of acute pathology. Degenerative spinal findings an overlying EKG leads and oxygen tubing are present. Dense arterial calcification is noted in the right subclavian distribution.      There is some hazy density in the infrahilar regions, improved in comparison with the prior studies, suggesting resolving peribronchial interstitial pneumonitis. No new or progressive pathologic findings are evident. Xr Chest Portable    Result Date: 3/20/2020  Patient MRN: [id number] : 3/10/1931 Age:  80 years Gender: Male Order Date: 3/20/2020 6:45 AM Exam: XR CHEST PORTABLE Number of Images: 1 view Indication:   cough, fever cough, fever Comparison: 2019 FINDINGS: Stable cardiomediastinal silhouette. The thoracic aorta is tortuous. There is hazy opacity abutting the left pulmonary hilum and perhaps to a somewhat lesser extent abutting the inferior margin of the right pulmonary hilum suggesting developing perihilar airspace disease. The pulmonary vascularity is not clearly congested. Neither costophrenic angle appears blunted. Developing perihilar airspace disease as noted. This may represent edema or inflammatory infiltrate. IMPRESSION:    · Anemia, normocytic   · Elevated lipase 486  · Hepatomegaly  · Liver fibrosis   · ETOH use/ abuse  · Thrombocytopenia  · Pneumonia- defer  · Fatigue  · Early satiety  · DNR-CCA  · Fevers     RECOMMENDATIONS:      · Amylase, lipase today  · PT/INR today & daily  · Occult stool x1- document in progress notes  · Bleeding scan today  · Diet per speech recommendations, as tolerated  · Antibiotic management per ID service  · Continue Protonix as ordered  · Continue FE supplementation  · Medical management per Primary Care  · Continue to medicate for nausea as ordered  · NO EGD planned at this time d/t circumstances, will monitor H&H  · Tumor markers today  · Serial H&H, transfuse <7 per Primary Care  · Liver serology today  · Continue to trend labs  · Will follow    Thank you very much for your consultation. We will follow closely with you. Discussed with Dr. Viraj Rossi developed by Dr. Camelia Coles, NP-C 3/26/2020 9:00 AM for Dr. Reyes Shaw.

## 2020-03-26 NOTE — PROGRESS NOTES
lean, able to achieve fair minus/poor plus with [employer] Sitting: CGA  Standing: Min A     Activity Tolerance Poor plus  2L O2 restin%  With activity: 90% Fair- standing ascencion x6-7 min with fair plus balance during self care tasks             B UE were WFL during tasks. Comments: Upon arrival pt was supine in bed. At end of session pt was transferred to chair with alarm on, all lines and tubes intact and call light within reach. Treatment: Extended time and rest breaks required during functional mobility and ADLs. Pt limited by B hip and LE pain. RN aware. Education: Safety training during ADLs and benefits of performing ADLs OOB verses supine in bed. · Pt has made good progress towards set goals.    · Continue with current plan of care      Treatment Charges: Mins Units   Ther Ex  43778     Manual Therapy 72613     Thera Activities 44052 10 1   ADL/Home Mgt [id number] 15 1   Neuro Re-ed [id number]     Group Therapy      Orthotic manage/training  26125     Non-Billable Time     Total Timed Treatment 25 2       1455 Christin ALVAREZ/L [id number]

## 2020-03-26 NOTE — PROCEDURES
to the presence of mild generalized slowing. No lateralizing or epileptiform features are noted. No electrographic or electroclinical seizures are recorded. CLINICAL INTERPRETATION:      This is a abnormal EEG due to the presence of mild generalized slowing. Findings of this study indicate a generalized encephalopathic process that is nonspecific in type. Toxic, metabolic, or structural abnormalities may be considered. Further clinical correlation is advised.     Isi Traylor MD  Neurology & Clinical Neurophysiology

## 2020-03-26 NOTE — PROGRESS NOTES
P Quality Flow/Interdisciplinary Rounds Progress Note        Quality Flow Rounds held on March 26, 2020    Disciplines Attending:  Bedside Nurse, ,  and Nursing Unit Leadership    Socorro Curtis was admitted on 3/20/2020  6:24 AM    Anticipated Discharge Date:  Expected Discharge Date: 03/31/20    Disposition:    Boby Score:  Boby Scale Score: 18    Readmission Risk              Risk of Unplanned Readmission:        16           Discussed patient goal for the day, patient clinical progression, and barriers to discharge.   The following Goal(s) of the Day/Commitment(s) have been identified:  GI to see      Monica Austin  March 26, 2020

## 2020-03-26 NOTE — PROGRESS NOTES
aspiration. Regular diet for now. Chest x-ray with opacities in the lower lungs bilaterally. Blood and sputum cultures negative, respiratory array negative.     -ID input appreciated, IV antibiotics over to Augmentin and doxycycline can discharge with another 7 days.     2. Acute kidney 3-resolved, IV fluids. Stop at discharge     3.  Elevated troponin -no cardiac symptoms. Also in the setting of elevated creatinine  Creatinine 1.0 now improved.     4. Anemia -hemoglobin 7 range to 5.8 yesterday  - got PRBC post RRT, improved from 5.8-7.2 today. -Iron deficiency anemia -continue iron supplementation  - consult GI for GIB  -Repeat H&H today and lipase today     5.  Elevated lipase -trend up from 200s to 600 to 500  - consult to GI     6.  Pancytopenia, white blood cell count 4.1, hemoglobin 7.6, platelets 59  -Looking back several months ago also noted.     7. Hypertension, continue with lisinopril 10 mg daily. 8.  Hypomagnesia at 1.4 today. Will give a gram of magnesium. 9.  Status post rapid response team yesterday evening. Patient was getting up to the wheelchair was noted to have a vagal response versus seizure. -CT of the head with no acute finding, consulted neurology for seizure.  -Continue neurochecks today.    -Following up on labs d-dimer 1500 will discuss, review ABGs from yesterday. We will repeat d-dimer today      Continue normal saline at 75 an hour. Regular diet at this point-encouraged intake  I updated wife            NOTE: This report was transcribed using voice recognition software. Every effort was made to ensure accuracy; however, inadvertent computerized transcription errors may be present.   Electronically signed by DARRYL Daigle - CNS on 3/26/2020 at 8:05 AM

## 2020-03-26 NOTE — PROGRESS NOTES
and concurred with these findings. FL MODIFIED BARIUM SWALLOW W VIDEO   Final Result   Study is remarkable for transient laryngeal penetration   with thin consistency. This procedure was performed and dictated by Abbi Bolden PA-C with   indirect supervision, and Juanjose Gray. [facility] MD reviewed and concurred with   the findings. XR CHEST PORTABLE   Final Result   There is some hazy density in the infrahilar regions, improved in   comparison with the prior studies, suggesting resolving peribronchial   interstitial pneumonitis. No new or progressive pathologic findings are evident. CT CHEST WO CONTRAST   Final Result   Opacities at both lung bases. Suspect a likely combination of   atelectasis and infiltrate, especially on the left. Trace left pleural   effusion. See above. CT ABDOMEN PELVIS W IV CONTRAST Additional Contrast? None   Final Result   Patchy bibasilar infiltrates and small pleural effusion which may be   due to pneumonia or edema. Hepatomegaly with  possible liver disease. Consider hepatobiliary scan   for evaluation of gallbladder. XR CHEST PORTABLE   Final Result   Developing perihilar airspace disease as noted. This may represent   edema or inflammatory infiltrate. MRI BRAIN WO CONTRAST    (Results Pending)     Assessment:  1. Right basilar infiltrates, left greater than right with an elevated procalcitonin. Aspiration is strongly suspected. Covid 19 is thought to be less likely given the patient's lack of an exposure history and the level of procalcitonin. 2. New onset seizure: Question etiology.     Plan:  1. Antibiotics per infectious disease  2. Await neurology opinion           Care reviewed with the staff and the patient's family as available. Please note that voice recognition technology was used in the preparation of this note and make therefore it may contain inadvertent transcription errors.         Annemarie Horowitz

## 2020-03-26 NOTE — PROGRESS NOTES
Physical Therapy  Facility/Department: [facility] MED SURG  Daily Treatment Note  NAME: Iva Moses  : 3/10/1931  MRN: [id number]    Date of Service: 3/26/2020      Patient Diagnosis(es): The primary encounter diagnosis was Pneumonia due to organism. Diagnoses of VALERIE (acute kidney injury) ([facility] [address] Elevated troponin, Thrombocytopenia ([facility] [address] and Anemia, unspecified type were also pertinent to this visit. has a past medical history of Hypertension. has a past surgical history that includes hernia repair; joint replacement (Bilateral); Carpal tunnel release (Bilateral); eye surgery; and back surgery.        Room #: 546  DIAGNOSIS: pneumonia  PRECAUTIONS: falls, safety, 02  [facility] Score: 15/24    Patient is agreeable to treatment. Pain level is: right hip pain increasing with mobility. Balance: sitting EOB SBA, standing with w/w Min A     Functional Mobility  Bed Mobility  Rolling: Max A  Supine to sit: Max A  Sit to supine: NT  Scooting: Max A to sitting EOB     Transfers  Sit to stand: Min A  Stand to sit: Min A  Stand pivot: NT     Locomotion  Ambulation: 15 feet x 1 reps with Min A with w/w. Pt ambulates with slow bait speed and short shuffled steps. Mildly unsteady but no LOB. Patient education  Pt educated on PT objectives during treatment session, hand placement during transfers, posture while standing. Patient response to education:   Pt verbalized understanding Pt demonstrated skill Pt requires further education in this area   Yes  yes yes     ASSESSMENT:    Comments:  Pt found in bed and left sitting up in the chair with call light in reach and OT staff present. Treatment:  Patient practiced and was instructed in the following treatment:    Functional mobility performed as documented above. Pt limited ambulation distance due to right hip pain. Pt assisted with personal hygiene while standing. PLAN:    Patient is making fair progress towards established goals.   Will continue with current POC.      Time in  1105  Time out  1115    Total Treatment Time  10 minutes     CPT codes:    [x] Therapeutic activities 55014 10minutes  [] Therapeutic exercises 37137  minutes      Claudeen Rack, PT [id number]  License Number: [id number]

## 2020-03-26 NOTE — PROGRESS NOTES
Or  acetaminophen (TYLENOL) suppository 650 mg, Q6H PRN  polyethylene glycol (GLYCOLAX) packet 17 g, Daily PRN  promethazine (PHENERGAN) tablet 12.5 mg, Q6H PRN    Or  ondansetron (ZOFRAN) injection 4 mg, Q6H PRN  enoxaparin (LOVENOX) injection 30 mg, Daily         Data Review  CBC:   Lab Results   Component Value Date    WBC 10.9 2020    RBC 2.61 2020    HGB 7.2 2020    HCT 23.3 2020    MCV 89.3 2020    MCH 27.6 2020    MCHC 30.9 2020    RDW 16.7 2020    PLT 63 2020    MPV 14.0 2020     CMP:    Lab Results   Component Value Date     2020    K 4.0 2020    K 3.5 2020     2020    CO2 21 2020    BUN 20 2020    CREATININE 1.1 2020    GFRAA >60 2020    LABGLOM >60 2020    GLUCOSE 119 2020    PROT 6.1 2020    LABALBU 2.4 2020    CALCIUM 8.2 2020    BILITOT 0.7 2020    ALKPHOS 52 2020    AST 38 2020    ALT 21 2020     Hepatic Function Panel:    Lab Results   Component Value Date    ALKPHOS 52 2020    ALT 21 2020    AST 38 2020    PROT 6.1 2020    BILITOT 0.7 2020    LABALBU 2.4 2020       PT/INR:    Lab Results   Component Value Date    PROTIME 12.5 2019    INR 1.1 2019     IRON:    Lab Results   Component Value Date    IRON 16 2020     Iron Saturation:  No components found for: PERCENTFE  FERRITIN:    Lab Results   Component Value Date    FERRITIN 1,052 2020     Nm Gi Blood Loss    Result Date: 3/26/2020  Patient MRN: [id number] : 3/10/1931 Age:  80 years Gender: Male Order Date: 3/26/2020 11:15 AM Exam: NM GI BLOOD LOSS Number of Images: 5 views Indication: Anemia and one documented episode of lower GI bleed. Comparison: None. TECHNIQUE: Intravenous injection of 23.3 mCi technetium 99 RBC. Flow and Sequential imaging of the abdomen and pelvis performed. Findings:  On flow imaging there is no

## 2020-03-27 ENCOUNTER — APPOINTMENT (OUTPATIENT)
Dept: GENERAL RADIOLOGY | Age: 85
DRG: 177 | End: 2020-03-27
Payer: MEDICARE

## 2020-03-27 ENCOUNTER — APPOINTMENT (OUTPATIENT)
Dept: CT IMAGING | Age: 85
DRG: 177 | End: 2020-03-27
Payer: MEDICARE

## 2020-03-27 LAB
ALBUMIN SERPL-MCNC: 2.7 G/DL (ref 3.5–5.2)
ALP BLD-CCNC: 60 U/L (ref 40–129)
ALT SERPL-CCNC: 18 U/L (ref 0–40)
AMMONIA: 10 UMOL/L (ref 16–60)
AMYLASE: 242 U/L (ref 20–100)
ANION GAP SERPL CALCULATED.3IONS-SCNC: 16 MMOL/L (ref 7–16)
ANISOCYTOSIS: ABNORMAL
ANTI-MITOCHONDRIAL AB, IFA: NEGATIVE
ANTI-NUCLEAR ANTIBODY (ANA): NEGATIVE
AST SERPL-CCNC: 37 U/L (ref 0–39)
BASOPHILS ABSOLUTE: 0 E9/L (ref 0–0.2)
BASOPHILS RELATIVE PERCENT: 0 % (ref 0–2)
BILIRUB SERPL-MCNC: 0.6 MG/DL (ref 0–1.2)
BLASTS RELATIVE PERCENT: 0.9 % (ref 0–0)
BLOOD BANK DISPENSE STATUS: NORMAL
BLOOD BANK PRODUCT CODE: NORMAL
BPU ID: NORMAL
BUN BLDV-MCNC: 21 MG/DL (ref 8–23)
CALCIUM SERPL-MCNC: 8.6 MG/DL (ref 8.6–10.2)
CHLORIDE BLD-SCNC: 103 MMOL/L (ref 98–107)
CHOLESTEROL, FASTING: 85 MG/DL (ref 0–199)
CO2: 21 MMOL/L (ref 22–29)
CREAT SERPL-MCNC: 1.2 MG/DL (ref 0.7–1.2)
DESCRIPTION BLOOD BANK: NORMAL
EOSINOPHILS ABSOLUTE: 0 E9/L (ref 0.05–0.5)
EOSINOPHILS RELATIVE PERCENT: 0 % (ref 0–6)
GFR AFRICAN AMERICAN: >60
GFR NON-AFRICAN AMERICAN: 57 ML/MIN/1.73
GLUCOSE BLD-MCNC: 118 MG/DL (ref 74–99)
HAV IGM SER IA-ACNC: NORMAL
HBA1C MFR BLD: 5.5 % (ref 4–5.6)
HCT VFR BLD CALC: 21.3 % (ref 37–54)
HCT VFR BLD CALC: 22.2 % (ref 37–54)
HDLC SERPL-MCNC: 39 MG/DL
HEMOGLOBIN: 6.7 G/DL (ref 12.5–16.5)
HEMOGLOBIN: 7.1 G/DL (ref 12.5–16.5)
HEPATITIS B CORE IGM ANTIBODY: NORMAL
HEPATITIS B SURFACE ANTIGEN INTERPRETATION: NORMAL
HEPATITIS C ANTIBODY INTERPRETATION: NORMAL
INR BLD: 1.2
LDL CHOLESTEROL CALCULATED: 29 MG/DL (ref 0–99)
LIPASE: 285 U/L (ref 13–60)
LV EF: 55 %
LVEF MODALITY: NORMAL
LYMPHOCYTES ABSOLUTE: 0.7 E9/L (ref 1.5–4)
LYMPHOCYTES RELATIVE PERCENT: 4.4 % (ref 20–42)
MAGNESIUM: 1.7 MG/DL (ref 1.6–2.6)
MCH RBC QN AUTO: 28.9 PG (ref 26–35)
MCHC RBC AUTO-ENTMCNC: 31.5 % (ref 32–34.5)
MCV RBC AUTO: 91.8 FL (ref 80–99.9)
METAMYELOCYTES RELATIVE PERCENT: 0.4 % (ref 0–1)
MONOCYTES ABSOLUTE: 2.1 E9/L (ref 0.1–0.95)
MONOCYTES RELATIVE PERCENT: 11.8 % (ref 2–12)
MYELOCYTE PERCENT: 1.8 % (ref 0–0)
NEUTROPHILS ABSOLUTE: 14.53 E9/L (ref 1.8–7.3)
NEUTROPHILS RELATIVE PERCENT: 80.3 % (ref 43–80)
NUCLEATED RED BLOOD CELLS: 0.4 /100 WBC
PDW BLD-RTO: 17.2 FL (ref 11.5–15)
PLATELET # BLD: 108 E9/L (ref 130–450)
PMV BLD AUTO: 13.5 FL (ref 7–12)
POIKILOCYTES: ABNORMAL
POLYCHROMASIA: ABNORMAL
POTASSIUM SERPL-SCNC: 4.2 MMOL/L (ref 3.5–5)
PROMYELOCYTES PERCENT: 0.4 % (ref 0–0)
PROTHROMBIN TIME: 13.7 SEC (ref 9.3–12.4)
RBC # BLD: 2.32 E12/L (ref 3.8–5.8)
SCHISTOCYTES: ABNORMAL
SODIUM BLD-SCNC: 140 MMOL/L (ref 132–146)
TOTAL PROTEIN: 6.8 G/DL (ref 6.4–8.3)
TRIGLYCERIDE, FASTING: 83 MG/DL (ref 0–149)
VLDLC SERPL CALC-MCNC: 17 MG/DL
WBC # BLD: 17.5 E9/L (ref 4.5–11.5)

## 2020-03-27 PROCEDURE — 70496 CT ANGIOGRAPHY HEAD: CPT

## 2020-03-27 PROCEDURE — 2580000003 HC RX 258: Performed by: NURSE PRACTITIONER

## 2020-03-27 PROCEDURE — 6370000000 HC RX 637 (ALT 250 FOR IP): Performed by: SPECIALIST

## 2020-03-27 PROCEDURE — 6370000000 HC RX 637 (ALT 250 FOR IP): Performed by: NURSE PRACTITIONER

## 2020-03-27 PROCEDURE — 6370000000 HC RX 637 (ALT 250 FOR IP): Performed by: CLINICAL NURSE SPECIALIST

## 2020-03-27 PROCEDURE — 80061 LIPID PANEL: CPT

## 2020-03-27 PROCEDURE — 6360000002 HC RX W HCPCS: Performed by: INTERNAL MEDICINE

## 2020-03-27 PROCEDURE — 6370000000 HC RX 637 (ALT 250 FOR IP): Performed by: INTERNAL MEDICINE

## 2020-03-27 PROCEDURE — 99232 SBSQ HOSP IP/OBS MODERATE 35: CPT | Performed by: NURSE PRACTITIONER

## 2020-03-27 PROCEDURE — 36415 COLL VENOUS BLD VENIPUNCTURE: CPT

## 2020-03-27 PROCEDURE — 83735 ASSAY OF MAGNESIUM: CPT

## 2020-03-27 PROCEDURE — 82150 ASSAY OF AMYLASE: CPT

## 2020-03-27 PROCEDURE — 83690 ASSAY OF LIPASE: CPT

## 2020-03-27 PROCEDURE — 85025 COMPLETE CBC W/AUTO DIFF WBC: CPT

## 2020-03-27 PROCEDURE — 2060000000 HC ICU INTERMEDIATE R&B

## 2020-03-27 PROCEDURE — 82140 ASSAY OF AMMONIA: CPT

## 2020-03-27 PROCEDURE — 80053 COMPREHEN METABOLIC PANEL: CPT

## 2020-03-27 PROCEDURE — C9113 INJ PANTOPRAZOLE SODIUM, VIA: HCPCS | Performed by: INTERNAL MEDICINE

## 2020-03-27 PROCEDURE — 2580000003 HC RX 258: Performed by: INTERNAL MEDICINE

## 2020-03-27 PROCEDURE — 71045 X-RAY EXAM CHEST 1 VIEW: CPT

## 2020-03-27 PROCEDURE — 92526 ORAL FUNCTION THERAPY: CPT | Performed by: SPEECH-LANGUAGE PATHOLOGIST

## 2020-03-27 PROCEDURE — 83036 HEMOGLOBIN GLYCOSYLATED A1C: CPT

## 2020-03-27 PROCEDURE — 93306 TTE W/DOPPLER COMPLETE: CPT

## 2020-03-27 PROCEDURE — 36430 TRANSFUSION BLD/BLD COMPNT: CPT

## 2020-03-27 PROCEDURE — 6360000004 HC RX CONTRAST MEDICATION: Performed by: RADIOLOGY

## 2020-03-27 PROCEDURE — 70498 CT ANGIOGRAPHY NECK: CPT

## 2020-03-27 PROCEDURE — APPSS30 APP SPLIT SHARED TIME 16-30 MINUTES: Performed by: CLINICAL NURSE SPECIALIST

## 2020-03-27 PROCEDURE — 85018 HEMOGLOBIN: CPT

## 2020-03-27 PROCEDURE — 85610 PROTHROMBIN TIME: CPT

## 2020-03-27 PROCEDURE — 85014 HEMATOCRIT: CPT

## 2020-03-27 PROCEDURE — 94640 AIRWAY INHALATION TREATMENT: CPT

## 2020-03-27 PROCEDURE — 2700000000 HC OXYGEN THERAPY PER DAY

## 2020-03-27 PROCEDURE — 99233 SBSQ HOSP IP/OBS HIGH 50: CPT | Performed by: PSYCHIATRY & NEUROLOGY

## 2020-03-27 RX ORDER — 0.9 % SODIUM CHLORIDE 0.9 %
20 INTRAVENOUS SOLUTION INTRAVENOUS ONCE
Status: COMPLETED | OUTPATIENT
Start: 2020-03-27 | End: 2020-03-27

## 2020-03-27 RX ADMIN — DOCUSATE SODIUM 100 MG: 100 CAPSULE, LIQUID FILLED ORAL at 18:09

## 2020-03-27 RX ADMIN — SODIUM CHLORIDE, PRESERVATIVE FREE 10 ML: 5 INJECTION INTRAVENOUS at 18:35

## 2020-03-27 RX ADMIN — DOXAZOSIN 2 MG: 2 TABLET ORAL at 10:57

## 2020-03-27 RX ADMIN — DOXYCYCLINE HYCLATE 100 MG: 100 CAPSULE ORAL at 21:32

## 2020-03-27 RX ADMIN — SODIUM CHLORIDE 20 ML: 9 INJECTION, SOLUTION INTRAVENOUS at 21:56

## 2020-03-27 RX ADMIN — AMOXICILLIN AND CLAVULANATE POTASSIUM 1 TABLET: 875; 125 TABLET, FILM COATED ORAL at 21:32

## 2020-03-27 RX ADMIN — DOXYCYCLINE HYCLATE 100 MG: 100 CAPSULE ORAL at 10:56

## 2020-03-27 RX ADMIN — PANTOPRAZOLE SODIUM 40 MG: 40 INJECTION, POWDER, FOR SOLUTION INTRAVENOUS at 18:35

## 2020-03-27 RX ADMIN — ACETAMINOPHEN 650 MG: 325 TABLET ORAL at 16:14

## 2020-03-27 RX ADMIN — Medication 10 ML: at 10:58

## 2020-03-27 RX ADMIN — LISINOPRIL 10 MG: 10 TABLET ORAL at 10:57

## 2020-03-27 RX ADMIN — IPRATROPIUM BROMIDE AND ALBUTEROL SULFATE 1 AMPULE: 2.5; .5 SOLUTION RESPIRATORY (INHALATION) at 15:56

## 2020-03-27 RX ADMIN — FERROUS SULFATE TAB 325 MG (65 MG ELEMENTAL FE) 325 MG: 325 (65 FE) TAB at 10:57

## 2020-03-27 RX ADMIN — IOPAMIDOL 80 ML: 755 INJECTION, SOLUTION INTRAVENOUS at 19:39

## 2020-03-27 RX ADMIN — SODIUM CHLORIDE 20 ML: 9 INJECTION, SOLUTION INTRAVENOUS at 09:27

## 2020-03-27 RX ADMIN — IPRATROPIUM BROMIDE AND ALBUTEROL SULFATE 1 AMPULE: 2.5; .5 SOLUTION RESPIRATORY (INHALATION) at 12:18

## 2020-03-27 RX ADMIN — ACETAMINOPHEN 650 MG: 325 TABLET ORAL at 06:21

## 2020-03-27 RX ADMIN — AMOXICILLIN AND CLAVULANATE POTASSIUM 1 TABLET: 875; 125 TABLET, FILM COATED ORAL at 10:57

## 2020-03-27 RX ADMIN — Medication 10 ML: at 21:32

## 2020-03-27 RX ADMIN — IPRATROPIUM BROMIDE AND ALBUTEROL SULFATE 1 AMPULE: 2.5; .5 SOLUTION RESPIRATORY (INHALATION) at 19:54

## 2020-03-27 RX ADMIN — LATANOPROST 1 DROP: 50 SOLUTION OPHTHALMIC at 21:32

## 2020-03-27 RX ADMIN — FERROUS SULFATE TAB 325 MG (65 MG ELEMENTAL FE) 325 MG: 325 (65 FE) TAB at 18:09

## 2020-03-27 RX ADMIN — PANTOPRAZOLE SODIUM 40 MG: 40 INJECTION, POWDER, FOR SOLUTION INTRAVENOUS at 06:21

## 2020-03-27 RX ADMIN — ACETAMINOPHEN 650 MG: 325 TABLET ORAL at 22:14

## 2020-03-27 RX ADMIN — DICLOFENAC SODIUM 2 G: 10 GEL TOPICAL at 22:02

## 2020-03-27 RX ADMIN — IPRATROPIUM BROMIDE AND ALBUTEROL SULFATE 1 AMPULE: 2.5; .5 SOLUTION RESPIRATORY (INHALATION) at 09:20

## 2020-03-27 ASSESSMENT — PAIN DESCRIPTION - LOCATION: LOCATION: KNEE

## 2020-03-27 ASSESSMENT — PAIN DESCRIPTION - FREQUENCY: FREQUENCY: CONTINUOUS

## 2020-03-27 ASSESSMENT — PAIN DESCRIPTION - ORIENTATION: ORIENTATION: RIGHT

## 2020-03-27 ASSESSMENT — PAIN DESCRIPTION - PROGRESSION: CLINICAL_PROGRESSION: NOT CHANGED

## 2020-03-27 ASSESSMENT — PAIN - FUNCTIONAL ASSESSMENT: PAIN_FUNCTIONAL_ASSESSMENT: PREVENTS OR INTERFERES SOME ACTIVE ACTIVITIES AND ADLS

## 2020-03-27 ASSESSMENT — PAIN DESCRIPTION - PAIN TYPE: TYPE: CHRONIC PAIN

## 2020-03-27 ASSESSMENT — PAIN SCALES - WONG BAKER: WONGBAKER_NUMERICALRESPONSE: 0

## 2020-03-27 ASSESSMENT — PAIN SCALES - GENERAL
PAINLEVEL_OUTOF10: 3
PAINLEVEL_OUTOF10: 5
PAINLEVEL_OUTOF10: 0
PAINLEVEL_OUTOF10: 10
PAINLEVEL_OUTOF10: 2
PAINLEVEL_OUTOF10: 3
PAINLEVEL_OUTOF10: 0

## 2020-03-27 ASSESSMENT — PAIN DESCRIPTION - DESCRIPTORS: DESCRIPTORS: ACHING;DISCOMFORT;TENDER

## 2020-03-27 NOTE — PROGRESS NOTES
Occupational Therapy  Patient treatment attempted this PM.  Tx held due to pt receiving blood transfusion at this time. Will attempt at a later time.                                                  Arlen Petersen ALVAREZ/L [id number]

## 2020-03-27 NOTE — PROGRESS NOTES
[address] [place] [address] [facility]  Progress Note    Face to face encounter  CC: resting comfortable in bed. SUBJECTIVE:  Chief Complaint   Patient presents with    Fever    Cough     onset saturday     Patient seen for decreased responsiveness and possible seizure-like activity. Seen by neurology for convulsive syncope versus seizure. Resting in bed- on nasal canula- receiving aerosol treatment. No fevers. Review of systems:  As stated above in the chief complaint, otherwise negative. Medications:  Scheduled Meds:   pantoprazole  40 mg Intravenous BID    And    sodium chloride (PF)  10 mL Intravenous BID    amoxicillin-clavulanate  1 tablet Oral 2 times per day    lisinopril  10 mg Oral Daily    ferrous sulfate  325 mg Oral BID WC    ipratropium-albuterol  1 ampule Inhalation Q4H    doxycycline hyclate  100 mg Oral 2 times per day    latanoprost  1 drop Right Eye Nightly    doxazosin  2 mg Oral Daily    sodium chloride flush  10 mL Intravenous 2 times per day    enoxaparin  30 mg Subcutaneous Daily     Continuous Infusions:    PRN Meds:docusate sodium, sodium chloride flush, acetaminophen **OR** acetaminophen, polyethylene glycol, promethazine **OR** ondansetron    OBJECTIVE:  BP (!) 164/72   Pulse 89   Temp 98.4 °F (36.9 °C) (Oral)   Resp 16   Ht 5' 4\" (1.626 m)   Wt 148 lb (67.1 kg)   SpO2 94%   BMI 25.40 kg/m²   Temp  Av.5 °F (36.9 °C)  Min: 98.2 °F (36.8 °C)  Max: 98.7 °F (37.1 °C)  Constitutional: The patient is lying in bed. Resting - no acute distress- receiving aerosol treatment. Skin: Warm and dry. No rashes were noted. HEENT: Round and reactive pupils. Moist mucous membranes. No ulcerations or thrush. Neck: Supple to movements. Chest: No respiratory distress. Good breath sounds bilaterally. on nasal canula. Cardiovascular: S1 and S2 are rhythmic and regular. No murmurs appreciated. Abdomen: Tender to palpation right lower quadrant.   No

## 2020-03-27 NOTE — PLAN OF CARE
Problem: Falls - Risk of:  Goal: Absence of physical injury  Description: Absence of physical injury  Outcome: Met This Shift     Problem: Skin Integrity:  Goal: Absence of new skin breakdown  Description: Absence of new skin breakdown  Outcome: Met This Shift

## 2020-03-27 NOTE — PROGRESS NOTES
be less likely given the patient's lack of an exposure history and the level of procalcitonin. 2. New onset seizure: Question etiology.     Plan:  1. Antibiotics per infectious disease  2. Ramiro chest film as long as the patient is here.           Care reviewed with the staff and the patient's family as available. Please note that voice recognition technology was used in the preparation of this note and make therefore it may contain inadvertent transcription errors. Waleska Douglass M.D., F.C.C.P.     [facility], [address] [place] [address], [address] [place] [address]

## 2020-03-27 NOTE — PROGRESS NOTES
bilaterally, dyspnea on exertion,   Heart: regular rate and rhythm, NSR on telemetry  Extremities: normal, atraumatic, no cyanosis or edema  Pulses: 2+ and symmetric  Skin: color, texture, turgor normal---no rashes or lesions      Mental Status: Alert, oriented x4, thought content appropriate, follows commands well, pleasant    Appropriate attention/concentration  Intact fundus of knowledge  Intact memories    Speech: no dysarthria  Language: no aphasias--- identify objects, repeats phrases, able to tell me how he got here    Cranial Nerves:  I: smell NA   II: visual acuity  NA   II: visual fields Full to confrontation, chronic vision loss   II: pupils PERRL   III,VII: ptosis None   III,IV,VI: extraocular muscles   EOMI without nystagmus   V: mastication Normal   V: facial light touch sensation  Normal   V,VII: corneal reflex     VII: facial muscle function - upper  Normal   VII: facial muscle function - lower Normal   VIII: hearing Normal   IX: soft palate elevation  Normal   IX,X: gag reflex    XI: trapezius strength  5/5   XI: sternocleidomastoid strength 5/5   XI: neck extension strength  5/5   XII: tongue strength  Normal     Motor:  Grossly 5/5 throughout  Normal bulk and tone  No abnormal movements    Sensory:  LT intact throughout    Coordination:   FN, FFM and JUAN intact    Gait:  Not tested due to patient's condition and for safety of patient    DTR:   Deferred    No Young's    No pathological reflexes    Laboratory/Radiology:     CBC:   Lab Results   Component Value Date    WBC 10.9 03/26/2020    RBC 2.61 03/26/2020    HGB 5.8 03/26/2020    HCT 19.0 03/26/2020    MCV 89.3 03/26/2020    MCH 27.6 03/26/2020    MCHC 30.9 03/26/2020    RDW 16.7 03/26/2020    PLT 63 03/26/2020    MPV 14.0 03/26/2020     CMP:    Lab Results   Component Value Date     03/26/2020    K 4.0 03/26/2020    K 3.5 03/21/2020     03/26/2020    CO2 21 03/26/2020    BUN 20 03/26/2020    CREATININE 1.1 03/26/2020    GFRAA >60 pneumonia. The exam has been dictated and signed by Nan Bob. Christina Hawley APRN-DANIELLE   and Josefina Watt [facility] MD, reviewed and concurred with these findings. FL MODIFIED BARIUM SWALLOW W VIDEO   Final Result   Study is remarkable for transient laryngeal penetration   with thin consistency. This procedure was performed and dictated by Krzysztof Gonzalez PA-C with   indirect supervision, and Salome Tapia. RUSSELL [facility] MD reviewed and concurred with   the findings. XR CHEST PORTABLE   Final Result   There is some hazy density in the infrahilar regions, improved in   comparison with the prior studies, suggesting resolving peribronchial   interstitial pneumonitis. No new or progressive pathologic findings are evident. CT CHEST WO CONTRAST   Final Result   Opacities at both lung bases. Suspect a likely combination of   atelectasis and infiltrate, especially on the left. Trace left pleural   effusion. See above. CT ABDOMEN PELVIS W IV CONTRAST Additional Contrast? None   Final Result   Patchy bibasilar infiltrates and small pleural effusion which may be   due to pneumonia or edema. Hepatomegaly with  possible liver disease. Consider hepatobiliary scan   for evaluation of gallbladder. XR CHEST PORTABLE   Final Result   Developing perihilar airspace disease as noted. This may represent   edema or inflammatory infiltrate. All labs and imaging reviewed independently today. Assessment:     Convulsive syncope versus seizure in the setting of community-acquired pneumonia and anemia. CT head no acute abnormality   MRI brain shows 2 very small acute to early subacute infarcts in the right periventricular white matter   EEG completed 3/25--- report to follow   CTA head and neck ordered in addition to complete echo.     Severe anemia   5.8 prior to transfusion--> 7.2 after transfusion and then back down to 5.8   Monitor H/H and transfuse per primary--patient receiving blood currently   On Protonix BID with GI following    Currently patient is alert and oriented x4, able to follow commands, receiving blood with no other complaints except productive cough. Plan:     Continue supportive care  A1c, fasting lipid panel ordered  CTA head and neck ordered  Complete echo ordered  Will consider statin therapy once lipid panel is resulted  Start aspirin once okay with GI--- GI bleed suspected  Neurochecks every 4 hours  PT/OT/ST as able  SCDs  Stroke education    DARRYL Pierre NP-C   8:48 AM  3/27/2020         I have independently evaluated and examined the patient and agree with the assessment and plan of the JANEEN.     Assesment    Punctate right periventricular stroke  Acute Blood loss anemia  Suspected GI bleed  Convulsive syncope    Plan    Stroke work up  EEG: P  Consider adding asa once GI bleed resolves  Currentely he is being transfused 2 units  GI onboard    Zhou Rocha MD  Neurology & Clinical Neurophysiology

## 2020-03-28 LAB
AFP-TUMOR MARKER: 2 NG/ML (ref 0–9)
ALBUMIN SERPL-MCNC: 2.3 G/DL (ref 3.5–5.2)
ALP BLD-CCNC: 68 U/L (ref 40–129)
ALT SERPL-CCNC: 15 U/L (ref 0–40)
AMMONIA: 12.7 UMOL/L (ref 16–60)
AMYLASE: 200 U/L (ref 20–100)
ANION GAP SERPL CALCULATED.3IONS-SCNC: 14 MMOL/L (ref 7–16)
ANISOCYTOSIS: ABNORMAL
AST SERPL-CCNC: 33 U/L (ref 0–39)
BASOPHILS ABSOLUTE: 0 E9/L (ref 0–0.2)
BASOPHILS RELATIVE PERCENT: 0 % (ref 0–2)
BILIRUB SERPL-MCNC: 0.5 MG/DL (ref 0–1.2)
BUN BLDV-MCNC: 19 MG/DL (ref 8–23)
CA 19-9: 15 U/ML (ref 0–37)
CALCIUM SERPL-MCNC: 8.3 MG/DL (ref 8.6–10.2)
CHLORIDE BLD-SCNC: 103 MMOL/L (ref 98–107)
CO2: 22 MMOL/L (ref 22–29)
CREAT SERPL-MCNC: 1.2 MG/DL (ref 0.7–1.2)
EOSINOPHILS ABSOLUTE: 0 E9/L (ref 0.05–0.5)
EOSINOPHILS RELATIVE PERCENT: 0 % (ref 0–6)
FOLATE: 5.4 NG/ML (ref 4.8–24.2)
GFR AFRICAN AMERICAN: >60
GFR NON-AFRICAN AMERICAN: 57 ML/MIN/1.73
GLUCOSE BLD-MCNC: 113 MG/DL (ref 74–99)
HCT VFR BLD CALC: 22 % (ref 37–54)
HCT VFR BLD CALC: 22 % (ref 37–54)
HCT VFR BLD CALC: 23.3 % (ref 37–54)
HCT VFR BLD CALC: 23.5 % (ref 37–54)
HEMOGLOBIN: 7 G/DL (ref 12.5–16.5)
HEMOGLOBIN: 7.1 G/DL (ref 12.5–16.5)
HEMOGLOBIN: 7.5 G/DL (ref 12.5–16.5)
HEMOGLOBIN: 7.6 G/DL (ref 12.5–16.5)
HYPOCHROMIA: ABNORMAL
IGG 1: 517 MG/DL (ref 240–1118)
IGG 2: 411 MG/DL (ref 124–549)
IGG 3: 71 MG/DL (ref 21–134)
IGG 4: 22 MG/DL (ref 1–123)
INR BLD: 1.2
LIPASE: 238 U/L (ref 13–60)
LYMPHOCYTES ABSOLUTE: 0.66 E9/L (ref 1.5–4)
LYMPHOCYTES RELATIVE PERCENT: 4 % (ref 20–42)
MAGNESIUM: 1.8 MG/DL (ref 1.6–2.6)
MCH RBC QN AUTO: 29.1 PG (ref 26–35)
MCHC RBC AUTO-ENTMCNC: 32.3 % (ref 32–34.5)
MCV RBC AUTO: 90.2 FL (ref 80–99.9)
MONOCYTES ABSOLUTE: 2.32 E9/L (ref 0.1–0.95)
MONOCYTES RELATIVE PERCENT: 14 % (ref 2–12)
NEUTROPHILS ABSOLUTE: 13.61 E9/L (ref 1.8–7.3)
NEUTROPHILS RELATIVE PERCENT: 82 % (ref 43–80)
PDW BLD-RTO: 16.6 FL (ref 11.5–15)
PLATELET # BLD: 99 E9/L (ref 130–450)
PLATELET CONFIRMATION: NORMAL
PMV BLD AUTO: 12.7 FL (ref 7–12)
POLYCHROMASIA: ABNORMAL
POTASSIUM SERPL-SCNC: 3.3 MMOL/L (ref 3.5–5)
PROTHROMBIN TIME: 13.6 SEC (ref 9.3–12.4)
RBC # BLD: 2.44 E12/L (ref 3.8–5.8)
SODIUM BLD-SCNC: 139 MMOL/L (ref 132–146)
TOTAL PROTEIN: 6.3 G/DL (ref 6.4–8.3)
WBC # BLD: 16.6 E9/L (ref 4.5–11.5)

## 2020-03-28 PROCEDURE — 85610 PROTHROMBIN TIME: CPT

## 2020-03-28 PROCEDURE — 82747 ASSAY OF FOLIC ACID RBC: CPT

## 2020-03-28 PROCEDURE — 82150 ASSAY OF AMYLASE: CPT

## 2020-03-28 PROCEDURE — 85018 HEMOGLOBIN: CPT

## 2020-03-28 PROCEDURE — 85025 COMPLETE CBC W/AUTO DIFF WBC: CPT

## 2020-03-28 PROCEDURE — 94640 AIRWAY INHALATION TREATMENT: CPT

## 2020-03-28 PROCEDURE — 83690 ASSAY OF LIPASE: CPT

## 2020-03-28 PROCEDURE — 6370000000 HC RX 637 (ALT 250 FOR IP): Performed by: SPECIALIST

## 2020-03-28 PROCEDURE — 2700000000 HC OXYGEN THERAPY PER DAY

## 2020-03-28 PROCEDURE — 80053 COMPREHEN METABOLIC PANEL: CPT

## 2020-03-28 PROCEDURE — 95812 EEG 41-60 MINUTES: CPT | Performed by: PSYCHIATRY & NEUROLOGY

## 2020-03-28 PROCEDURE — 36415 COLL VENOUS BLD VENIPUNCTURE: CPT

## 2020-03-28 PROCEDURE — 83735 ASSAY OF MAGNESIUM: CPT

## 2020-03-28 PROCEDURE — 6370000000 HC RX 637 (ALT 250 FOR IP): Performed by: INTERNAL MEDICINE

## 2020-03-28 PROCEDURE — 2580000003 HC RX 258: Performed by: NURSE PRACTITIONER

## 2020-03-28 PROCEDURE — 99232 SBSQ HOSP IP/OBS MODERATE 35: CPT | Performed by: NURSE PRACTITIONER

## 2020-03-28 PROCEDURE — APPSS30 APP SPLIT SHARED TIME 16-30 MINUTES: Performed by: CLINICAL NURSE SPECIALIST

## 2020-03-28 PROCEDURE — 6370000000 HC RX 637 (ALT 250 FOR IP): Performed by: CLINICAL NURSE SPECIALIST

## 2020-03-28 PROCEDURE — 6370000000 HC RX 637 (ALT 250 FOR IP): Performed by: NURSE PRACTITIONER

## 2020-03-28 PROCEDURE — 2060000000 HC ICU INTERMEDIATE R&B

## 2020-03-28 PROCEDURE — 99233 SBSQ HOSP IP/OBS HIGH 50: CPT | Performed by: INTERNAL MEDICINE

## 2020-03-28 PROCEDURE — 82140 ASSAY OF AMMONIA: CPT

## 2020-03-28 PROCEDURE — 85014 HEMATOCRIT: CPT

## 2020-03-28 PROCEDURE — 2580000003 HC RX 258: Performed by: INTERNAL MEDICINE

## 2020-03-28 PROCEDURE — 6360000002 HC RX W HCPCS: Performed by: INTERNAL MEDICINE

## 2020-03-28 PROCEDURE — 82746 ASSAY OF FOLIC ACID SERUM: CPT

## 2020-03-28 PROCEDURE — C9113 INJ PANTOPRAZOLE SODIUM, VIA: HCPCS | Performed by: INTERNAL MEDICINE

## 2020-03-28 RX ORDER — POTASSIUM CHLORIDE 20 MEQ/1
20 TABLET, EXTENDED RELEASE ORAL 2 TIMES DAILY WITH MEALS
Status: DISCONTINUED | OUTPATIENT
Start: 2020-03-28 | End: 2020-03-29

## 2020-03-28 RX ORDER — SENNA PLUS 8.6 MG/1
1 TABLET ORAL NIGHTLY
Status: DISCONTINUED | OUTPATIENT
Start: 2020-03-28 | End: 2020-03-31 | Stop reason: HOSPADM

## 2020-03-28 RX ORDER — DOCUSATE SODIUM 100 MG/1
100 CAPSULE, LIQUID FILLED ORAL DAILY
Status: DISCONTINUED | OUTPATIENT
Start: 2020-03-28 | End: 2020-03-31 | Stop reason: HOSPADM

## 2020-03-28 RX ADMIN — SENNOSIDES 8.6 MG: 8.6 TABLET, FILM COATED ORAL at 19:58

## 2020-03-28 RX ADMIN — IPRATROPIUM BROMIDE AND ALBUTEROL SULFATE 1 AMPULE: 2.5; .5 SOLUTION RESPIRATORY (INHALATION) at 00:30

## 2020-03-28 RX ADMIN — DOXYCYCLINE HYCLATE 100 MG: 100 CAPSULE ORAL at 19:58

## 2020-03-28 RX ADMIN — DOCUSATE SODIUM 100 MG: 100 CAPSULE, LIQUID FILLED ORAL at 11:07

## 2020-03-28 RX ADMIN — IPRATROPIUM BROMIDE AND ALBUTEROL SULFATE 1 AMPULE: 2.5; .5 SOLUTION RESPIRATORY (INHALATION) at 16:02

## 2020-03-28 RX ADMIN — IPRATROPIUM BROMIDE AND ALBUTEROL SULFATE 1 AMPULE: 2.5; .5 SOLUTION RESPIRATORY (INHALATION) at 04:11

## 2020-03-28 RX ADMIN — AMOXICILLIN AND CLAVULANATE POTASSIUM 1 TABLET: 875; 125 TABLET, FILM COATED ORAL at 19:58

## 2020-03-28 RX ADMIN — LISINOPRIL 10 MG: 10 TABLET ORAL at 09:16

## 2020-03-28 RX ADMIN — FERROUS SULFATE TAB 325 MG (65 MG ELEMENTAL FE) 325 MG: 325 (65 FE) TAB at 09:16

## 2020-03-28 RX ADMIN — Medication 10 ML: at 09:16

## 2020-03-28 RX ADMIN — IPRATROPIUM BROMIDE AND ALBUTEROL SULFATE 1 AMPULE: 2.5; .5 SOLUTION RESPIRATORY (INHALATION) at 23:56

## 2020-03-28 RX ADMIN — AMOXICILLIN AND CLAVULANATE POTASSIUM 1 TABLET: 875; 125 TABLET, FILM COATED ORAL at 09:16

## 2020-03-28 RX ADMIN — POTASSIUM CHLORIDE 20 MEQ: 1500 TABLET, EXTENDED RELEASE ORAL at 16:17

## 2020-03-28 RX ADMIN — DICLOFENAC SODIUM 2 G: 10 GEL TOPICAL at 17:24

## 2020-03-28 RX ADMIN — ACETAMINOPHEN 650 MG: 325 TABLET ORAL at 17:22

## 2020-03-28 RX ADMIN — DICLOFENAC SODIUM 2 G: 10 GEL TOPICAL at 09:16

## 2020-03-28 RX ADMIN — DOXAZOSIN 2 MG: 2 TABLET ORAL at 09:16

## 2020-03-28 RX ADMIN — DOXYCYCLINE HYCLATE 100 MG: 100 CAPSULE ORAL at 09:16

## 2020-03-28 RX ADMIN — ACETAMINOPHEN 650 MG: 325 TABLET ORAL at 09:16

## 2020-03-28 RX ADMIN — SODIUM CHLORIDE, PRESERVATIVE FREE 10 ML: 5 INJECTION INTRAVENOUS at 19:59

## 2020-03-28 RX ADMIN — Medication 10 ML: at 19:59

## 2020-03-28 RX ADMIN — PANTOPRAZOLE SODIUM 40 MG: 40 INJECTION, POWDER, FOR SOLUTION INTRAVENOUS at 19:58

## 2020-03-28 RX ADMIN — IPRATROPIUM BROMIDE AND ALBUTEROL SULFATE 1 AMPULE: 2.5; .5 SOLUTION RESPIRATORY (INHALATION) at 08:49

## 2020-03-28 RX ADMIN — SODIUM CHLORIDE, PRESERVATIVE FREE 10 ML: 5 INJECTION INTRAVENOUS at 06:10

## 2020-03-28 RX ADMIN — POTASSIUM CHLORIDE 20 MEQ: 1500 TABLET, EXTENDED RELEASE ORAL at 11:06

## 2020-03-28 RX ADMIN — IPRATROPIUM BROMIDE AND ALBUTEROL SULFATE 1 AMPULE: 2.5; .5 SOLUTION RESPIRATORY (INHALATION) at 20:28

## 2020-03-28 RX ADMIN — LATANOPROST 1 DROP: 50 SOLUTION OPHTHALMIC at 19:58

## 2020-03-28 RX ADMIN — PANTOPRAZOLE SODIUM 40 MG: 40 INJECTION, POWDER, FOR SOLUTION INTRAVENOUS at 06:09

## 2020-03-28 RX ADMIN — IPRATROPIUM BROMIDE AND ALBUTEROL SULFATE 1 AMPULE: 2.5; .5 SOLUTION RESPIRATORY (INHALATION) at 11:44

## 2020-03-28 ASSESSMENT — PAIN SCALES - GENERAL
PAINLEVEL_OUTOF10: 4
PAINLEVEL_OUTOF10: 3
PAINLEVEL_OUTOF10: 0

## 2020-03-28 ASSESSMENT — PAIN DESCRIPTION - PAIN TYPE: TYPE: CHRONIC PAIN

## 2020-03-28 ASSESSMENT — PAIN DESCRIPTION - ORIENTATION: ORIENTATION: RIGHT

## 2020-03-28 ASSESSMENT — PAIN DESCRIPTION - DESCRIPTORS: DESCRIPTORS: ACHING;CONSTANT;DISCOMFORT

## 2020-03-28 ASSESSMENT — PAIN DESCRIPTION - LOCATION: LOCATION: HIP

## 2020-03-28 ASSESSMENT — PAIN - FUNCTIONAL ASSESSMENT: PAIN_FUNCTIONAL_ASSESSMENT: PREVENTS OR INTERFERES SOME ACTIVE ACTIVITIES AND ADLS

## 2020-03-28 ASSESSMENT — PAIN DESCRIPTION - FREQUENCY: FREQUENCY: CONTINUOUS

## 2020-03-28 NOTE — PROGRESS NOTES
Iveth Lares is a 80 y.o. right handed male     Neurology is following for seizure-like activity    PMH: Hypertension, hyperlipidemia, iron deficiency anemia, pancytopenia, GI bleed, former smoker    Patient presented with complaint of shortness of breath and was diagnosed with community-acquired pneumonia initially treated with IV antibiotics there was switched to oral antibiotics with Augmentin and doxycycline. Patient was supposed to be discharged yesterday however when he was getting discharge he began to have seizure-like activity described as as rolled, shaking of bilateral upper and lower extremities with bladder incontinence. RRT was called patient was pale, diaphoretic and weak however still alert and oriented x3 and able to communicate and follow commands. Prior to this event right before occurrence, patient had a large bowel movement and was in severe pain. During this patient felt lightheaded and blacked out before passing out. No tongue biting or other associated symptoms. His hemoglobin was 5.8 at the time of this event. After transfusion hemoglobin 7.2   CT head showed no acute abnormality. MRI brain shows 2 very small acute to early subacute infarcts in the right periventricular white matter   EEG completed 3/25 per Dr. Adian Bowden showed mild general slowing; no seizure activity   CTA head and neck showed no hemorrhage; occlusion of proximal left vertebral artery with small reconstitution in addition to scattered atherosclerotic disease resulting in mild to moderate narrowing the cerebral and neck arterial vasculature. No LVO    Vital signs stable--although intermittently hypertensive and tachycardic    Other than productive cough patient has no other complaints today. Patient is s/p blood transfusion on 3/27. No family available.     Objective:     /63   Pulse 79   Temp 98 °F (36.7 °C) (Oral)   Resp 20   Ht 5' 4\" (1.626 m)   Wt 151 lb 8 oz (68.7 kg)   SpO2 94%   BMI MCV 90.2 03/28/2020    MCH 29.1 03/28/2020    MCHC 32.3 03/28/2020    RDW 16.6 03/28/2020    PLT 99 03/28/2020    MPV 12.7 03/28/2020     CMP:    Lab Results   Component Value Date     03/28/2020    K 3.3 03/28/2020    K 3.5 03/21/2020     03/28/2020    CO2 22 03/28/2020    BUN 19 03/28/2020    CREATININE 1.2 03/28/2020    GFRAA >60 03/28/2020    LABGLOM 57 03/28/2020    GLUCOSE 113 03/28/2020    PROT 6.3 03/28/2020    LABALBU 2.3 03/28/2020    CALCIUM 8.3 03/28/2020    BILITOT 0.5 03/28/2020    ALKPHOS 68 03/28/2020    AST 33 03/28/2020    ALT 15 03/28/2020     Hepatic Function Panel:    Lab Results   Component Value Date    ALKPHOS 68 03/28/2020    ALT 15 03/28/2020    AST 33 03/28/2020    PROT 6.3 03/28/2020    BILITOT 0.5 03/28/2020    LABALBU 2.3 03/28/2020     U/A:    Lab Results   Component Value Date    COLORU Yellow 03/20/2020    PROTEINU 100 03/20/2020    PHUR 5.5 03/20/2020    WBCUA NONE 03/20/2020    RBCUA 5-10 03/20/2020    BACTERIA FEW 03/20/2020    CLARITYU Clear 03/20/2020    SPECGRAV 1.020 03/20/2020    LEUKOCYTESUR Negative 03/20/2020    UROBILINOGEN 0.2 03/20/2020    BILIRUBINUR Negative 03/20/2020    BLOODU MODERATE 03/20/2020    GLUCOSEU Negative 03/20/2020    AMORPHOUS MANY 03/20/2020   FOLATE:    Lab Results   Component Value Date    FOLATE 5.4 03/28/2020     IRON:    Lab Results   Component Value Date    IRON 16 03/21/2020   TIBC:    Lab Results   Component Value Date    TIBC 122 03/21/2020     FERRITIN:    Lab Results   Component Value Date    FERRITIN 1,052 03/20/2020     CTA HEAD W CONTRAST   Final Result   1. No CT evidence of acute intracranial hemorrhage. 2. 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 3. No evidence of large vessel occlusion within the brain.    4. Scattered atherosclerotic disease resulting in mild to moderate   narrowing of the cerebral and neck arterial vasculature. 5. Moderate right and small left pleural effusions. CTA NECK W CONTRAST   Final Result   1. No CT evidence of acute intracranial hemorrhage. 2. 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 3. No evidence of large vessel occlusion within the brain. 4. Scattered atherosclerotic disease resulting in mild to moderate   narrowing of the cerebral and neck arterial vasculature. 5. Moderate right and small left pleural effusions. XR CHEST PORTABLE   Final Result   Interval worsening of dense airspace opacities in the   right lower lung. The study was dictated by Wes Dyer PA-C and Janeen Benson. [facility] MD   reviewed and concurred with the findings. NM GI BLOOD LOSS   Final Result   No evidence of active GI bleed or hypervascular lesion. See above. MRI BRAIN WO CONTRAST   Final Result         1. Two tiny foci of acute to early subacute infarctions in the right   periventricular white matter. 2. No hemorrhage, mass effect or midline shift      CT HEAD WO CONTRAST   Final Result   Atrophy and probable chronic microvascular ischemic disease. XR CHEST STANDARD (2 VW)   Final Result   Opacities which are thought to represent pneumonic infiltrate at the   bases. Stable on the right. Mildly progressive on the left. XR CHEST STANDARD (2 VW)   Final Result   Interval worsening dense airspace opacities in the lower lungs   bilaterally. Concerning for multifocal versus viral pneumonia. The exam has been dictated and signed by Jim Mayer. DARRYL Carpenter-DANIELLE   and Jason Guerra [facility] MD, reviewed and concurred with these findings. FL MODIFIED BARIUM SWALLOW W VIDEO   Final Result   Study is remarkable for transient laryngeal penetration   with thin consistency.        This procedure was performed and dictated by Chidi Lazo PA-C with   indirect supervision, and Adán WELLS [facility] MD reviewed and concurred with   the findings. XR CHEST PORTABLE   Final Result   There is some hazy density in the infrahilar regions, improved in   comparison with the prior studies, suggesting resolving peribronchial   interstitial pneumonitis. No new or progressive pathologic findings are evident. CT CHEST WO CONTRAST   Final Result   Opacities at both lung bases. Suspect a likely combination of   atelectasis and infiltrate, especially on the left. Trace left pleural   effusion. See above. CT ABDOMEN PELVIS W IV CONTRAST Additional Contrast? None   Final Result   Patchy bibasilar infiltrates and small pleural effusion which may be   due to pneumonia or edema. Hepatomegaly with  possible liver disease. Consider hepatobiliary scan   for evaluation of gallbladder. XR CHEST PORTABLE   Final Result   Developing perihilar airspace disease as noted. This may represent   edema or inflammatory infiltrate. XR CHEST PORTABLE    (Results Pending)               Complete echo 3/27/2020:   Normal left ventricular systolic function. Ejection fraction is visually estimated at 55%. Normal size left atrium by volume index. Normal right ventricular size and function (TAPSE 2.4 cm). There is doppler evidence of stage I diastolic dysfunction. Mild mitral regurgitation. All labs and imaging reviewed independently today. Assessment:     Convulsive syncope in the setting of community-acquired pneumonia and anemia.      CT head no acute abnormality   MRI brain shows 2 very small acute to early subacute infarcts in the right periventricular white matter   EEG completed 3/25 showed mild general slowing; no seizure activity   CTA head and neck showed no hemorrhage; occlusion of proximal left vertebral artery with small reconstitution in addition to scattered atherosclerotic disease resulting in

## 2020-03-28 NOTE — PROGRESS NOTES
[address] [place] [address] [facility]  Progress Note    Face to face encounter  CC: resting comfortable in bed. SUBJECTIVE:  Chief Complaint   Patient presents with    Fever    Cough     onset saturday     Patient seen for decreased responsiveness and possible seizure-like activity. Seen by neurology for convulsive syncope versus seizure. Sitting up in chair - on nasal canula- feeling ok today. Wants to go home. No fevers. Review of systems:  As stated above in the chief complaint, otherwise negative. Medications:  Scheduled Meds:   potassium chloride  20 mEq Oral BID WC    docusate sodium  100 mg Oral Daily    senna  1 tablet Oral Nightly    pantoprazole  40 mg Intravenous BID    And    sodium chloride (PF)  10 mL Intravenous BID    amoxicillin-clavulanate  1 tablet Oral 2 times per day    lisinopril  10 mg Oral Daily    ferrous sulfate  325 mg Oral BID WC    ipratropium-albuterol  1 ampule Inhalation Q4H    doxycycline hyclate  100 mg Oral 2 times per day    latanoprost  1 drop Right Eye Nightly    doxazosin  2 mg Oral Daily    sodium chloride flush  10 mL Intravenous 2 times per day    enoxaparin  30 mg Subcutaneous Daily     Continuous Infusions:    PRN Meds:diclofenac sodium, docusate sodium, sodium chloride flush, acetaminophen **OR** acetaminophen, polyethylene glycol, promethazine **OR** ondansetron    OBJECTIVE:  /63   Pulse 79   Temp 98 °F (36.7 °C) (Oral)   Resp 20   Ht 5' 4\" (1.626 m)   Wt 151 lb 8 oz (68.7 kg)   SpO2 94%   BMI 26.00 kg/m²   Temp  Av.3 °F (36.8 °C)  Min: 98 °F (36.7 °C)  Max: 98.6 °F (37 °C)  Constitutional: The patient is up to chair. Feeling ok today-  Skin: Warm and dry. No rashes were noted. HEENT: Round and reactive pupils. Moist mucous membranes. No ulcerations or thrush. Neck: Supple to movements. Chest: No respiratory distress. Scattered upper rhonci- mostly clears with cough. on nasal canula.   Cardiovascular:

## 2020-03-28 NOTE — PROGRESS NOTES
Assessment:    Principal Problem:    Pneumonia due to organism  Active Problems:    Anemia    HTN (hypertension)    Acute kidney injury ([facility] [address]    Acute hyperglycemia    Community acquired pneumonia    Elevated troponin    Aspiration pneumonia ([facility] [address]    Syncope    Acute blood loss anemia    Acute GI bleeding    Acute ischemic stroke [facility])  Resolved Problems:    * No resolved hospital problems. *         Plan:  1. Community-acquired pneumonia versus aspiration pneumonia. Left lower lung pneumonia. -Dysphasia noted on swallow study per SLP note - no aspiration. Regular diet for now. Chest x-ray with opacities in the lower lungs bilaterally. Blood and sputum cultures negative, respiratory array negative.     -ID input appreciated, IV antibiotics over to Augmentin and doxycycline can discharge with another 7 days.  -Recent chest x-ray still with worsening opacity in the right lower lung yesterday.     2. Acute kidney 3-resolved, IV fluids.       3.  Elevated troponin -no cardiac symptoms. Also in the setting of elevated creatinine  Creatinine 1.0 now improved.     4. Anemia -hemoglobin 7 range to 5.8 3/25 and 3/26 and  - got PRBC 3/25/20, 3/26, 3/27--hemoglobin 7 range now for repeat. Likely need blood again  -Iron deficiency anemia -continue iron supplementation  - consult GI for GIB, on IV PPI BID  -Repeat H&H today   -Bleeding scan negative  -Follow with GI-questionable scope  -lovenox not given in 2 days--officially stop     5.  Elevated lipase -trend up from 200s to 600 to 500  - consult to GI     6.  Pancytopenia, white blood cell count 4.1, hemoglobin 7.6, platelets 59  -Looking back several months ago also noted.     7. Hypertension, continue with lisinopril 10 mg daily. 8.  Hypomagnesia at 1.4 today. Will give a gram of magnesium. 9.  Status post rapid response team yesterday evening. Patient was getting up to the wheelchair was noted to have a vagal response versus seizure.   -CT of the head with no acute finding, consulted neurology for seizure.    -Neurology consulted pending EEG, MRI of the brain did show tiny foci of acute subacute infarctions on the right. Otherwise no hemorrhage or mass.    -Follow with neurology for seizure ? -Aspirin once cleared by GI, lipid panel within normal range    10. Elevated d-dimer in the setting of possible GI bleed.  1500. No repeat at this time. 11.  Leukocytosis likely reactive to GI bleed anemia    12. Hypokalemia 3.3 today we will replace. Repeat labs tomorrow and a mag    13. Constipation-add stool softener, patient tells me he is struggled with constipation due to iron in the past not new          Continue normal saline at 75 an hour. Regular diet at this point-encouraged intake, dietary input appreciated, okay to proceed with supplementation.              NOTE: This report was transcribed using voice recognition software. Every effort was made to ensure accuracy; however, inadvertent computerized transcription errors may be present.   Electronically signed by DARRYL Blair on 3/28/2020 at 8:10 AM

## 2020-03-29 ENCOUNTER — APPOINTMENT (OUTPATIENT)
Dept: GENERAL RADIOLOGY | Age: 85
DRG: 177 | End: 2020-03-29
Payer: MEDICARE

## 2020-03-29 LAB
ALBUMIN SERPL-MCNC: 2.5 G/DL (ref 3.5–5.2)
ALBUMIN SERPL-MCNC: 2.6 G/DL (ref 3.5–5.2)
ALP BLD-CCNC: 70 U/L (ref 40–129)
ALP BLD-CCNC: 73 U/L (ref 40–129)
ALT SERPL-CCNC: 14 U/L (ref 0–40)
ALT SERPL-CCNC: 14 U/L (ref 0–40)
AMMONIA: 21.1 UMOL/L (ref 16–60)
AMYLASE: 204 U/L (ref 20–100)
ANION GAP SERPL CALCULATED.3IONS-SCNC: 11 MMOL/L (ref 7–16)
ANION GAP SERPL CALCULATED.3IONS-SCNC: 12 MMOL/L (ref 7–16)
ANISOCYTOSIS: ABNORMAL
AST SERPL-CCNC: 31 U/L (ref 0–39)
AST SERPL-CCNC: 32 U/L (ref 0–39)
BASOPHILS ABSOLUTE: 0.11 E9/L (ref 0–0.2)
BASOPHILS RELATIVE PERCENT: 0.9 % (ref 0–2)
BILIRUB SERPL-MCNC: 0.4 MG/DL (ref 0–1.2)
BILIRUB SERPL-MCNC: 0.4 MG/DL (ref 0–1.2)
BLOOD BANK DISPENSE STATUS: NORMAL
BLOOD BANK DISPENSE STATUS: NORMAL
BLOOD BANK PRODUCT CODE: NORMAL
BLOOD BANK PRODUCT CODE: NORMAL
BPU ID: NORMAL
BPU ID: NORMAL
BUN BLDV-MCNC: 19 MG/DL (ref 8–23)
BUN BLDV-MCNC: 19 MG/DL (ref 8–23)
CALCIUM SERPL-MCNC: 8.6 MG/DL (ref 8.6–10.2)
CALCIUM SERPL-MCNC: 8.6 MG/DL (ref 8.6–10.2)
CHLORIDE BLD-SCNC: 107 MMOL/L (ref 98–107)
CHLORIDE BLD-SCNC: 107 MMOL/L (ref 98–107)
CO2: 21 MMOL/L (ref 22–29)
CO2: 22 MMOL/L (ref 22–29)
CREAT SERPL-MCNC: 1.1 MG/DL (ref 0.7–1.2)
CREAT SERPL-MCNC: 1.1 MG/DL (ref 0.7–1.2)
DESCRIPTION BLOOD BANK: NORMAL
DESCRIPTION BLOOD BANK: NORMAL
EOSINOPHILS ABSOLUTE: 0 E9/L (ref 0.05–0.5)
EOSINOPHILS RELATIVE PERCENT: 0 % (ref 0–6)
GFR AFRICAN AMERICAN: >60
GFR AFRICAN AMERICAN: >60
GFR NON-AFRICAN AMERICAN: >60 ML/MIN/1.73
GFR NON-AFRICAN AMERICAN: >60 ML/MIN/1.73
GLUCOSE BLD-MCNC: 111 MG/DL (ref 74–99)
GLUCOSE BLD-MCNC: 131 MG/DL (ref 74–99)
HCT VFR BLD CALC: 21.8 % (ref 37–54)
HCT VFR BLD CALC: 29.4 % (ref 37–54)
HEMOGLOBIN: 6.8 G/DL (ref 12.5–16.5)
HEMOGLOBIN: 9.4 G/DL (ref 12.5–16.5)
HYPOCHROMIA: ABNORMAL
INR BLD: 1.2
LIPASE: 320 U/L (ref 13–60)
LYMPHOCYTES ABSOLUTE: 1.27 E9/L (ref 1.5–4)
LYMPHOCYTES RELATIVE PERCENT: 10.4 % (ref 20–42)
MAGNESIUM: 1.6 MG/DL (ref 1.6–2.6)
MCH RBC QN AUTO: 28.5 PG (ref 26–35)
MCHC RBC AUTO-ENTMCNC: 31.2 % (ref 32–34.5)
MCV RBC AUTO: 91.2 FL (ref 80–99.9)
MONOCYTES ABSOLUTE: 1.52 E9/L (ref 0.1–0.95)
MONOCYTES RELATIVE PERCENT: 12.2 % (ref 2–12)
NEUTROPHILS ABSOLUTE: 9.78 E9/L (ref 1.8–7.3)
NEUTROPHILS RELATIVE PERCENT: 76.5 % (ref 43–80)
NUCLEATED RED BLOOD CELLS: 0 /100 WBC
PDW BLD-RTO: 17.2 FL (ref 11.5–15)
PLATELET # BLD: 118 E9/L (ref 130–450)
PMV BLD AUTO: 13 FL (ref 7–12)
POLYCHROMASIA: ABNORMAL
POTASSIUM SERPL-SCNC: 3.7 MMOL/L (ref 3.5–5)
POTASSIUM SERPL-SCNC: 3.9 MMOL/L (ref 3.5–5)
PROTHROMBIN TIME: 14 SEC (ref 9.3–12.4)
RBC # BLD: 2.39 E12/L (ref 3.8–5.8)
SODIUM BLD-SCNC: 140 MMOL/L (ref 132–146)
SODIUM BLD-SCNC: 140 MMOL/L (ref 132–146)
TOTAL PROTEIN: 6.6 G/DL (ref 6.4–8.3)
TOTAL PROTEIN: 6.7 G/DL (ref 6.4–8.3)
WBC # BLD: 12.7 E9/L (ref 4.5–11.5)

## 2020-03-29 PROCEDURE — 83690 ASSAY OF LIPASE: CPT

## 2020-03-29 PROCEDURE — 2060000000 HC ICU INTERMEDIATE R&B

## 2020-03-29 PROCEDURE — 80053 COMPREHEN METABOLIC PANEL: CPT

## 2020-03-29 PROCEDURE — 6370000000 HC RX 637 (ALT 250 FOR IP): Performed by: SPECIALIST

## 2020-03-29 PROCEDURE — 85014 HEMATOCRIT: CPT

## 2020-03-29 PROCEDURE — 2580000003 HC RX 258: Performed by: NURSE PRACTITIONER

## 2020-03-29 PROCEDURE — C9113 INJ PANTOPRAZOLE SODIUM, VIA: HCPCS | Performed by: INTERNAL MEDICINE

## 2020-03-29 PROCEDURE — 71045 X-RAY EXAM CHEST 1 VIEW: CPT

## 2020-03-29 PROCEDURE — 99233 SBSQ HOSP IP/OBS HIGH 50: CPT | Performed by: INTERNAL MEDICINE

## 2020-03-29 PROCEDURE — 97110 THERAPEUTIC EXERCISES: CPT

## 2020-03-29 PROCEDURE — 94640 AIRWAY INHALATION TREATMENT: CPT

## 2020-03-29 PROCEDURE — 6370000000 HC RX 637 (ALT 250 FOR IP): Performed by: NURSE PRACTITIONER

## 2020-03-29 PROCEDURE — 82150 ASSAY OF AMYLASE: CPT

## 2020-03-29 PROCEDURE — 36415 COLL VENOUS BLD VENIPUNCTURE: CPT

## 2020-03-29 PROCEDURE — 85018 HEMOGLOBIN: CPT

## 2020-03-29 PROCEDURE — 2580000003 HC RX 258: Performed by: INTERNAL MEDICINE

## 2020-03-29 PROCEDURE — 2700000000 HC OXYGEN THERAPY PER DAY

## 2020-03-29 PROCEDURE — 36430 TRANSFUSION BLD/BLD COMPNT: CPT

## 2020-03-29 PROCEDURE — 6370000000 HC RX 637 (ALT 250 FOR IP): Performed by: CLINICAL NURSE SPECIALIST

## 2020-03-29 PROCEDURE — 94669 MECHANICAL CHEST WALL OSCILL: CPT

## 2020-03-29 PROCEDURE — 6370000000 HC RX 637 (ALT 250 FOR IP): Performed by: INTERNAL MEDICINE

## 2020-03-29 PROCEDURE — 85025 COMPLETE CBC W/AUTO DIFF WBC: CPT

## 2020-03-29 PROCEDURE — APPSS30 APP SPLIT SHARED TIME 16-30 MINUTES: Performed by: CLINICAL NURSE SPECIALIST

## 2020-03-29 PROCEDURE — 85610 PROTHROMBIN TIME: CPT

## 2020-03-29 PROCEDURE — 83735 ASSAY OF MAGNESIUM: CPT

## 2020-03-29 PROCEDURE — 6360000002 HC RX W HCPCS: Performed by: INTERNAL MEDICINE

## 2020-03-29 PROCEDURE — 82140 ASSAY OF AMMONIA: CPT

## 2020-03-29 RX ORDER — 0.9 % SODIUM CHLORIDE 0.9 %
20 INTRAVENOUS SOLUTION INTRAVENOUS ONCE
Status: DISCONTINUED | OUTPATIENT
Start: 2020-03-29 | End: 2020-03-31 | Stop reason: HOSPADM

## 2020-03-29 RX ADMIN — ACETAMINOPHEN 650 MG: 325 TABLET ORAL at 08:17

## 2020-03-29 RX ADMIN — IPRATROPIUM BROMIDE AND ALBUTEROL SULFATE 1 AMPULE: 2.5; .5 SOLUTION RESPIRATORY (INHALATION) at 08:29

## 2020-03-29 RX ADMIN — Medication 10 ML: at 08:18

## 2020-03-29 RX ADMIN — IPRATROPIUM BROMIDE AND ALBUTEROL SULFATE 1 AMPULE: 2.5; .5 SOLUTION RESPIRATORY (INHALATION) at 23:40

## 2020-03-29 RX ADMIN — IPRATROPIUM BROMIDE AND ALBUTEROL SULFATE 1 AMPULE: 2.5; .5 SOLUTION RESPIRATORY (INHALATION) at 15:58

## 2020-03-29 RX ADMIN — LATANOPROST 1 DROP: 50 SOLUTION OPHTHALMIC at 20:20

## 2020-03-29 RX ADMIN — DICLOFENAC SODIUM 2 G: 10 GEL TOPICAL at 00:52

## 2020-03-29 RX ADMIN — IPRATROPIUM BROMIDE AND ALBUTEROL SULFATE 1 AMPULE: 2.5; .5 SOLUTION RESPIRATORY (INHALATION) at 03:47

## 2020-03-29 RX ADMIN — ACETAMINOPHEN 650 MG: 325 TABLET ORAL at 18:15

## 2020-03-29 RX ADMIN — IPRATROPIUM BROMIDE AND ALBUTEROL SULFATE 1 AMPULE: 2.5; .5 SOLUTION RESPIRATORY (INHALATION) at 20:09

## 2020-03-29 RX ADMIN — Medication 10 ML: at 05:37

## 2020-03-29 RX ADMIN — Medication 10 ML: at 20:20

## 2020-03-29 RX ADMIN — FERROUS SULFATE TAB 325 MG (65 MG ELEMENTAL FE) 325 MG: 325 (65 FE) TAB at 08:18

## 2020-03-29 RX ADMIN — SODIUM CHLORIDE, PRESERVATIVE FREE 10 ML: 5 INJECTION INTRAVENOUS at 05:37

## 2020-03-29 RX ADMIN — AMOXICILLIN AND CLAVULANATE POTASSIUM 1 TABLET: 875; 125 TABLET, FILM COATED ORAL at 08:18

## 2020-03-29 RX ADMIN — DOXAZOSIN 2 MG: 2 TABLET ORAL at 08:17

## 2020-03-29 RX ADMIN — SODIUM CHLORIDE, PRESERVATIVE FREE 10 ML: 5 INJECTION INTRAVENOUS at 18:07

## 2020-03-29 RX ADMIN — POTASSIUM CHLORIDE 20 MEQ: 1500 TABLET, EXTENDED RELEASE ORAL at 08:18

## 2020-03-29 RX ADMIN — LISINOPRIL 10 MG: 10 TABLET ORAL at 08:17

## 2020-03-29 RX ADMIN — DOXYCYCLINE HYCLATE 100 MG: 100 CAPSULE ORAL at 20:20

## 2020-03-29 RX ADMIN — ACETAMINOPHEN 650 MG: 325 TABLET ORAL at 00:51

## 2020-03-29 RX ADMIN — AMOXICILLIN AND CLAVULANATE POTASSIUM 1 TABLET: 875; 125 TABLET, FILM COATED ORAL at 20:20

## 2020-03-29 RX ADMIN — PANTOPRAZOLE SODIUM 40 MG: 40 INJECTION, POWDER, FOR SOLUTION INTRAVENOUS at 18:07

## 2020-03-29 RX ADMIN — IPRATROPIUM BROMIDE AND ALBUTEROL SULFATE 1 AMPULE: 2.5; .5 SOLUTION RESPIRATORY (INHALATION) at 11:38

## 2020-03-29 RX ADMIN — PANTOPRAZOLE SODIUM 40 MG: 40 INJECTION, POWDER, FOR SOLUTION INTRAVENOUS at 05:37

## 2020-03-29 RX ADMIN — DOXYCYCLINE HYCLATE 100 MG: 100 CAPSULE ORAL at 08:17

## 2020-03-29 RX ADMIN — FERROUS SULFATE TAB 325 MG (65 MG ELEMENTAL FE) 325 MG: 325 (65 FE) TAB at 18:07

## 2020-03-29 ASSESSMENT — PAIN DESCRIPTION - ORIENTATION
ORIENTATION: RIGHT
ORIENTATION: RIGHT

## 2020-03-29 ASSESSMENT — PAIN DESCRIPTION - LOCATION
LOCATION: HIP
LOCATION: HIP

## 2020-03-29 ASSESSMENT — PAIN DESCRIPTION - PAIN TYPE
TYPE: CHRONIC PAIN
TYPE: CHRONIC PAIN

## 2020-03-29 ASSESSMENT — PAIN DESCRIPTION - PROGRESSION
CLINICAL_PROGRESSION: NOT CHANGED
CLINICAL_PROGRESSION: NOT CHANGED

## 2020-03-29 ASSESSMENT — PAIN SCALES - GENERAL
PAINLEVEL_OUTOF10: 4
PAINLEVEL_OUTOF10: 5
PAINLEVEL_OUTOF10: 8
PAINLEVEL_OUTOF10: 5

## 2020-03-29 ASSESSMENT — PAIN DESCRIPTION - FREQUENCY
FREQUENCY: CONTINUOUS
FREQUENCY: CONTINUOUS

## 2020-03-29 ASSESSMENT — PAIN DESCRIPTION - ONSET
ONSET: ON-GOING
ONSET: ON-GOING

## 2020-03-29 ASSESSMENT — PAIN - FUNCTIONAL ASSESSMENT
PAIN_FUNCTIONAL_ASSESSMENT: PREVENTS OR INTERFERES SOME ACTIVE ACTIVITIES AND ADLS
PAIN_FUNCTIONAL_ASSESSMENT: PREVENTS OR INTERFERES SOME ACTIVE ACTIVITIES AND ADLS

## 2020-03-29 ASSESSMENT — PAIN DESCRIPTION - DESCRIPTORS
DESCRIPTORS: ACHING;CONSTANT
DESCRIPTORS: ACHING

## 2020-03-29 NOTE — CARE COORDINATION
Received call from [facility]- they are now unable to accept @ [facility]- they can accept to a private room @ [address] [facility] did notify wife and she is agreeable to [facility]- facility will continue to monitor his respiratory status and cxr results prior to to discharge

## 2020-03-29 NOTE — PROGRESS NOTES
[facility]  [address], [address]  [facility], [facility]      Pulmonary Progress Note      SUBJECTIVE:  Claims stable with respiratory function, on 2 li NC, ronchorous cough with min-mod sputum production, ambulating some and tolerating    OBJECTIVE    Medications    Continuous Infusions:    Scheduled Meds:   sodium chloride  20 mL Intravenous Once    docusate sodium  100 mg Oral Daily    senna  1 tablet Oral Nightly    pantoprazole  40 mg Intravenous BID    And    sodium chloride (PF)  10 mL Intravenous BID    amoxicillin-clavulanate  1 tablet Oral 2 times per day    lisinopril  10 mg Oral Daily    ferrous sulfate  325 mg Oral BID WC    ipratropium-albuterol  1 ampule Inhalation Q4H    doxycycline hyclate  100 mg Oral 2 times per day    latanoprost  1 drop Right Eye Nightly    doxazosin  2 mg Oral Daily    sodium chloride flush  10 mL Intravenous 2 times per day       PRN Meds:diclofenac sodium, docusate sodium, sodium chloride flush, acetaminophen **OR** acetaminophen, polyethylene glycol, promethazine **OR** ondansetron    Physical    VITALS:  BP (!) 156/81   Pulse 83   Temp 97.6 °F (36.4 °C) (Oral)   Resp 18   Ht 5' 4\" (1.626 m)   Wt 150 lb 4.8 oz (68.2 kg)   SpO2 96%   BMI 25.80 kg/m²     24HR INTAKE/OUTPUT:      Intake/Output Summary (Last 24 hours) at 3/29/2020 1046  Last data filed at 3/29/2020 1586  Gross per 24 hour   Intake 180 ml   Output 500 ml   Net -320 ml       24HR PULSE OXIMETRY RANGE:    SpO2  Av.2 %  Min: 95 %  Max: 97 %    General appearance: alert, appears stated age and cooperative  Lungs: ronchi bilateral with cough  Heart: regular rate and rhythm, S1, S2 normal, no murmur, click, rub or gallop  Abdomen: soft, non-tender; bowel sounds normal; no masses,  no organomegaly  Extremities: extremities normal, atraumatic, no cyanosis or edema  Neurologic: Mental status: Alert, oriented, thought content

## 2020-03-29 NOTE — PROGRESS NOTES
Physical Therapy  Facility/Department: [facility]  Daily Treatment Note  NAME: Telly Molina  : 3/10/1931  MRN: [id number]    Date of Service: 3/29/2020      Patient Diagnosis(es): The primary encounter diagnosis was Pneumonia due to organism. Diagnoses of VALERIE (acute kidney injury) ([facility] [address] Elevated troponin, Thrombocytopenia ([facility] [address] and Anemia, unspecified type were also pertinent to this visit. has a past medical history of Abnormal brain MRI, Episode of syncope, GI bleed, and Hypertension. has a past surgical history that includes hernia repair; joint replacement (Bilateral); Carpal tunnel release (Bilateral); eye surgery; and back surgery.        Room #: 955  DIAGNOSIS: pneumonia  PRECAUTIONS: falls, safety, 02  [facility] Score: 15    Patient is agreeable to exercise only, presently receiving a blood transfusion    Pain level is: right hip pain with exercise    Therapeutic Exercise seated with B LE elevated on a stool: B ankle pumps AROM; R LE heel slide motions, hip ABd AA/PROM x 15 reps, L LE heel slide motions, hip ABd/ADd 20 reps x 2 with minimal manual resistance    Patient education  Pt educated on exercise promoting circulation and strengthening. Patient response to education:   Pt verbalized understanding Pt demonstrated skill Pt requires further education in this area   Yes  With instruction yes     ASSESSMENT:    Comments:  Nurse ok with Rx. Pt agreed to exercise only, R LE exercise limited due to R hip pain. Pt encouraged to pump ankles and keep his legs moving as able. Time in: 1048  Time out: 1105    Pt is making fair progress towards Pt goals as per exercise participation  Will continue with plan of care promoting functional mobility as able.     Tom Selby PTA [id number]

## 2020-03-29 NOTE — PROGRESS NOTES
[facility] Progress Note    Admitting Date and Time: 3/20/2020  6:24 AM  Admit Dx: Pneumonia [J18.9]  Pneumonia [J18.9]    Subjective:  Patient is being followed for Pneumonia [J18.9]  Pneumonia [J18.9]   Patient up in chair resting comfortably. Made small BM twice this morning. Overall appears to be a little bit weak. Refusing endoscopy, I had a long talk with him I recommended 2 more units of blood this morning he was somewhat in agreement. Anxious to go home. I told him I had like him to discuss scope or outpatient needs with GI before discharging to home. I explained to him that his hemoglobin very low and asymptomatic. Per RN: No adverse reactions reported at this point will follow hemoglobin today    ROS: denies fever, chills, cp, sob, n/v, HA unless stated above.  potassium chloride  20 mEq Oral BID WC    docusate sodium  100 mg Oral Daily    senna  1 tablet Oral Nightly    pantoprazole  40 mg Intravenous BID    And    sodium chloride (PF)  10 mL Intravenous BID    amoxicillin-clavulanate  1 tablet Oral 2 times per day    lisinopril  10 mg Oral Daily    ferrous sulfate  325 mg Oral BID WC    ipratropium-albuterol  1 ampule Inhalation Q4H    doxycycline hyclate  100 mg Oral 2 times per day    latanoprost  1 drop Right Eye Nightly    doxazosin  2 mg Oral Daily    sodium chloride flush  10 mL Intravenous 2 times per day     diclofenac sodium, 2 g, 4x Daily PRN  docusate sodium, 100 mg, BID PRN  sodium chloride flush, 10 mL, PRN  acetaminophen, 650 mg, Q6H PRN    Or  acetaminophen, 650 mg, Q6H PRN  polyethylene glycol, 17 g, Daily PRN  promethazine, 12.5 mg, Q6H PRN    Or  ondansetron, 4 mg, Q6H PRN         Objective:    BP (!) 170/81   Pulse 80   Temp 97.5 °F (36.4 °C) (Oral)   Resp 18   Ht 5' 4\" (1.626 m)   Wt 150 lb 4.8 oz (68.2 kg)   SpO2 96%   BMI 25.80 kg/m²     General Appearance: alert and oriented to person, place and time and in no acute distress. gallbladder. MRI brain  Impression:            1. Two tiny foci of acute to early subacute infarctions in the right  periventricular white matter. 2. No hemorrhage, mass effect or midline shift         Assessment:    Principal Problem:    Pneumonia due to organism  Active Problems:    Anemia    HTN (hypertension)    Acute kidney injury ([address]    Acute hyperglycemia    Community acquired pneumonia    Elevated troponin    Aspiration pneumonia (HCC)    Syncope    Acute blood loss anemia    Acute GI bleeding    Acute ischemic stroke [facility])  Resolved Problems:    * No resolved hospital problems. *         Plan:  1. Community-acquired pneumonia versus aspiration pneumonia. Left lower lung pneumonia. -Dysphasia noted on swallow study per SLP note - no aspiration. Regular diet for now. Chest x-ray with opacities in the lower lungs bilaterally. Blood and sputum cultures negative, respiratory array negative.     -ID input appreciated, IV antibiotics over to Augmentin and doxycycline can discharge with another 7 days.  -Recent chest x-ray still with worsening opacity in the right lower lung yesterday.     2. Acute kidney 3-resolved, IV fluids.       3.  Elevated troponin -no cardiac symptoms. Also in the setting of elevated creatinine  Creatinine 1.0 now improved.     4. Anemia -hemoglobin 7 range to 5.8 3/25 and 3/26 and  - got PRBC 3/25/20, 3/26, 3/27--hemoglobin 7 to 6.8 range now for repeat. Infuse 2 PRBC now  -Iron deficiency anemia -continue iron supplementation  - consult GI for GIB, on IV PPI BID  -Repeat H&H today   -Bleeding scan negative  -Follow with GI-questionable scope  -lovenox not given in 2 days--officially stop     5.  Elevated lipase -trend up from 200s to 600 to 500  - consult to GI     6.  Pancytopenia, white blood cell count 4.1, hemoglobin 7.6, platelets 59  -Looking back several months ago also noted.     7. Hypertension, continue with lisinopril 10 mg daily.     8.

## 2020-03-29 NOTE — PROGRESS NOTES
[address] [place] [address] [facility]  Progress Note    Face to face encounter  CC: resting comfortable in bed. SUBJECTIVE:  Chief Complaint   Patient presents with    Fever    Cough     onset saturday     Patient seen for decreased responsiveness and possible seizure-like activity. Seen by neurology for convulsive syncope versus seizure. Resting in bed this am- hemoglobin- 6.8  Reports cough is improving   Wants to go home. No fevers. Review of systems:  As stated above in the chief complaint, otherwise negative. Medications:  Scheduled Meds:   sodium chloride  20 mL Intravenous Once    docusate sodium  100 mg Oral Daily    senna  1 tablet Oral Nightly    pantoprazole  40 mg Intravenous BID    And    sodium chloride (PF)  10 mL Intravenous BID    amoxicillin-clavulanate  1 tablet Oral 2 times per day    lisinopril  10 mg Oral Daily    ferrous sulfate  325 mg Oral BID WC    ipratropium-albuterol  1 ampule Inhalation Q4H    doxycycline hyclate  100 mg Oral 2 times per day    latanoprost  1 drop Right Eye Nightly    doxazosin  2 mg Oral Daily    sodium chloride flush  10 mL Intravenous 2 times per day     Continuous Infusions:    PRN Meds:diclofenac sodium, docusate sodium, sodium chloride flush, acetaminophen **OR** acetaminophen, polyethylene glycol, promethazine **OR** ondansetron    OBJECTIVE:  BP (!) 156/81   Pulse 83   Temp 97.6 °F (36.4 °C) (Oral)   Resp 18   Ht 5' 4\" (1.626 m)   Wt 150 lb 4.8 oz (68.2 kg)   SpO2 96%   BMI 25.80 kg/m²   Temp  Av.8 °F (36.6 °C)  Min: 97.5 °F (36.4 °C)  Max: 98 °F (36.7 °C)  Constitutional: The patient is in bed today. Feeling ok today- cough is better   Skin: Warm and dry. No rashes were noted. HEENT: Round and reactive pupils. Moist mucous membranes. No ulcerations or thrush. Neck: Supple to movements. Chest: No respiratory distress. Scattered upper rhonci- mostly clears with cough. on nasal canula.   Cardiovascular: S1 and S2 are rhythmic and regular. No murmurs appreciated. Abdomen: Tender to palpation right lower quadrant. No masses were felt. No rebound tenderness. Extremities: No edema. Lines: peripheral    Laboratory and Tests Review:  Lab Results   Component Value Date    WBC 12.7 (H) 03/29/2020    WBC 16.6 (H) 03/28/2020    WBC 17.5 (H) 03/27/2020    HGB 6.8 (L) 03/29/2020    HCT 21.8 (L) 03/29/2020    MCV 91.2 03/29/2020     (L) 03/29/2020     Lab Results   Component Value Date    NEUTROABS 9.78 (H) 03/29/2020    NEUTROABS 13.61 (H) 03/28/2020    NEUTROABS 14.53 (H) 03/27/2020     No results found for: CRP  Lab Results   Component Value Date    ALT 14 03/29/2020    AST 31 03/29/2020    ALKPHOS 70 03/29/2020    BILITOT 0.4 03/29/2020     Lab Results   Component Value Date     03/29/2020    K 3.9 03/29/2020    K 3.5 03/21/2020     03/29/2020    CO2 22 03/29/2020    BUN 19 03/29/2020    CREATININE 1.1 03/29/2020    CREATININE 1.1 03/29/2020    CREATININE 1.2 03/28/2020    GFRAA >60 03/29/2020    LABGLOM >60 03/29/2020    GLUCOSE 111 03/29/2020    PROT 6.6 03/29/2020    LABALBU 2.5 03/29/2020    CALCIUM 8.6 03/29/2020    BILITOT 0.4 03/29/2020    ALKPHOS 70 03/29/2020    AST 31 03/29/2020    ALT 14 03/29/2020     Lab Results   Component Value Date    CRP 12.3 (H) 03/25/2020     No results found for: [address]  Radiology:  Swallow evaluation noted    Microbiology:   Respiratory panel: Negative  Streptococcus pneumoniae/Legionella urine Ag: negative  Rapid influenza: Negative  Blood cultures 3/21/2020: Negative x2  Respiratory culture 3/21/2020 OP fer present  Urine culture 3/21/2020: Negative    ASSESSMENT:  · Community-acquired pneumonia. Improved clinically  · Possible dysphagia. Did okay with swallow evaluation  · Fever secondary to pneumonia, resolving  · Right lower quadrant abdominal pain  · Elevated lipase  · Right lower quadrant abdominal pain.   No pathology seen on CT from 3/20/2020  · Acute anemia unclear etiology     Plan:    · Continue Augmentin and Doxycycline until 4/3/2020- tolerating with no issues. · Has repeat chest x-ray ordered- follow results. · No fevers   · Continue work-up by neurology and GI- low hemoglobin   · Monitor labs- WBC- 12.7  · From ID pov can d/c- having issues with low hemoglobin- needs GI/hem/onc workup    Leslie Olivas CNS- BC  10:43 AM  3/29/2020     Discussed with Primary team- Karl Rodriguez had a face-to-face encounter with the patient at the bedside. I agree with the nurse practitioner's note and assessment and plan as detailed above. Necessary editing and changes made to the note by myself.     Luis MULLEN

## 2020-03-29 NOTE — PROGRESS NOTES
0.005 % ophthalmic solution 1 drop, Nightly  doxazosin (CARDURA) tablet 2 mg, Daily  sodium chloride flush 0.9 % injection 10 mL, 2 times per day  sodium chloride flush 0.9 % injection 10 mL, PRN  acetaminophen (TYLENOL) tablet 650 mg, Q6H PRN    Or  acetaminophen (TYLENOL) suppository 650 mg, Q6H PRN  polyethylene glycol (GLYCOLAX) packet 17 g, Daily PRN  promethazine (PHENERGAN) tablet 12.5 mg, Q6H PRN    Or  ondansetron (ZOFRAN) injection 4 mg, Q6H PRN         Data Review  CBC:   Lab Results   Component Value Date    WBC 12.7 03/29/2020    RBC 2.39 03/29/2020    HGB 6.8 03/29/2020    HCT 21.8 03/29/2020    MCV 91.2 03/29/2020    MCH 28.5 03/29/2020    MCHC 31.2 03/29/2020    RDW 17.2 03/29/2020     03/29/2020    MPV 13.0 03/29/2020     CMP:    Lab Results   Component Value Date     03/29/2020    K 3.9 03/29/2020    K 3.5 03/21/2020     03/29/2020    CO2 22 03/29/2020    BUN 19 03/29/2020    CREATININE 1.1 03/29/2020    GFRAA >60 03/29/2020    LABGLOM >60 03/29/2020    GLUCOSE 111 03/29/2020    PROT 6.6 03/29/2020    LABALBU 2.5 03/29/2020    CALCIUM 8.6 03/29/2020    BILITOT 0.4 03/29/2020    ALKPHOS 70 03/29/2020    AST 31 03/29/2020    ALT 14 03/29/2020     Hepatic Function Panel:    Lab Results   Component Value Date    ALKPHOS 70 03/29/2020    ALT 14 03/29/2020    AST 31 03/29/2020    PROT 6.6 03/29/2020    BILITOT 0.4 03/29/2020    LABALBU 2.5 03/29/2020       PT/INR:    Lab Results   Component Value Date    PROTIME 14.0 03/29/2020    INR 1.2 03/29/2020     IRON:    Lab Results   Component Value Date    IRON 16 03/21/2020     Iron Saturation:  No components found for: PERCENTFE  FERRITIN:    Lab Results   Component Value Date    FERRITIN 1,052 03/20/2020       Assessment:     Principal Problem:  · Anemia, normocytic- has received 4 RBCs transfusion 3/25, 3/26, 3/27, 3/29  · Elevated lipase 486, 337, 283, 320  · Hepatomegaly  · Liver fibrosis   · ETOH use/ abuse  · Thrombocytopenia  · Pneumonia- defer  · Fatigue  · Early satiety  · DNR-CCA  · Fevers    Plan:       · Occult stool x1- negative this AM  · Diet per speech recommendations, as tolerated- low fat  · Antibiotic management per ID service  · Continue Protonix as ordered  · Continue FE supplementation  · Medical management per Primary Care  · Continue to medicate for nausea as ordered  · NO EGD planned at this time d/t circumstances, will monitor H&H and transfuse if indicated. Patient refuses any scope at this time. · Continue to trend labs  · Will follow    Discussed with Dr. Finn Hooker per Dr. Riley Thakur, NP-C 3/29/2020 8:45 AM For Dr. Lanetta Kocher. PATIENT REFUSING AND SCOPES AT THIS TIME, AND WILL NOT INSIST. CT NEGATIVE FOR ANY PANCREATIC/ BILIARY PATHOLOGY. MAY NEED 1 MORE UNIT OF BLOOD PRIOR TO DISCHARGE. WILL CONTINUE TO MONITOR.

## 2020-03-30 ENCOUNTER — APPOINTMENT (OUTPATIENT)
Dept: GENERAL RADIOLOGY | Age: 85
DRG: 177 | End: 2020-03-30
Payer: MEDICARE

## 2020-03-30 PROBLEM — R31.0 GROSS HEMATURIA: Status: ACTIVE | Noted: 2020-03-30

## 2020-03-30 LAB
ABO/RH: NORMAL
ALBUMIN SERPL-MCNC: 2.4 G/DL (ref 3.5–5.2)
ALP BLD-CCNC: 76 U/L (ref 40–129)
ALT SERPL-CCNC: 13 U/L (ref 0–40)
AMMONIA: 21.8 UMOL/L (ref 16–60)
AMYLASE: 192 U/L (ref 20–100)
ANION GAP SERPL CALCULATED.3IONS-SCNC: 12 MMOL/L (ref 7–16)
ANTIBODY SCREEN: NORMAL
AST SERPL-CCNC: 30 U/L (ref 0–39)
BACTERIA: ABNORMAL /HPF
BASOPHILS ABSOLUTE: 0 E9/L (ref 0–0.2)
BASOPHILS RELATIVE PERCENT: 0 % (ref 0–2)
BILIRUB SERPL-MCNC: 0.6 MG/DL (ref 0–1.2)
BILIRUBIN URINE: NEGATIVE
BLOOD, URINE: ABNORMAL
BUN BLDV-MCNC: 18 MG/DL (ref 8–23)
BURR CELLS: ABNORMAL
CALCIUM SERPL-MCNC: 8.6 MG/DL (ref 8.6–10.2)
CHLORIDE BLD-SCNC: 107 MMOL/L (ref 98–107)
CLARITY: CLEAR
CO2: 21 MMOL/L (ref 22–29)
COARSE CASTS, UA: ABNORMAL /LPF (ref 0–2)
COLOR: YELLOW
CREAT SERPL-MCNC: 1 MG/DL (ref 0.7–1.2)
CRYSTALS, UA: ABNORMAL /HPF
EOSINOPHILS ABSOLUTE: 0 E9/L (ref 0.05–0.5)
EOSINOPHILS RELATIVE PERCENT: 0 % (ref 0–6)
GFR AFRICAN AMERICAN: >60
GFR NON-AFRICAN AMERICAN: >60 ML/MIN/1.73
GLUCOSE BLD-MCNC: 98 MG/DL (ref 74–99)
GLUCOSE URINE: NEGATIVE MG/DL
HCT VFR BLD CALC: 26 % (ref 37–54)
HCT VFR BLD CALC: 27.4 % (ref 37–54)
HCT VFR BLD CALC: 28.7 % (ref 37–54)
HEMOGLOBIN: 8.1 G/DL (ref 12.5–16.5)
HEMOGLOBIN: 8.7 G/DL (ref 12.5–16.5)
HEMOGLOBIN: 9 G/DL (ref 12.5–16.5)
INR BLD: 1.2
KETONES, URINE: NEGATIVE MG/DL
LACTATE DEHYDROGENASE: 342 U/L (ref 135–225)
LEUKOCYTE ESTERASE, URINE: NEGATIVE
LIPASE: 280 U/L (ref 13–60)
LYMPHOCYTES ABSOLUTE: 1.48 E9/L (ref 1.5–4)
LYMPHOCYTES RELATIVE PERCENT: 13.9 % (ref 20–42)
MCH RBC QN AUTO: 28.9 PG (ref 26–35)
MCHC RBC AUTO-ENTMCNC: 31.2 % (ref 32–34.5)
MCV RBC AUTO: 92.9 FL (ref 80–99.9)
MONOCYTES ABSOLUTE: 1.17 E9/L (ref 0.1–0.95)
MONOCYTES RELATIVE PERCENT: 11.3 % (ref 2–12)
MYELOCYTE PERCENT: 0.9 % (ref 0–0)
NEUTROPHILS ABSOLUTE: 7.95 E9/L (ref 1.8–7.3)
NEUTROPHILS RELATIVE PERCENT: 73.9 % (ref 43–80)
NITRITE, URINE: NEGATIVE
NUCLEATED RED BLOOD CELLS: 0.9 /100 WBC
OVALOCYTES: ABNORMAL
PDW BLD-RTO: 16.6 FL (ref 11.5–15)
PH UA: 5.5 (ref 5–9)
PLATELET # BLD: 117 E9/L (ref 130–450)
PMV BLD AUTO: 13 FL (ref 7–12)
POIKILOCYTES: ABNORMAL
POLYCHROMASIA: ABNORMAL
POTASSIUM SERPL-SCNC: 4 MMOL/L (ref 3.5–5)
PROTEIN UA: 100 MG/DL
PROTHROMBIN TIME: 14.2 SEC (ref 9.3–12.4)
RBC # BLD: 2.8 E12/L (ref 3.8–5.8)
RBC UA: ABNORMAL /HPF (ref 0–2)
SEDIMENTATION RATE, ERYTHROCYTE: 127 MM/HR (ref 0–15)
SODIUM BLD-SCNC: 140 MMOL/L (ref 132–146)
SPECIFIC GRAVITY UA: >=1.03 (ref 1–1.03)
TOTAL PROTEIN: 6.6 G/DL (ref 6.4–8.3)
UROBILINOGEN, URINE: 0.2 E.U./DL
WBC # BLD: 10.6 E9/L (ref 4.5–11.5)
WBC UA: ABNORMAL /HPF (ref 0–5)

## 2020-03-30 PROCEDURE — 73560 X-RAY EXAM OF KNEE 1 OR 2: CPT

## 2020-03-30 PROCEDURE — 85025 COMPLETE CBC W/AUTO DIFF WBC: CPT

## 2020-03-30 PROCEDURE — 2060000000 HC ICU INTERMEDIATE R&B

## 2020-03-30 PROCEDURE — 73502 X-RAY EXAM HIP UNI 2-3 VIEWS: CPT

## 2020-03-30 PROCEDURE — 6370000000 HC RX 637 (ALT 250 FOR IP): Performed by: SPECIALIST

## 2020-03-30 PROCEDURE — 88112 CYTOPATH CELL ENHANCE TECH: CPT

## 2020-03-30 PROCEDURE — C9113 INJ PANTOPRAZOLE SODIUM, VIA: HCPCS | Performed by: INTERNAL MEDICINE

## 2020-03-30 PROCEDURE — 97530 THERAPEUTIC ACTIVITIES: CPT

## 2020-03-30 PROCEDURE — 92526 ORAL FUNCTION THERAPY: CPT | Performed by: SPEECH-LANGUAGE PATHOLOGIST

## 2020-03-30 PROCEDURE — 99233 SBSQ HOSP IP/OBS HIGH 50: CPT | Performed by: INTERNAL MEDICINE

## 2020-03-30 PROCEDURE — 92611 MOTION FLUOROSCOPY/SWALLOW: CPT | Performed by: SPEECH-LANGUAGE PATHOLOGIST

## 2020-03-30 PROCEDURE — 94640 AIRWAY INHALATION TREATMENT: CPT

## 2020-03-30 PROCEDURE — 86900 BLOOD TYPING SEROLOGIC ABO: CPT

## 2020-03-30 PROCEDURE — 36415 COLL VENOUS BLD VENIPUNCTURE: CPT

## 2020-03-30 PROCEDURE — 2580000003 HC RX 258: Performed by: NURSE PRACTITIONER

## 2020-03-30 PROCEDURE — 81001 URINALYSIS AUTO W/SCOPE: CPT

## 2020-03-30 PROCEDURE — 84165 PROTEIN E-PHORESIS SERUM: CPT

## 2020-03-30 PROCEDURE — 86901 BLOOD TYPING SEROLOGIC RH(D): CPT

## 2020-03-30 PROCEDURE — 82140 ASSAY OF AMMONIA: CPT

## 2020-03-30 PROCEDURE — 6370000000 HC RX 637 (ALT 250 FOR IP): Performed by: CLINICAL NURSE SPECIALIST

## 2020-03-30 PROCEDURE — 82150 ASSAY OF AMYLASE: CPT

## 2020-03-30 PROCEDURE — 2580000003 HC RX 258: Performed by: INTERNAL MEDICINE

## 2020-03-30 PROCEDURE — 85018 HEMOGLOBIN: CPT

## 2020-03-30 PROCEDURE — 85651 RBC SED RATE NONAUTOMATED: CPT

## 2020-03-30 PROCEDURE — 86850 RBC ANTIBODY SCREEN: CPT

## 2020-03-30 PROCEDURE — 83615 LACTATE (LD) (LDH) ENZYME: CPT

## 2020-03-30 PROCEDURE — 80053 COMPREHEN METABOLIC PANEL: CPT

## 2020-03-30 PROCEDURE — 6360000002 HC RX W HCPCS: Performed by: INTERNAL MEDICINE

## 2020-03-30 PROCEDURE — 85610 PROTHROMBIN TIME: CPT

## 2020-03-30 PROCEDURE — 85014 HEMATOCRIT: CPT

## 2020-03-30 PROCEDURE — 2700000000 HC OXYGEN THERAPY PER DAY

## 2020-03-30 PROCEDURE — 83690 ASSAY OF LIPASE: CPT

## 2020-03-30 PROCEDURE — 6370000000 HC RX 637 (ALT 250 FOR IP): Performed by: NURSE PRACTITIONER

## 2020-03-30 PROCEDURE — 6370000000 HC RX 637 (ALT 250 FOR IP): Performed by: INTERNAL MEDICINE

## 2020-03-30 RX ORDER — IBUPROFEN 400 MG/1
400 TABLET ORAL
Status: DISCONTINUED | OUTPATIENT
Start: 2020-03-30 | End: 2020-03-31 | Stop reason: HOSPADM

## 2020-03-30 RX ADMIN — ACETAMINOPHEN 650 MG: 325 TABLET ORAL at 00:20

## 2020-03-30 RX ADMIN — DICLOFENAC SODIUM 2 G: 10 GEL TOPICAL at 09:18

## 2020-03-30 RX ADMIN — IPRATROPIUM BROMIDE AND ALBUTEROL SULFATE 1 AMPULE: 2.5; .5 SOLUTION RESPIRATORY (INHALATION) at 08:45

## 2020-03-30 RX ADMIN — PANTOPRAZOLE SODIUM 40 MG: 40 INJECTION, POWDER, FOR SOLUTION INTRAVENOUS at 06:17

## 2020-03-30 RX ADMIN — Medication 10 ML: at 20:16

## 2020-03-30 RX ADMIN — IPRATROPIUM BROMIDE AND ALBUTEROL SULFATE 1 AMPULE: 2.5; .5 SOLUTION RESPIRATORY (INHALATION) at 16:37

## 2020-03-30 RX ADMIN — SENNOSIDES 8.6 MG: 8.6 TABLET, FILM COATED ORAL at 20:16

## 2020-03-30 RX ADMIN — IPRATROPIUM BROMIDE AND ALBUTEROL SULFATE 1 AMPULE: 2.5; .5 SOLUTION RESPIRATORY (INHALATION) at 19:31

## 2020-03-30 RX ADMIN — DICLOFENAC SODIUM 2 G: 10 GEL TOPICAL at 15:17

## 2020-03-30 RX ADMIN — Medication 10 ML: at 09:18

## 2020-03-30 RX ADMIN — SODIUM CHLORIDE, PRESERVATIVE FREE 10 ML: 5 INJECTION INTRAVENOUS at 18:27

## 2020-03-30 RX ADMIN — LATANOPROST 1 DROP: 50 SOLUTION OPHTHALMIC at 20:15

## 2020-03-30 RX ADMIN — IBUPROFEN 400 MG: 400 TABLET, FILM COATED ORAL at 12:14

## 2020-03-30 RX ADMIN — AMOXICILLIN AND CLAVULANATE POTASSIUM 1 TABLET: 875; 125 TABLET, FILM COATED ORAL at 09:16

## 2020-03-30 RX ADMIN — IPRATROPIUM BROMIDE AND ALBUTEROL SULFATE 1 AMPULE: 2.5; .5 SOLUTION RESPIRATORY (INHALATION) at 23:22

## 2020-03-30 RX ADMIN — DOXAZOSIN 2 MG: 2 TABLET ORAL at 09:17

## 2020-03-30 RX ADMIN — AMOXICILLIN AND CLAVULANATE POTASSIUM 1 TABLET: 875; 125 TABLET, FILM COATED ORAL at 20:15

## 2020-03-30 RX ADMIN — DICLOFENAC SODIUM 2 G: 10 GEL TOPICAL at 20:15

## 2020-03-30 RX ADMIN — SODIUM CHLORIDE, PRESERVATIVE FREE 10 ML: 5 INJECTION INTRAVENOUS at 06:17

## 2020-03-30 RX ADMIN — IBUPROFEN 400 MG: 400 TABLET, FILM COATED ORAL at 16:48

## 2020-03-30 RX ADMIN — FERROUS SULFATE TAB 325 MG (65 MG ELEMENTAL FE) 325 MG: 325 (65 FE) TAB at 09:17

## 2020-03-30 RX ADMIN — IPRATROPIUM BROMIDE AND ALBUTEROL SULFATE 1 AMPULE: 2.5; .5 SOLUTION RESPIRATORY (INHALATION) at 03:25

## 2020-03-30 RX ADMIN — FERROUS SULFATE TAB 325 MG (65 MG ELEMENTAL FE) 325 MG: 325 (65 FE) TAB at 16:48

## 2020-03-30 RX ADMIN — ACETAMINOPHEN 650 MG: 325 TABLET ORAL at 06:17

## 2020-03-30 RX ADMIN — DOXYCYCLINE HYCLATE 100 MG: 100 CAPSULE ORAL at 20:16

## 2020-03-30 RX ADMIN — DOXYCYCLINE HYCLATE 100 MG: 100 CAPSULE ORAL at 09:17

## 2020-03-30 RX ADMIN — LISINOPRIL 10 MG: 10 TABLET ORAL at 09:17

## 2020-03-30 RX ADMIN — PANTOPRAZOLE SODIUM 40 MG: 40 INJECTION, POWDER, FOR SOLUTION INTRAVENOUS at 18:27

## 2020-03-30 ASSESSMENT — PAIN DESCRIPTION - ORIENTATION
ORIENTATION: RIGHT

## 2020-03-30 ASSESSMENT — PAIN SCALES - GENERAL
PAINLEVEL_OUTOF10: 0
PAINLEVEL_OUTOF10: 5
PAINLEVEL_OUTOF10: 7
PAINLEVEL_OUTOF10: 5
PAINLEVEL_OUTOF10: 3
PAINLEVEL_OUTOF10: 4

## 2020-03-30 ASSESSMENT — PAIN DESCRIPTION - DESCRIPTORS
DESCRIPTORS: ACHING;CONSTANT;DISCOMFORT

## 2020-03-30 ASSESSMENT — PAIN DESCRIPTION - PAIN TYPE
TYPE: CHRONIC PAIN

## 2020-03-30 ASSESSMENT — PAIN DESCRIPTION - PROGRESSION
CLINICAL_PROGRESSION: GRADUALLY WORSENING

## 2020-03-30 ASSESSMENT — PAIN DESCRIPTION - ONSET
ONSET: ON-GOING

## 2020-03-30 ASSESSMENT — PAIN - FUNCTIONAL ASSESSMENT
PAIN_FUNCTIONAL_ASSESSMENT: PREVENTS OR INTERFERES SOME ACTIVE ACTIVITIES AND ADLS

## 2020-03-30 ASSESSMENT — PAIN DESCRIPTION - FREQUENCY
FREQUENCY: CONTINUOUS

## 2020-03-30 ASSESSMENT — PAIN DESCRIPTION - LOCATION
LOCATION: HIP;LEG

## 2020-03-30 NOTE — PROGRESS NOTES
Dr. Shirley Lynn notified of bleed around scrotum this am as well as pain and weakness to RLE. Orders received.

## 2020-03-30 NOTE — PROGRESS NOTES
Subjective  Chief Complaint - shortness of breath    History of Present Illness  Taken from previous notes for continuity of care:  3/29  Patient up in chair resting comfortably. Made small BM twice this morning. Overall appears to be a little bit weak. Refusing endoscopy, I had a long talk with him I recommended 2 more units of blood this morning he was somewhat in agreement. Anxious to go home. I told him I had like him to discuss scope or outpatient needs with GI before discharging to home. I explained to him that his hemoglobin very low and asymptomatic. 3/23  This is Dr. Chuck Bowen assuming care of this patient. Patient seen and examined sitting up in chair at bedside. Patient says he is breathing well today, on 2 L nasal cannula, still has a productive cough that is able to produce sputum easily. No chest pain, fevers, chills. Biggest complaint at this time is pain that he says starts in his right knee, radiates up his lateral right thigh, into his right hip/buttock, and up into his lower back. He says this is preventing him from walking. Most of the pain seems to be concentrated in the right lateral thigh, which is tender to palpation but without swelling, erythema, or wound. Denies hematuria, hematochezia, melena. Wants to go home; would benefit from hospital bed at home as well as home healthcare.     Review of Systems - 12-point review of systems has been reviewed and is otherwise negative except as listed in the HPI    Objective  Physical Exam - physical exam normal as below EXCEPT FOR ABNORMAL FINDINGS as listed immediately below line, which supersede the normal findings listed  General: well-developed, well-nourished, no acute distress, cooperative  Skin: warm, dry, intact, normal color without cyanosis  HEENT: normocephalic, atraumatic, mucous membranes normal  Respiratory: clear to auscultation bilaterally without respiratory distress  Cardiovascular: regular rate and rhythm without murmur

## 2020-03-30 NOTE — CONSULTS
(L) 03/30/2020    MCH 28.9 03/30/2020    MCHC 31.2 (L) 03/30/2020    RDW 16.6 (H) 03/30/2020    NEUTOPHILPCT 73.9 03/30/2020    MONOPCT 11.3 03/30/2020    BASOPCT 0.0 03/30/2020    NEUTROABS 7.95 (H) 03/30/2020    LYMPHSABS 1.48 (L) 03/30/2020    MONOSABS 1.17 (H) 03/30/2020    EOSABS 0.00 (L) 03/30/2020    BASOSABS 0.00 03/30/2020       Lab Results   Component Value Date     03/30/2020    K 4.0 03/30/2020     03/30/2020    CO2 21 (L) 03/30/2020    BUN 18 03/30/2020    CREATININE 1.0 03/30/2020    GLUCOSE 98 03/30/2020    CALCIUM 8.6 03/30/2020    PROT 6.6 03/30/2020    LABALBU 2.4 (L) 03/30/2020    BILITOT 0.6 03/30/2020    ALKPHOS 76 03/30/2020    AST 30 03/30/2020    ALT 13 03/30/2020    LABGLOM >60 03/30/2020    GFRAA >60 03/30/2020       Lab Results   Component Value Date    IRON 16 (L) 03/21/2020    TIBC 122 (L) 03/21/2020    FERRITIN 1,052 03/20/2020           Radiology-    XR CHEST PORTABLE   Final Result      Bilateral lower lobe pneumonia, intervally developed on the left with   small pleural effusions. CTA HEAD W CONTRAST   Final Result   1. No CT evidence of acute intracranial hemorrhage. 2. 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 3. No evidence of large vessel occlusion within the brain. 4. Scattered atherosclerotic disease resulting in mild to moderate   narrowing of the cerebral and neck arterial vasculature. 5. Moderate right and small left pleural effusions. CTA NECK W CONTRAST   Final Result   1. No CT evidence of acute intracranial hemorrhage. 2. 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 3. No evidence of large vessel occlusion within the brain.    4. Scattered atherosclerotic disease resulting

## 2020-03-30 NOTE — PROGRESS NOTES
PROGRESS NOTE    Patient Presents with/Seen in Consultation For      *Reason for Consult: elevated lipase, GIB, anemia   CHIEF COMPLAINT:  Extreme fatigue    Subjective:     Patient denies abd pain, n/v. States he wants to go home. Pt reports appetite is good. He states he had 2 brown stools this am.     Review of Systems  Aside from what was mentioned in the PMH and HPI, essentially unremarkable, all others negative. Objective:     BP (!) 160/75   Pulse 73   Temp 98 °F (36.7 °C) (Oral)   Resp 20   Ht 5' 4\" (1.626 m)   Wt 151 lb 4.8 oz (68.6 kg)   SpO2 98%   BMI 25.97 kg/m²     General appearance: alert, awake, up in chair in no apparent distress, and cooperative  Eyes: conjunctiva pale, sclera anicteric. PERRL.   Lungs: clear to auscultation bilaterally  Heart: regular rate and rhythm, no murmur, 2+ pulses; trace BLE edema  Abdomen: soft, non-tender,  bowel sounds normal; no masses  Extremities: trace BLE edema  Pulses: 2+ and symmetric  Skin: Skin color pale, dry texture, turgor fair  Neurologic: Grossly normal    ibuprofen (ADVIL;MOTRIN) tablet 400 mg, TID WC  0.9 % sodium chloride bolus, Once  docusate sodium (COLACE) capsule 100 mg, Daily  senna (SENOKOT) tablet 8.6 mg, Nightly  diclofenac sodium (VOLTAREN) 1 % gel 2 g, 4x Daily PRN  pantoprazole (PROTONIX) injection 40 mg, BID    And  sodium chloride (PF) 0.9 % injection 10 mL, BID  amoxicillin-clavulanate (AUGMENTIN) 875-125 MG per tablet 1 tablet, 2 times per day  lisinopril (PRINIVIL;ZESTRIL) tablet 10 mg, Daily  ferrous sulfate (IRON 325) tablet 325 mg, BID WC  ipratropium-albuterol (DUONEB) nebulizer solution 1 ampule, Q4H  doxycycline hyclate (VIBRAMYCIN) capsule 100 mg, 2 times per day  docusate sodium (COLACE) capsule 100 mg, BID PRN  latanoprost (XALATAN) 0.005 % ophthalmic solution 1 drop, Nightly  doxazosin (CARDURA) tablet 2 mg, Daily  sodium chloride flush 0.9 % injection 10 mL, 2 times per day  sodium chloride flush 0.9 % injection 10

## 2020-03-30 NOTE — PLAN OF CARE
Plan of care    Bailey Harp is a 80 y.o. right handed male      Neurology is following for seizure-like activity     PMH: Hypertension, hyperlipidemia, iron deficiency anemia, pancytopenia, GI bleed, former smoker    Convulsive syncope in the setting of community-acquired pneumonia and anemia. ---CT head no acute abnormality  --- MRI brain shows 2 very small acute to early subacute infarcts in the right periventricular white matter  ---EEG completed 3/25 showed mild general slowing; no seizure activity   ---CTA head and neck showed no hemorrhage; occlusion of proximal left vertebral artery with small reconstitution in addition to scattered atherosclerotic disease resulting in mild to moderate narrowing the cerebral and neck arterial vasculature.   No LVO  ---A1C 0.5, LDL 29     Severe anemia with GI bleed present  ---4 RBCs transfusions  ---On Protonix BID with GI following  ---Elevated lipase and amylase  ---no plan for EGD at present     Plan:   Continue supportive care  No need for statin at present  Start aspirin once okay with GI  PT/OT/ST as able  SCDs  Stroke education  Neurology to sign off     Junious Sine, APRN-CNP

## 2020-03-30 NOTE — PROGRESS NOTES
[id number] [facility] notified of consult via the office.  Ben real

## 2020-03-30 NOTE — CARE COORDINATION
Social Work/Discharge Planning:  Received call from patient wife Jt Polanoc stating she does not want her  to return to a snf. She states \"plan is home due to what's going on\". She states they live in a two story house and they have a stair lift to second floor. PTA patient uses a wheeled walker. Jt Polanco states she will have private duty caregivers through [employer]. Patient has a bedside commode at home. Pat states patient PCP was Dr. Hilda Singh but he has seen him in years. She states his PCP is Dr. Kendrick Stapleton at [facility] and he was last seen on 3/15. Jt Polanco is requesting a hospital bed through [facility]. Jt Polanco prefers home health care through [facility]. Jt Polanco states her son will assist with transport at discharge. Referral made to Emiliano Ortiz with [facility] and patient will need a home health care order. Patient currently requiring three liters of oxygen and nursing to wean as tolerated. Will continue to follow.   Electronically signed by HAYLEY Dawn on 3/30/2020 at 10:55 AM

## 2020-03-31 VITALS
HEIGHT: 64 IN | DIASTOLIC BLOOD PRESSURE: 88 MMHG | BODY MASS INDEX: 25.71 KG/M2 | HEART RATE: 78 BPM | OXYGEN SATURATION: 92 % | SYSTOLIC BLOOD PRESSURE: 154 MMHG | RESPIRATION RATE: 18 BRPM | TEMPERATURE: 97.9 F | WEIGHT: 150.6 LBS

## 2020-03-31 LAB
AMMONIA: 21 UMOL/L (ref 16–60)
AMYLASE: 197 U/L (ref 20–100)
HCT VFR BLD CALC: 22 %
HCT VFR BLD CALC: 27.4 % (ref 37–54)
HEMOGLOBIN: 8.7 G/DL (ref 12.5–16.5)
INR BLD: 1.2
LIPASE: 281 U/L (ref 13–60)
PROTHROMBIN TIME: 14.1 SEC (ref 9.3–12.4)
RBC FOLATE: 536 NG/ML

## 2020-03-31 PROCEDURE — C9113 INJ PANTOPRAZOLE SODIUM, VIA: HCPCS | Performed by: INTERNAL MEDICINE

## 2020-03-31 PROCEDURE — 92526 ORAL FUNCTION THERAPY: CPT | Performed by: SPEECH-LANGUAGE PATHOLOGIST

## 2020-03-31 PROCEDURE — 82140 ASSAY OF AMMONIA: CPT

## 2020-03-31 PROCEDURE — 36415 COLL VENOUS BLD VENIPUNCTURE: CPT

## 2020-03-31 PROCEDURE — 6370000000 HC RX 637 (ALT 250 FOR IP): Performed by: SPECIALIST

## 2020-03-31 PROCEDURE — 6360000002 HC RX W HCPCS: Performed by: INTERNAL MEDICINE

## 2020-03-31 PROCEDURE — 6370000000 HC RX 637 (ALT 250 FOR IP): Performed by: INTERNAL MEDICINE

## 2020-03-31 PROCEDURE — 2580000003 HC RX 258: Performed by: NURSE PRACTITIONER

## 2020-03-31 PROCEDURE — 83690 ASSAY OF LIPASE: CPT

## 2020-03-31 PROCEDURE — 6370000000 HC RX 637 (ALT 250 FOR IP): Performed by: NURSE PRACTITIONER

## 2020-03-31 PROCEDURE — 94640 AIRWAY INHALATION TREATMENT: CPT

## 2020-03-31 PROCEDURE — 82150 ASSAY OF AMYLASE: CPT

## 2020-03-31 PROCEDURE — 97535 SELF CARE MNGMENT TRAINING: CPT

## 2020-03-31 PROCEDURE — 85610 PROTHROMBIN TIME: CPT

## 2020-03-31 PROCEDURE — 99239 HOSP IP/OBS DSCHRG MGMT >30: CPT | Performed by: INTERNAL MEDICINE

## 2020-03-31 PROCEDURE — 85018 HEMOGLOBIN: CPT

## 2020-03-31 PROCEDURE — 97530 THERAPEUTIC ACTIVITIES: CPT

## 2020-03-31 PROCEDURE — 6370000000 HC RX 637 (ALT 250 FOR IP): Performed by: CLINICAL NURSE SPECIALIST

## 2020-03-31 PROCEDURE — 97530 THERAPEUTIC ACTIVITIES: CPT | Performed by: PHYSICAL THERAPIST

## 2020-03-31 PROCEDURE — 85014 HEMATOCRIT: CPT

## 2020-03-31 RX ORDER — ASPIRIN 81 MG/1
81 TABLET ORAL DAILY
Qty: 30 TABLET | Refills: 0 | Status: SHIPPED | OUTPATIENT
Start: 2020-03-31 | End: 2020-03-31 | Stop reason: SDUPTHER

## 2020-03-31 RX ORDER — SIMVASTATIN 20 MG
20 TABLET ORAL NIGHTLY
Qty: 30 TABLET | Refills: 0 | Status: SHIPPED | OUTPATIENT
Start: 2020-03-31 | End: 2020-03-31 | Stop reason: SDUPTHER

## 2020-03-31 RX ORDER — SIMVASTATIN 20 MG
20 TABLET ORAL NIGHTLY
Qty: 30 TABLET | Refills: 0 | Status: SHIPPED | OUTPATIENT
Start: 2020-03-31

## 2020-03-31 RX ORDER — AMOXICILLIN AND CLAVULANATE POTASSIUM 875; 125 MG/1; MG/1
1 TABLET, FILM COATED ORAL EVERY 12 HOURS SCHEDULED
Qty: 14 TABLET | Refills: 0 | Status: SHIPPED | OUTPATIENT
Start: 2020-03-31 | End: 2020-03-31

## 2020-03-31 RX ORDER — FERROUS SULFATE 325(65) MG
325 TABLET ORAL 2 TIMES DAILY WITH MEALS
Qty: 30 TABLET | Refills: 3 | Status: SHIPPED | OUTPATIENT
Start: 2020-03-31

## 2020-03-31 RX ORDER — DOXYCYCLINE HYCLATE 100 MG/1
100 CAPSULE ORAL EVERY 12 HOURS SCHEDULED
Qty: 14 CAPSULE | Refills: 0 | Status: SHIPPED | OUTPATIENT
Start: 2020-03-31 | End: 2020-03-31

## 2020-03-31 RX ORDER — DOXYCYCLINE HYCLATE 100 MG/1
100 CAPSULE ORAL EVERY 12 HOURS SCHEDULED
Qty: 14 CAPSULE | Refills: 0 | Status: SHIPPED | OUTPATIENT
Start: 2020-03-31 | End: 2020-04-07

## 2020-03-31 RX ORDER — FERROUS SULFATE 325(65) MG
325 TABLET ORAL 2 TIMES DAILY WITH MEALS
Qty: 30 TABLET | Refills: 3 | Status: SHIPPED | OUTPATIENT
Start: 2020-03-31 | End: 2020-03-31

## 2020-03-31 RX ORDER — AMOXICILLIN AND CLAVULANATE POTASSIUM 875; 125 MG/1; MG/1
1 TABLET, FILM COATED ORAL EVERY 12 HOURS SCHEDULED
Qty: 14 TABLET | Refills: 0 | Status: SHIPPED | OUTPATIENT
Start: 2020-03-31 | End: 2020-04-07

## 2020-03-31 RX ORDER — ASPIRIN 81 MG/1
81 TABLET ORAL DAILY
Qty: 30 TABLET | Refills: 0 | Status: SHIPPED | OUTPATIENT
Start: 2020-03-31 | End: 2020-05-13

## 2020-03-31 RX ADMIN — DOXYCYCLINE HYCLATE 100 MG: 100 CAPSULE ORAL at 09:18

## 2020-03-31 RX ADMIN — IPRATROPIUM BROMIDE AND ALBUTEROL SULFATE 1 AMPULE: 2.5; .5 SOLUTION RESPIRATORY (INHALATION) at 08:37

## 2020-03-31 RX ADMIN — AMOXICILLIN AND CLAVULANATE POTASSIUM 1 TABLET: 875; 125 TABLET, FILM COATED ORAL at 09:19

## 2020-03-31 RX ADMIN — PANTOPRAZOLE SODIUM 40 MG: 40 INJECTION, POWDER, FOR SOLUTION INTRAVENOUS at 05:44

## 2020-03-31 RX ADMIN — IPRATROPIUM BROMIDE AND ALBUTEROL SULFATE 1 AMPULE: 2.5; .5 SOLUTION RESPIRATORY (INHALATION) at 03:34

## 2020-03-31 RX ADMIN — DICLOFENAC SODIUM 2 G: 10 GEL TOPICAL at 05:38

## 2020-03-31 RX ADMIN — IPRATROPIUM BROMIDE AND ALBUTEROL SULFATE 1 AMPULE: 2.5; .5 SOLUTION RESPIRATORY (INHALATION) at 12:02

## 2020-03-31 RX ADMIN — LISINOPRIL 10 MG: 10 TABLET ORAL at 09:18

## 2020-03-31 RX ADMIN — IBUPROFEN 400 MG: 400 TABLET, FILM COATED ORAL at 09:08

## 2020-03-31 RX ADMIN — FERROUS SULFATE TAB 325 MG (65 MG ELEMENTAL FE) 325 MG: 325 (65 FE) TAB at 09:18

## 2020-03-31 RX ADMIN — DOCUSATE SODIUM 100 MG: 100 CAPSULE, LIQUID FILLED ORAL at 09:18

## 2020-03-31 RX ADMIN — DOXAZOSIN 2 MG: 2 TABLET ORAL at 09:18

## 2020-03-31 RX ADMIN — Medication 10 ML: at 09:08

## 2020-03-31 RX ADMIN — DICLOFENAC SODIUM 2 G: 10 GEL TOPICAL at 09:07

## 2020-03-31 ASSESSMENT — PAIN SCALES - GENERAL
PAINLEVEL_OUTOF10: 0
PAINLEVEL_OUTOF10: 0

## 2020-03-31 NOTE — PROGRESS NOTES
mL, PRN  acetaminophen (TYLENOL) tablet 650 mg, Q6H PRN    Or  acetaminophen (TYLENOL) suppository 650 mg, Q6H PRN  polyethylene glycol (GLYCOLAX) packet 17 g, Daily PRN  promethazine (PHENERGAN) tablet 12.5 mg, Q6H PRN    Or  ondansetron (ZOFRAN) injection 4 mg, Q6H PRN         Data Review  CBC:   Lab Results   Component Value Date    WBC 10.6 03/30/2020    RBC 2.80 03/30/2020    HGB 8.7 03/31/2020    HCT 27.4 03/31/2020    MCV 92.9 03/30/2020    MCH 28.9 03/30/2020    MCHC 31.2 03/30/2020    RDW 16.6 03/30/2020     03/30/2020    MPV 13.0 03/30/2020     CMP:    Lab Results   Component Value Date     03/30/2020    K 4.0 03/30/2020    K 3.5 03/21/2020     03/30/2020    CO2 21 03/30/2020    BUN 18 03/30/2020    CREATININE 1.0 03/30/2020    GFRAA >60 03/30/2020    LABGLOM >60 03/30/2020    GLUCOSE 98 03/30/2020    PROT 6.6 03/30/2020    LABALBU 2.4 03/30/2020    CALCIUM 8.6 03/30/2020    BILITOT 0.6 03/30/2020    ALKPHOS 76 03/30/2020    AST 30 03/30/2020    ALT 13 03/30/2020     Hepatic Function Panel:    Lab Results   Component Value Date    ALKPHOS 76 03/30/2020    ALT 13 03/30/2020    AST 30 03/30/2020    PROT 6.6 03/30/2020    BILITOT 0.6 03/30/2020    LABALBU 2.4 03/30/2020     No components found for: Richie Fried results found for: TRIGte    LDLCALC 29 03/27/2020      Lab Results   Component Value Date    HDL 39 03/27/2020      Lab Results   Component Value Date    LDLCALC 29 03/27/2020   Lab Results   Component Value Date    PROTIME 14.1 03/31/2020    INR 1.2 03/31/2020     IRON:    Lab Results   Component Value Date    IRON 16 03/21/2020     Iron Saturation:  No components found for: PERCENTFE  FERRITIN:    Lab Results   Component Value Date    FERRITIN 1,052 03/20/2020         Assessment:     Principal Problem:    Pneumonia due to organism  Active Problems:  · Anemia, normocytic- has received 4 RBCs transfusion 3/25, 3/26, 3/27, 3/29 - Hgb 8.1 today without melena or hematemesis  · Elevated

## 2020-03-31 NOTE — DISCHARGE SUMMARY
Subjective  Chief Complaint - shortness of breath     History of Present Illness  Taken from previous notes for continuity of care:  3/29  Patient up in chair resting comfortably.  Made small BM twice this morning.  Overall appears to be a little bit weak.  Refusing endoscopy, I had a long talk with him I recommended 2 more units of blood this morning he was somewhat in agreement.  Anxious to go home.  I told him I had like him to discuss scope or outpatient needs with GI before discharging to home.  I explained to him that his hemoglobin very low and asymptomatic.     3/30  This is Dr. Parul Last assuming care of this patient. Patient seen and examined sitting up in chair at bedside. Patient says he is breathing well today, on 2 L nasal cannula, still has a productive cough that is able to produce sputum easily. No chest pain, fevers, chills. Biggest complaint at this time is pain that he says starts in his right knee, radiates up his lateral right thigh, into his right hip/buttock, and up into his lower back. He says this is preventing him from walking. Most of the pain seems to be concentrated in the right lateral thigh, which is tender to palpation but without swelling, erythema, or wound. Denies hematuria, hematochezia, melena. Wants to go home; would benefit from hospital bed at home as well as home healthcare.     3/31  Patient seen and examined at bedside. He was weaned off nasal cannula oxygen and breathing well on room air. Cough has improved. No further fevers, chills, no chest pain. Still having pain in his right lateral thigh; x-ray imaging was discussed which showed severe arthritis of the knee. Gave patient the option of further work-up here including orthopedic and/or neurologic consultations, but patient said he prefer to follow-up with orthopedics on an outpatient basis.   Regarding the hematuria noted yesterday, patient says this, as well as his anemia, was due to him having to use the

## 2020-03-31 NOTE — PROGRESS NOTES
Blood and Formerly named [facility]  Hematology/Oncology      Patient Name: Claude Brooking  YOB: 1931  PCP: No primary care provider on file. Referring Provider:      Reason for Consultation:   Chief Complaint   Patient presents with    Fever    Cough     onset saturday      Subjective :  Resting comfortably      History of Present Illness: This pt is a very pleasant 81 yo male who presented with fever and cough, weakness and diarrhea for ~1 week PTA. In the ER, he underwent imaging and was diagnosed with PNA due to infiltrates in both lung bases L>R. He was placed on abx and is now on doxycycline and augmentin. He also was found to have anemia with Hgb 7.6 and thrombocytopenia with platelets of 84. Iron profile showed elevated ferritin of 1052 and low serum iron and TIBC, consistent with AOCD. CRP was 12.3. His Hgb dropped several times and he has received a total of 8 units pRBCs. NM bleeding scan has been negative, and he has refused endoscopy. He also had a syncopal episode and was seen by neurology. 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  He also had a mild VALERIE on admission which has since resolved    Diagnostic Data:     Past Medical History:   Diagnosis Date    Abnormal brain MRI 03/26/2020    Episode of syncope 03/25/2020    GI bleed     FOBT negative 3/29/20    Hypertension        Patient Active Problem List    Diagnosis Date Noted    Gross hematuria 03/30/2020    Acute ischemic stroke ([address]     Acute blood loss anemia     Syncope     Aspiration pneumonia ([address]     Pneumonia due to organism 03/20/2020    Anemia 03/20/2020    HTN (hypertension) 03/20/2020    Acute kidney injury ([address] 03/20/2020    Acute hyperglycemia 03/20/2020    Community acquired pneumonia     Elevated troponin         Past Surgical History:   Procedure Laterality Date    BACK SURGERY      CARPAL TUNNEL RELEASE Bilateral     EYE SURGERY      HERNIA REPAIR      to COVID19 crisis    Recent Laboratory Data-   Lab Results   Component Value Date    WBC 10.6 03/30/2020    HGB 8.7 (L) 03/31/2020    HCT 27.4 (L) 03/31/2020    MCV 92.9 03/30/2020     (L) 03/30/2020    LYMPHOPCT 13.9 (L) 03/30/2020    RBC 2.80 (L) 03/30/2020    MCH 28.9 03/30/2020    MCHC 31.2 (L) 03/30/2020    RDW 16.6 (H) 03/30/2020    NEUTOPHILPCT 73.9 03/30/2020    MONOPCT 11.3 03/30/2020    BASOPCT 0.0 03/30/2020    NEUTROABS 7.95 (H) 03/30/2020    LYMPHSABS 1.48 (L) 03/30/2020    MONOSABS 1.17 (H) 03/30/2020    EOSABS 0.00 (L) 03/30/2020    BASOSABS 0.00 03/30/2020       Lab Results   Component Value Date     03/30/2020    K 4.0 03/30/2020     03/30/2020    CO2 21 (L) 03/30/2020    BUN 18 03/30/2020    CREATININE 1.0 03/30/2020    GLUCOSE 98 03/30/2020    CALCIUM 8.6 03/30/2020    PROT 6.6 03/30/2020    LABALBU 2.4 (L) 03/30/2020    BILITOT 0.6 03/30/2020    ALKPHOS 76 03/30/2020    AST 30 03/30/2020    ALT 13 03/30/2020    LABGLOM >60 03/30/2020    GFRAA >60 03/30/2020       Lab Results   Component Value Date    IRON 16 (L) 03/21/2020    TIBC 122 (L) 03/21/2020    FERRITIN 1,052 03/20/2020           Radiology-    XR HIP 2-3 VW W PELVIS RIGHT   Final Result   Stable bilateral total hip arthroplasties with no   significant change in alignment. The study was dictated by Dex Ortega PA-C and Washington Abreu MD   reviewed and concurred with the findings. XR KNEE RIGHT (1-2 VIEWS)   Final Result   Severe degenerative changes of the right knee. The study was dictated by Dex Ortega PA-C and Washington Abreu MD   reviewed and concurred with the findings. XR CHEST PORTABLE   Final Result      Bilateral lower lobe pneumonia, intervally developed on the left with   small pleural effusions. CTA HEAD W CONTRAST   Final Result   1. No CT evidence of acute intracranial hemorrhage.    2. Occlusion of the proximal left vertebral artery with small caliber   reconstitution at reviewed and concurred with these findings. FL MODIFIED BARIUM SWALLOW W VIDEO   Final Result   Study is remarkable for transient laryngeal penetration   with thin consistency. This procedure was performed and dictated by Trinidad Guthrie PA-C with   indirect supervision, and Lisha Jain. Fernie RILEY reviewed and concurred with   the findings. XR CHEST PORTABLE   Final Result   There is some hazy density in the infrahilar regions, improved in   comparison with the prior studies, suggesting resolving peribronchial   interstitial pneumonitis. No new or progressive pathologic findings are evident. CT CHEST WO CONTRAST   Final Result   Opacities at both lung bases. Suspect a likely combination of   atelectasis and infiltrate, especially on the left. Trace left pleural   effusion. See above. CT ABDOMEN PELVIS W IV CONTRAST Additional Contrast? None   Final Result   Patchy bibasilar infiltrates and small pleural effusion which may be   due to pneumonia or edema. Hepatomegaly with  possible liver disease. Consider hepatobiliary scan   for evaluation of gallbladder. XR CHEST PORTABLE   Final Result   Developing perihilar airspace disease as noted. This may represent   edema or inflammatory infiltrate. ASSESSMENT/PLAN :  79 yo male  PNA, on abx  Syncope  Anemia  Thrombocytopenia    - Anemia is likely due to blood loss with recurrent drops during this admission. Iron profile on admission and CRP of 12.3, both consistent with a degree of AOCD (ferritin was ~1000). No evidence of anemia due to CKD, VALERIE on admission resolved rapidly  - NM Bleeding scan negative  - Patient refused scopes  - Monitor CBC and transfuse for Hgb <7  - Smear and repeat iron profile would be worthless at this point.  Will check SPEP, LDH and ESR  - Thrombocytopenia improving, likely due to myelosuppression from inflammation/infection and consumption due to bleeding  - On augmentin and

## 2020-03-31 NOTE — PROGRESS NOTES
CT from 3/20/2020  · Acute anemia unclear etiology     Plan:    · Continue Augmentin and Doxycycline until 4/3/2020- tolerating with no issues. · Increase activity as tolerated- pulmonary toilet- pulmonary following    · Continue work-up by neurology and GI- low hemoglobin   · Urology following patient  · Monitor labs- WBC- 10.6 today  · From ID pov can d/c  · For discharge today    Herlinda Pulido- BC  11:23 AM  3/31/2020     Patient seen and examined. I had a face to face encounter with the patient. Agree with exam.  Assessment and plan as outlined above and directed by me. Addition and corrections were done as deemed appropriate. My exam and plan include: The patient is dressed up and getting ready to go home. He can be discharged and the antibiotics have been reconciled. He can follow-up with his primary care physician.     Nya Souza  3/31/2020

## 2020-04-01 ENCOUNTER — CARE COORDINATION (OUTPATIENT)
Dept: CASE MANAGEMENT | Age: 85
End: 2020-04-01

## 2020-04-01 LAB
ALBUMIN SERPL-MCNC: 2.4 G/DL (ref 3.5–4.7)
ALPHA-1-GLOBULIN: 0.5 G/DL (ref 0.2–0.4)
ALPHA-2-GLOBULIN: 1 G/FL (ref 0.5–1)
BETA GLOBULIN: 1.1 G/DL (ref 0.8–1.3)
ELECTROPHORESIS: ABNORMAL
GAMMA GLOBULIN: 2 G/DL (ref 0.7–1.6)
TOTAL PROTEIN: 7 G/DL (ref 6.4–8.3)

## 2020-04-01 NOTE — CARE COORDINATION
he continues to have chronic hip and back pain and is hoping the new hospital bed will help. She stated he had another \"episode\" this morning. She went on to explain that the \"episode\" was similar to one he had in the hospital that was explained to her as either a seizure or TIA. She stated he was on the bedside commode and his \"eyes rolled back and he shuttered\" and once her son got him back to bed he became coherent and was able to hold a conversation. She reports notifying Dr. Saundra Burt nurse at the [place] and was advised to take him to the ED, she declined returning to the ED and will monitor him at home due to the [place] pandemic. She stated he has been coherent in between napping the rest of the day. Rachel Cedeno denies any complaints of SOB, cough, or chest tightness. She reports intermittent loose stool and denies Ricci straining to move his bowels during the \"episode\" this morning. Rachel Cedeno reports that the private duty caregiver from [facility] was out this morning and she has not heard from [facility] yet. CTN will contact [facility] and she is in agreement. She reports that their son also lives with them and is able to help her care for [facility]. Med review completed. Spoke with Kat Hutson at [facility], she stated [facility] is scheduled for [facility] tomorrow, 4/2/2020. Updated Pat, CTN explained that a member of the Care Transition Central Team will be contacting them for further follow up calls, she is in agreement and denies any other needs or concerns at this time. Follow Up  No future appointments.     Rodolfo Valentine RN

## 2020-04-08 ENCOUNTER — CARE COORDINATION (OUTPATIENT)
Dept: CASE MANAGEMENT | Age: 85
End: 2020-04-08

## 2020-04-15 ENCOUNTER — CARE COORDINATION (OUTPATIENT)
Dept: CASE MANAGEMENT | Age: 85
End: 2020-04-15

## 2020-04-22 ENCOUNTER — HOSPITAL ENCOUNTER (OUTPATIENT)
Age: 85
Discharge: HOME | End: 2020-04-24
Payer: MEDICARE

## 2020-04-22 LAB
ANION GAP SERPL CALCULATED.3IONS-SCNC: 16 MMOL/L (ref 7–16)
BUN BLDV-MCNC: 22 MG/DL (ref 8–23)
CALCIUM SERPL-MCNC: 9.8 MG/DL (ref 8.6–10.2)
CHLORIDE BLD-SCNC: 100 MMOL/L (ref 98–107)
CO2: 19 MMOL/L (ref 22–29)
CREAT SERPL-MCNC: 0.8 MG/DL (ref 0.7–1.2)
GFR AFRICAN AMERICAN: >60
GFR NON-AFRICAN AMERICAN: >60 ML/MIN/1.73
GLUCOSE BLD-MCNC: 109 MG/DL (ref 74–99)
HCT VFR BLD CALC: 24.3 % (ref 37–54)
HEMOGLOBIN: 6.8 G/DL (ref 12.5–16.5)
MCH RBC QN AUTO: 29.1 PG (ref 26–35)
MCHC RBC AUTO-ENTMCNC: 28 % (ref 32–34.5)
MCV RBC AUTO: 103.8 FL (ref 80–99.9)
PDW BLD-RTO: 20.9 FL (ref 11.5–15)
PLATELET # BLD: 105 E9/L (ref 130–450)
PMV BLD AUTO: 12.1 FL (ref 7–12)
POTASSIUM SERPL-SCNC: 4.3 MMOL/L (ref 3.5–5)
RBC # BLD: 2.34 E12/L (ref 3.8–5.8)
SODIUM BLD-SCNC: 135 MMOL/L (ref 132–146)
WBC # BLD: 4.5 E9/L (ref 4.5–11.5)

## 2020-04-22 PROCEDURE — 36415 COLL VENOUS BLD VENIPUNCTURE: CPT

## 2020-04-22 PROCEDURE — 80048 BASIC METABOLIC PNL TOTAL CA: CPT

## 2020-04-22 PROCEDURE — 85027 COMPLETE CBC AUTOMATED: CPT

## 2020-04-24 ENCOUNTER — HOSPITAL ENCOUNTER (INPATIENT)
Age: 85
LOS: 4 days | Discharge: HOME HEALTH | DRG: 393 | End: 2020-04-28
Attending: EMERGENCY MEDICINE | Admitting: FAMILY MEDICINE
Payer: MEDICARE

## 2020-04-24 ENCOUNTER — APPOINTMENT (OUTPATIENT)
Dept: GENERAL RADIOLOGY | Age: 85
DRG: 393 | End: 2020-04-24
Payer: MEDICARE

## 2020-04-24 PROBLEM — M19.90 ARTHRITIS: Status: ACTIVE | Noted: 2020-04-24

## 2020-04-24 PROBLEM — I51.89 GRADE I DIASTOLIC DYSFUNCTION: Status: ACTIVE | Noted: 2020-04-24

## 2020-04-24 PROBLEM — R60.0 PERIPHERAL EDEMA: Status: ACTIVE | Noted: 2020-04-24

## 2020-04-24 PROBLEM — R60.9 PERIPHERAL EDEMA: Status: ACTIVE | Noted: 2020-04-24

## 2020-04-24 LAB
ABO/RH: NORMAL
ALBUMIN SERPL-MCNC: 3.1 G/DL (ref 3.5–5.2)
ALP BLD-CCNC: 94 U/L (ref 40–129)
ALT SERPL-CCNC: 8 U/L (ref 0–40)
ANION GAP SERPL CALCULATED.3IONS-SCNC: 14 MMOL/L (ref 7–16)
ANISOCYTOSIS: ABNORMAL
ANTIBODY SCREEN: NORMAL
AST SERPL-CCNC: 21 U/L (ref 0–39)
BACTERIA: ABNORMAL /HPF
BASOPHILS ABSOLUTE: 0 E9/L (ref 0–0.2)
BASOPHILS RELATIVE PERCENT: 0 % (ref 0–2)
BILIRUB SERPL-MCNC: 0.5 MG/DL (ref 0–1.2)
BILIRUBIN URINE: NEGATIVE
BLOOD BANK DISPENSE STATUS: NORMAL
BLOOD BANK PRODUCT CODE: NORMAL
BLOOD, URINE: ABNORMAL
BPU ID: NORMAL
BUN BLDV-MCNC: 18 MG/DL (ref 8–23)
CALCIUM SERPL-MCNC: 9.3 MG/DL (ref 8.6–10.2)
CHLORIDE BLD-SCNC: 99 MMOL/L (ref 98–107)
CHP ED QC CHECK: NORMAL
CLARITY: CLEAR
CO2: 21 MMOL/L (ref 22–29)
COLOR: YELLOW
CREAT SERPL-MCNC: 0.8 MG/DL (ref 0.7–1.2)
DAT POLYSPECIFIC: NORMAL
DESCRIPTION BLOOD BANK: NORMAL
EKG ATRIAL RATE: 82 BPM
EKG P AXIS: 36 DEGREES
EKG P-R INTERVAL: 176 MS
EKG Q-T INTERVAL: 370 MS
EKG QRS DURATION: 68 MS
EKG QTC CALCULATION (BAZETT): 432 MS
EKG R AXIS: 30 DEGREES
EKG T AXIS: 32 DEGREES
EKG VENTRICULAR RATE: 82 BPM
EOSINOPHILS ABSOLUTE: 0 E9/L (ref 0.05–0.5)
EOSINOPHILS RELATIVE PERCENT: 0 % (ref 0–6)
GFR AFRICAN AMERICAN: >60
GFR NON-AFRICAN AMERICAN: >60 ML/MIN/1.73
GLUCOSE BLD-MCNC: 122 MG/DL (ref 74–99)
GLUCOSE URINE: NEGATIVE MG/DL
HCT VFR BLD CALC: 21.9 % (ref 37–54)
HCT VFR BLD CALC: 22 % (ref 37–54)
HCT VFR BLD CALC: 23.7 % (ref 37–54)
HEMOCCULT STL QL: NEGATIVE
HEMOGLOBIN: 6.5 G/DL (ref 12.5–16.5)
HEMOGLOBIN: 6.7 G/DL (ref 12.5–16.5)
IMMATURE GRANULOCYTES #: 0.04 E9/L
IMMATURE GRANULOCYTES %: 1.1 % (ref 0–5)
IMMATURE RETIC FRACT: 18.6 % (ref 2.3–13.4)
INR BLD: 1.2
KETONES, URINE: NEGATIVE MG/DL
LEUKOCYTE ESTERASE, URINE: NEGATIVE
LIPASE: 155 U/L (ref 13–60)
LYMPHOCYTES ABSOLUTE: 0.83 E9/L (ref 1.5–4)
LYMPHOCYTES RELATIVE PERCENT: 22.9 % (ref 20–42)
MCH RBC QN AUTO: 29.5 PG (ref 26–35)
MCHC RBC AUTO-ENTMCNC: 28.3 % (ref 32–34.5)
MCV RBC AUTO: 104.4 FL (ref 80–99.9)
MONOCYTES ABSOLUTE: 0.9 E9/L (ref 0.1–0.95)
MONOCYTES RELATIVE PERCENT: 24.8 % (ref 2–12)
NEUTROPHILS ABSOLUTE: 1.86 E9/L (ref 1.8–7.3)
NEUTROPHILS RELATIVE PERCENT: 51.2 % (ref 43–80)
NITRITE, URINE: NEGATIVE
PDW BLD-RTO: 20.4 FL (ref 11.5–15)
PH UA: 5.5 (ref 5–9)
PLATELET # BLD: 95 E9/L (ref 130–450)
PLATELET CONFIRMATION: NORMAL
PMV BLD AUTO: 11.4 FL (ref 7–12)
POTASSIUM REFLEX MAGNESIUM: 4 MMOL/L (ref 3.5–5)
PRO-BNP: 3663 PG/ML (ref 0–450)
PROTEIN UA: NEGATIVE MG/DL
PROTHROMBIN TIME: 13.9 SEC (ref 9.3–12.4)
RBC # BLD: 2.27 E12/L (ref 3.8–5.8)
RBC UA: ABNORMAL /HPF (ref 0–2)
RETIC HGB EQUIVALENT: 28.3 PG (ref 28.2–36.6)
RETICULOCYTE ABSOLUTE COUNT: 0.09 E12/L
RETICULOCYTE COUNT PCT: 4 % (ref 0.4–1.9)
SODIUM BLD-SCNC: 134 MMOL/L (ref 132–146)
SPECIFIC GRAVITY UA: 1.01 (ref 1–1.03)
TOTAL PROTEIN: 8.5 G/DL (ref 6.4–8.3)
TROPONIN: 0.04 NG/ML (ref 0–0.03)
UROBILINOGEN, URINE: 0.2 E.U./DL
WBC # BLD: 3.6 E9/L (ref 4.5–11.5)
WBC UA: ABNORMAL /HPF (ref 0–5)

## 2020-04-24 PROCEDURE — 83540 ASSAY OF IRON: CPT

## 2020-04-24 PROCEDURE — 86901 BLOOD TYPING SEROLOGIC RH(D): CPT

## 2020-04-24 PROCEDURE — P9040 RBC LEUKOREDUCED IRRADIATED: HCPCS

## 2020-04-24 PROCEDURE — 36430 TRANSFUSION BLD/BLD COMPNT: CPT

## 2020-04-24 PROCEDURE — 82728 ASSAY OF FERRITIN: CPT

## 2020-04-24 PROCEDURE — 85014 HEMATOCRIT: CPT

## 2020-04-24 PROCEDURE — 83010 ASSAY OF HAPTOGLOBIN QUANT: CPT

## 2020-04-24 PROCEDURE — 86900 BLOOD TYPING SEROLOGIC ABO: CPT

## 2020-04-24 PROCEDURE — P9016 RBC LEUKOCYTES REDUCED: HCPCS

## 2020-04-24 PROCEDURE — 84466 ASSAY OF TRANSFERRIN: CPT

## 2020-04-24 PROCEDURE — 2580000003 HC RX 258: Performed by: FAMILY MEDICINE

## 2020-04-24 PROCEDURE — 84484 ASSAY OF TROPONIN QUANT: CPT

## 2020-04-24 PROCEDURE — 71045 X-RAY EXAM CHEST 1 VIEW: CPT

## 2020-04-24 PROCEDURE — 6360000002 HC RX W HCPCS: Performed by: EMERGENCY MEDICINE

## 2020-04-24 PROCEDURE — 94760 N-INVAS EAR/PLS OXIMETRY 1: CPT

## 2020-04-24 PROCEDURE — 2060000000 HC ICU INTERMEDIATE R&B

## 2020-04-24 PROCEDURE — 99285 EMERGENCY DEPT VISIT HI MDM: CPT

## 2020-04-24 PROCEDURE — 81001 URINALYSIS AUTO W/SCOPE: CPT

## 2020-04-24 PROCEDURE — 86923 COMPATIBILITY TEST ELECTRIC: CPT

## 2020-04-24 PROCEDURE — 80053 COMPREHEN METABOLIC PANEL: CPT

## 2020-04-24 PROCEDURE — 6370000000 HC RX 637 (ALT 250 FOR IP): Performed by: FAMILY MEDICINE

## 2020-04-24 PROCEDURE — 93010 ELECTROCARDIOGRAM REPORT: CPT | Performed by: INTERNAL MEDICINE

## 2020-04-24 PROCEDURE — 86850 RBC ANTIBODY SCREEN: CPT

## 2020-04-24 PROCEDURE — 83690 ASSAY OF LIPASE: CPT

## 2020-04-24 PROCEDURE — 96374 THER/PROPH/DIAG INJ IV PUSH: CPT

## 2020-04-24 PROCEDURE — 85025 COMPLETE CBC W/AUTO DIFF WBC: CPT

## 2020-04-24 PROCEDURE — 83550 IRON BINDING TEST: CPT

## 2020-04-24 PROCEDURE — 83880 ASSAY OF NATRIURETIC PEPTIDE: CPT

## 2020-04-24 PROCEDURE — 2580000003 HC RX 258: Performed by: EMERGENCY MEDICINE

## 2020-04-24 PROCEDURE — 93005 ELECTROCARDIOGRAM TRACING: CPT | Performed by: EMERGENCY MEDICINE

## 2020-04-24 PROCEDURE — 85045 AUTOMATED RETICULOCYTE COUNT: CPT

## 2020-04-24 PROCEDURE — 85018 HEMOGLOBIN: CPT

## 2020-04-24 PROCEDURE — 85610 PROTHROMBIN TIME: CPT

## 2020-04-24 PROCEDURE — 86880 COOMBS TEST DIRECT: CPT

## 2020-04-24 PROCEDURE — 36415 COLL VENOUS BLD VENIPUNCTURE: CPT

## 2020-04-24 RX ORDER — FUROSEMIDE 10 MG/ML
20 INJECTION INTRAMUSCULAR; INTRAVENOUS ONCE
Status: COMPLETED | OUTPATIENT
Start: 2020-04-24 | End: 2020-04-24

## 2020-04-24 RX ORDER — DOXAZOSIN 2 MG/1
2 TABLET ORAL NIGHTLY
Status: DISCONTINUED | OUTPATIENT
Start: 2020-04-24 | End: 2020-04-28 | Stop reason: HOSPADM

## 2020-04-24 RX ORDER — TERAZOSIN 1 MG/1
2 CAPSULE ORAL NIGHTLY
Status: DISCONTINUED | OUTPATIENT
Start: 2020-04-24 | End: 2020-04-24 | Stop reason: CLARIF

## 2020-04-24 RX ORDER — ACETAMINOPHEN 325 MG/1
650 TABLET ORAL EVERY 6 HOURS PRN
Status: DISCONTINUED | OUTPATIENT
Start: 2020-04-24 | End: 2020-04-28 | Stop reason: HOSPADM

## 2020-04-24 RX ORDER — SODIUM CHLORIDE 0.9 % (FLUSH) 0.9 %
10 SYRINGE (ML) INJECTION EVERY 12 HOURS SCHEDULED
Status: DISCONTINUED | OUTPATIENT
Start: 2020-04-24 | End: 2020-04-28 | Stop reason: HOSPADM

## 2020-04-24 RX ORDER — 0.9 % SODIUM CHLORIDE 0.9 %
250 INTRAVENOUS SOLUTION INTRAVENOUS ONCE
Status: COMPLETED | OUTPATIENT
Start: 2020-04-24 | End: 2020-04-25

## 2020-04-24 RX ORDER — LISINOPRIL 10 MG/1
10 TABLET ORAL DAILY
Status: DISCONTINUED | OUTPATIENT
Start: 2020-04-24 | End: 2020-04-25

## 2020-04-24 RX ORDER — ACETAMINOPHEN 650 MG/1
650 SUPPOSITORY RECTAL EVERY 6 HOURS PRN
Status: DISCONTINUED | OUTPATIENT
Start: 2020-04-24 | End: 2020-04-28 | Stop reason: HOSPADM

## 2020-04-24 RX ORDER — SODIUM CHLORIDE 0.9 % (FLUSH) 0.9 %
10 SYRINGE (ML) INJECTION PRN
Status: DISCONTINUED | OUTPATIENT
Start: 2020-04-24 | End: 2020-04-28 | Stop reason: HOSPADM

## 2020-04-24 RX ORDER — DOCUSATE SODIUM 100 MG/1
100 CAPSULE, LIQUID FILLED ORAL 2 TIMES DAILY PRN
Status: DISCONTINUED | OUTPATIENT
Start: 2020-04-24 | End: 2020-04-28 | Stop reason: HOSPADM

## 2020-04-24 RX ORDER — LATANOPROST 50 UG/ML
1 SOLUTION/ DROPS OPHTHALMIC NIGHTLY
Status: DISCONTINUED | OUTPATIENT
Start: 2020-04-24 | End: 2020-04-28 | Stop reason: HOSPADM

## 2020-04-24 RX ORDER — POLYETHYLENE GLYCOL 3350 17 G/17G
17 POWDER, FOR SOLUTION ORAL DAILY PRN
Status: DISCONTINUED | OUTPATIENT
Start: 2020-04-24 | End: 2020-04-28 | Stop reason: HOSPADM

## 2020-04-24 RX ORDER — SODIUM CHLORIDE 9 MG/ML
250 INJECTION, SOLUTION INTRAVENOUS ONCE
Status: COMPLETED | OUTPATIENT
Start: 2020-04-24 | End: 2020-04-25

## 2020-04-24 RX ADMIN — LISINOPRIL 10 MG: 10 TABLET ORAL at 18:47

## 2020-04-24 RX ADMIN — FUROSEMIDE 20 MG: 10 INJECTION, SOLUTION INTRAMUSCULAR; INTRAVENOUS at 16:16

## 2020-04-24 RX ADMIN — DOXAZOSIN 2 MG: 2 TABLET ORAL at 20:48

## 2020-04-24 RX ADMIN — SODIUM CHLORIDE 250 ML: 9 INJECTION, SOLUTION INTRAVENOUS at 22:11

## 2020-04-24 RX ADMIN — SODIUM CHLORIDE, PRESERVATIVE FREE 10 ML: 5 INJECTION INTRAVENOUS at 20:48

## 2020-04-24 RX ADMIN — LATANOPROST 1 DROP: 50 SOLUTION OPHTHALMIC at 20:48

## 2020-04-24 ASSESSMENT — ENCOUNTER SYMPTOMS
NAUSEA: 0
DOUBLE VISION: 0
SHORTNESS OF BREATH: 1
VOMITING: 0
SINUS PRESSURE: 0
BACK PAIN: 0
EYE PAIN: 0
EYE REDNESS: 0
SORE THROAT: 0
ABDOMINAL PAIN: 0
EYE DISCHARGE: 0
BLOOD IN STOOL: 0
DIARRHEA: 0
COUGH: 0
RHINORRHEA: 0
CONSTIPATION: 0
WHEEZING: 0

## 2020-04-24 ASSESSMENT — PAIN SCALES - GENERAL
PAINLEVEL_OUTOF10: 0
PAINLEVEL_OUTOF10: 0

## 2020-04-24 ASSESSMENT — VISUAL ACUITY: OU: 1

## 2020-04-24 NOTE — PROGRESS NOTES
Admit Date: 4/24/2020      Subjective:     Patient notes fatigue, JEFF x 21-2 wks. No CP. No palpitations. No melena or hematochezia. Objective:   No intake/output data recorded. No intake/output data recorded. BP (!) 155/75   Pulse 73   Temp 97.6 °F (36.4 °C)   Resp 16   Ht 5' 4\" (1.626 m)   Wt 150 lb (68 kg)   SpO2 97%   BMI 25.75 kg/m²     CARDIAC MONITOR: N/A    GENERAL APPEARANCE:  Alert, cooperative and in no acute distress. HEENT:   PERRLA, EOMI, Nonicteric sclera. Oral mucosa moist without lesions. Nares patent. NECK:    No thyromegaly, cervical LA, or carotid bruits. No JVD. LUNGS:    CTA bilaterally. No rales or wheeze. HEART:   Regular rate and rhythm, S1, S2 normal, no MRG. ABDOMEN:    Soft, non-tender, normal BS. No masses or HSM. BACK:    No CVAT  NEURO:   A&O x 3. CN II-XII, motor, sensory, & DTR's grossly intact. EXTREMITIES:  Symmetrical without clubbing or cyanosis. 1+ edema of bilateral LE from knees down. SKIN:    Nonicteric with normal turgor. No rash. RECTAL:   Per ER physician - no mass. , Stool guaiac neg.     LABS:  CBC with Differential:    Lab Results   Component Value Date    WBC 3.6 04/24/2020    HGB 6.7 04/24/2020    HCT 23.7 04/24/2020    PLT 95 04/24/2020     BMP:    Lab Results   Component Value Date     04/24/2020    K 4.0 04/24/2020    CL 99 04/24/2020    CO2 21 04/24/2020    BUN 18 04/24/2020    CREATININE 0.8 04/24/2020    CALCIUM 9.3 04/24/2020    LABGLOM >60 04/24/2020     PT/INR:    Lab Results   Component Value Date    PROTIME 13.9 04/24/2020    INR 1.2 04/24/2020     Lab Results   Component Value Date    GLUCOSE 122 04/24/2020    GLUCOSE 109 04/22/2020    GLUCOSE 98 03/30/2020       MEDS:    Scheduled Meds:   sodium chloride  250 mL Intravenous Once     Continuous Infusions:  PRN Meds:      Assessment:     Principal Problem:    Anemia - etiology uncertain (loss vs failure to produce) - already receiving blood before I could order any hematologic

## 2020-04-24 NOTE — FLOWSHEET NOTE
Mrs. Diaz called and was upset ans concerned that her  would not be treated with respect. She did state that when he was here recently there were 2 aides that were rude and told patient not to bother them during the night and he was in extreme pain. She said she will follow closely and will not tolerate this kind of behavior. I assured her we would take great care of her .      Electronically signed by Kiersten Tamez RN on 4/24/2020 at 6:53 PM

## 2020-04-24 NOTE — ED PROVIDER NOTES
Patient is an 80years old male who states that [facility] called him to let him know that his hemoglobin was low around 6 and that to go to ER. In addition, patient complains of shortness of breath worse with exertion for the past few weeks along with swelling in both of his legs for the past 1.5 weeks. Patient denies taking anything for his symptoms. He denies chest pain/pressure, cough, fever, chills, sweats, falls, injuries, lightheadedness, dizziness, headache, neck pain/stiffness, change in vision/hearing, numbness/tingling, focal weakness, nausea/vomiting, abdominal pain, diarrhea, constipation, blood in stool or urine, burning with urination, urinary frequency changes, dark stools, recent travel, or sick contacts/contact with anyone positive for COVID-19. Illness    Pertinent negatives include no fever, no decreased vision, no double vision, no abdominal pain, no constipation, no diarrhea, no nausea, no vomiting, no ear pain, no headaches, no hearing loss, no rhinorrhea, no sore throat, no neck pain, no neck stiffness, no cough, no wheezing, no rash, no eye discharge, no eye pain and no eye redness. Review of Systems   Constitutional: Positive for fatigue. Negative for chills, diaphoresis and fever. HENT: Negative for ear pain, hearing loss, rhinorrhea, sinus pressure and sore throat. Eyes: Negative for double vision, pain, discharge, redness and visual disturbance. Respiratory: Positive for shortness of breath. Negative for cough and wheezing. Cardiovascular: Positive for leg swelling. Negative for chest pain. Gastrointestinal: Negative for abdominal pain, blood in stool, constipation, diarrhea, nausea and vomiting. Genitourinary: Negative for dysuria, flank pain, frequency and hematuria. Musculoskeletal: Negative for arthralgias, back pain, neck pain and neck stiffness. Skin: Negative for rash and wound.    Neurological: Negative for dizziness, syncope, weakness, light-headedness, numbness and headaches. Hematological: Negative for adenopathy. All other systems reviewed and are negative. Physical Exam  Vitals signs and nursing note reviewed. Constitutional:       Appearance: He is well-developed. HENT:      Head: Normocephalic and atraumatic. No raccoon eyes or Vivar's sign. Nose: Nose normal.      Mouth/Throat:      Mouth: Mucous membranes are moist.      Pharynx: Oropharynx is clear. Uvula midline. Eyes:      General: Lids are normal. Vision grossly intact. Extraocular Movements: Extraocular movements intact. Conjunctiva/sclera: Conjunctivae normal.      Pupils: Pupils are equal, round, and reactive to light. Neck:      Musculoskeletal: Normal range of motion and neck supple. Vascular: No JVD. Trachea: Trachea normal.   Cardiovascular:      Rate and Rhythm: Normal rate and regular rhythm. Pulses:           Radial pulses are 2+ on the right side and 2+ on the left side. Posterior tibial pulses are 2+ on the right side and 2+ on the left side. Heart sounds: Normal heart sounds. Pulmonary:      Effort: Pulmonary effort is normal. No respiratory distress. Breath sounds: Normal breath sounds. No wheezing or rales. Abdominal:      General: Bowel sounds are normal.      Palpations: Abdomen is soft. Tenderness: There is no abdominal tenderness. There is no right CVA tenderness, left CVA tenderness, guarding or rebound. Genitourinary:     Rectum: Guaiac result negative. Musculoskeletal:      Comments: 2+ pitting edema BLEs without calf tenderness to palpation. Skin:     General: Skin is warm and dry. Neurological:      Mental Status: He is alert and oriented to person, place, and time. Cranial Nerves: Cranial nerves are intact. No cranial nerve deficit. Sensory: Sensation is intact. Motor: Motor function is intact.       Coordination: Coordination normal.          Procedures     MDM  Number of Diagnoses or Management Options  Anemia, unspecified type:   Dyspnea, unspecified type: Fatigue, unspecified type:   Heart failure, unspecified HF chronicity, unspecified heart failure type [facility]): Thrombocytopenia [facility]):   Diagnosis management comments: Patient's work-up is remarkable for anemia and he has been fatigued with some dyspnea with exertion. In addition, patient's work-up is remarkable for heart failure. Patient is Hemoccult negative. He is started on treatments for his conditions. He is admitted for continued work-up, treatment, and monitoring of his conditions. EKG: This EKG is signed and interpreted by me. Rate: 82  Rhythm: Sinus  Interpretation: non-specific EKG  Comparison: stable as compared to patient's most recent EKG            --------------------------------------------- PAST HISTORY ---------------------------------------------  Past Medical History:  has a past medical history of Abnormal brain MRI, Episode of syncope, GI bleed, and Hypertension. Past Surgical History:  has a past surgical history that includes hernia repair; joint replacement (Bilateral); Carpal tunnel release (Bilateral); eye surgery; and back surgery. Social History:  reports that he quit smoking about 40 years ago. His smoking use included cigarettes. He has quit using smokeless tobacco. He reports current alcohol use of about 2.0 standard drinks of alcohol per week. He reports that he does not use drugs. Family History: family history includes Cancer in his father; Heart Attack in his brother. The patients home medications have been reviewed. Allergies: Patient has no known allergies.     -------------------------------------------------- RESULTS -------------------------------------------------    LABS:  Results for orders placed or performed during the hospital encounter of 04/24/20   Troponin   Result Value Ref Range    Troponin 0.04 (H) 0.00 - 0.03 ng/mL   Brain Natriuretic Peptide Ventricular Rate 82 BPM    Atrial Rate 82 BPM    P-R Interval 176 ms    QRS Duration 68 ms    Q-T Interval 370 ms    QTc Calculation (Bazett) 432 ms    P Axis 36 degrees    R Axis 30 degrees    T Axis 32 degrees   TYPE AND SCREEN   Result Value Ref Range    ABO/Rh O NEG     Antibody Screen NEG    PREPARE RBC (CROSSMATCH), 1 Units   Result Value Ref Range    Product Code Blood Bank [id number]     Description Blood Bank Red Blood Cells, Irradiated, Leuko-reduced     Unit Number [id number]     Dispense Status Blood Bank selected        RADIOLOGY:  XR CHEST PORTABLE   Final Result   Residual infiltrate and/or atelectasis at the right base with overall   improved aeration since 29 March 2020.                 ------------------------- NURSING NOTES AND VITALS REVIEWED ---------------------------  Date / Time Roomed:  4/24/2020  1:01 PM  ED Bed Assignment:  25/25    The nursing notes within the ED encounter and vital signs as below have been reviewed. Patient Vitals for the past 24 hrs:   BP Temp Pulse Resp SpO2 Height Weight   04/24/20 1522 (!) 134/57 -- 67 16 97 % -- --   04/24/20 1421 (!) 145/63 -- 79 16 94 % -- --   04/24/20 1259 -- -- -- -- -- 5' 4\" (1.626 m) 150 lb (68 kg)   04/24/20 1258 (!) 159/70 -- -- -- -- -- --   04/24/20 1252 (!) 146/66 97.8 °F (36.6 °C) 93 18 98 % -- --       Oxygen Saturation Interpretation: Normal    ------------------------------------------ PROGRESS NOTES ------------------------------------------  Re-evaluation(s):  Time: 2:43 PM  Patients symptoms show no change  Repeat physical examination is not changed    Counseling:  I have spoken with the patient and discussed todays results, in addition to providing specific details for the plan of care and counseling regarding the diagnosis and prognosis.   Their questions are answered at this time and they are agreeable with the plan of admission.    --------------------------------- ADDITIONAL PROVIDER NOTES ---------------------------------  Consultations:  Spoke with Dr. Bg Manuel. Discussed case. They will admit the patient. This patient's ED course included: a personal history and physicial examination, multiple bedside re-evaluations, IV medications, cardiac monitoring and continuous pulse oximetry    This patient has remained hemodynamically stable during their ED course. Diagnosis:  1. Anemia, unspecified type    2. Heart failure, unspecified HF chronicity, unspecified heart failure type ([facility] [address]    3. Fatigue, unspecified type    4. Thrombocytopenia ([address]    5. Dyspnea, unspecified type        Disposition:  Patient's disposition: Admit to telemetry  Patient's condition is stable.          Claudio Haynes DO  Resident  04/24/20 5721

## 2020-04-25 ENCOUNTER — ANESTHESIA EVENT (OUTPATIENT)
Dept: OPERATING ROOM | Age: 85
DRG: 393 | End: 2020-04-25
Payer: MEDICARE

## 2020-04-25 LAB
ANION GAP SERPL CALCULATED.3IONS-SCNC: 11 MMOL/L (ref 7–16)
ANISOCYTOSIS: ABNORMAL
BASOPHILS ABSOLUTE: 0 E9/L (ref 0–0.2)
BASOPHILS RELATIVE PERCENT: 0 % (ref 0–2)
BLOOD BANK DISPENSE STATUS: NORMAL
BLOOD BANK DISPENSE STATUS: NORMAL
BLOOD BANK PRODUCT CODE: NORMAL
BLOOD BANK PRODUCT CODE: NORMAL
BPU ID: NORMAL
BPU ID: NORMAL
BUN BLDV-MCNC: 17 MG/DL (ref 8–23)
CALCIUM SERPL-MCNC: 8.8 MG/DL (ref 8.6–10.2)
CHLORIDE BLD-SCNC: 100 MMOL/L (ref 98–107)
CO2: 22 MMOL/L (ref 22–29)
CREAT SERPL-MCNC: 0.8 MG/DL (ref 0.7–1.2)
DESCRIPTION BLOOD BANK: NORMAL
DESCRIPTION BLOOD BANK: NORMAL
EOSINOPHILS ABSOLUTE: 0 E9/L (ref 0.05–0.5)
EOSINOPHILS RELATIVE PERCENT: 0 % (ref 0–6)
FERRITIN: 1049 NG/ML
GFR AFRICAN AMERICAN: >60
GFR NON-AFRICAN AMERICAN: >60 ML/MIN/1.73
GLUCOSE BLD-MCNC: 89 MG/DL (ref 74–99)
HAPTOGLOBIN: <10 MG/DL (ref 30–200)
HCT VFR BLD CALC: 26.8 % (ref 37–54)
HCT VFR BLD CALC: 30.2 % (ref 37–54)
HCT VFR BLD CALC: 35.1 % (ref 37–54)
HEMOGLOBIN: 11.1 G/DL (ref 12.5–16.5)
HEMOGLOBIN: 8.4 G/DL (ref 12.5–16.5)
HEMOGLOBIN: 9.4 G/DL (ref 12.5–16.5)
IRON SATURATION: 31 % (ref 20–55)
IRON: 52 MCG/DL (ref 59–158)
LACTATE DEHYDROGENASE: 649 U/L (ref 135–225)
LYMPHOCYTES ABSOLUTE: 0.69 E9/L (ref 1.5–4)
LYMPHOCYTES RELATIVE PERCENT: 14.9 % (ref 20–42)
MCH RBC QN AUTO: 29.3 PG (ref 26–35)
MCHC RBC AUTO-ENTMCNC: 31.1 % (ref 32–34.5)
MCV RBC AUTO: 94.1 FL (ref 80–99.9)
MONOCYTES ABSOLUTE: 0.83 E9/L (ref 0.1–0.95)
MONOCYTES RELATIVE PERCENT: 18.4 % (ref 2–12)
NEUTROPHILS ABSOLUTE: 3.08 E9/L (ref 1.8–7.3)
NEUTROPHILS RELATIVE PERCENT: 66.7 % (ref 43–80)
NUCLEATED RED BLOOD CELLS: 0 /100 WBC
PDW BLD-RTO: 20.6 FL (ref 11.5–15)
PLATELET # BLD: 95 E9/L (ref 130–450)
PLATELET CONFIRMATION: NORMAL
PMV BLD AUTO: 12.2 FL (ref 7–12)
POIKILOCYTES: ABNORMAL
POLYCHROMASIA: ABNORMAL
POTASSIUM SERPL-SCNC: 3.8 MMOL/L (ref 3.5–5)
RBC # BLD: 3.21 E12/L (ref 3.8–5.8)
SCHISTOCYTES: ABNORMAL
SODIUM BLD-SCNC: 133 MMOL/L (ref 132–146)
TOTAL IRON BINDING CAPACITY: 170 MCG/DL (ref 250–450)
TRANSFERRIN: 144 MG/DL (ref 200–360)
TROPONIN: 0.04 NG/ML (ref 0–0.03)
TROPONIN: 0.05 NG/ML (ref 0–0.03)
WBC # BLD: 4.6 E9/L (ref 4.5–11.5)

## 2020-04-25 PROCEDURE — 97161 PT EVAL LOW COMPLEX 20 MIN: CPT

## 2020-04-25 PROCEDURE — 83615 LACTATE (LD) (LDH) ENZYME: CPT

## 2020-04-25 PROCEDURE — 99232 SBSQ HOSP IP/OBS MODERATE 35: CPT | Performed by: NURSE PRACTITIONER

## 2020-04-25 PROCEDURE — 84484 ASSAY OF TROPONIN QUANT: CPT

## 2020-04-25 PROCEDURE — 36415 COLL VENOUS BLD VENIPUNCTURE: CPT

## 2020-04-25 PROCEDURE — 2580000003 HC RX 258: Performed by: INTERNAL MEDICINE

## 2020-04-25 PROCEDURE — 2580000003 HC RX 258: Performed by: FAMILY MEDICINE

## 2020-04-25 PROCEDURE — 36430 TRANSFUSION BLD/BLD COMPNT: CPT

## 2020-04-25 PROCEDURE — 6360000002 HC RX W HCPCS: Performed by: NURSE PRACTITIONER

## 2020-04-25 PROCEDURE — APPSS180 APP SPLIT SHARED TIME > 60 MINUTES: Performed by: NURSE PRACTITIONER

## 2020-04-25 PROCEDURE — 2060000000 HC ICU INTERMEDIATE R&B

## 2020-04-25 PROCEDURE — 85014 HEMATOCRIT: CPT

## 2020-04-25 PROCEDURE — 85025 COMPLETE CBC W/AUTO DIFF WBC: CPT

## 2020-04-25 PROCEDURE — 6370000000 HC RX 637 (ALT 250 FOR IP): Performed by: FAMILY MEDICINE

## 2020-04-25 PROCEDURE — 99222 1ST HOSP IP/OBS MODERATE 55: CPT | Performed by: INTERNAL MEDICINE

## 2020-04-25 PROCEDURE — 6370000000 HC RX 637 (ALT 250 FOR IP): Performed by: SURGERY

## 2020-04-25 PROCEDURE — 85018 HEMOGLOBIN: CPT

## 2020-04-25 PROCEDURE — 80048 BASIC METABOLIC PNL TOTAL CA: CPT

## 2020-04-25 PROCEDURE — P9016 RBC LEUKOCYTES REDUCED: HCPCS

## 2020-04-25 RX ORDER — FUROSEMIDE 10 MG/ML
20 INJECTION INTRAMUSCULAR; INTRAVENOUS 2 TIMES DAILY
Status: DISCONTINUED | OUTPATIENT
Start: 2020-04-25 | End: 2020-04-26

## 2020-04-25 RX ORDER — LISINOPRIL 20 MG/1
20 TABLET ORAL DAILY
Status: DISCONTINUED | OUTPATIENT
Start: 2020-04-26 | End: 2020-04-28 | Stop reason: HOSPADM

## 2020-04-25 RX ADMIN — ACETAMINOPHEN 650 MG: 325 TABLET ORAL at 08:29

## 2020-04-25 RX ADMIN — BISACODYL 10 MG: 5 TABLET, COATED ORAL at 11:53

## 2020-04-25 RX ADMIN — LISINOPRIL 10 MG: 10 TABLET ORAL at 08:29

## 2020-04-25 RX ADMIN — SODIUM CHLORIDE, PRESERVATIVE FREE 10 ML: 5 INJECTION INTRAVENOUS at 08:29

## 2020-04-25 RX ADMIN — FUROSEMIDE 20 MG: 10 INJECTION, SOLUTION INTRAMUSCULAR; INTRAVENOUS at 09:42

## 2020-04-25 RX ADMIN — FUROSEMIDE 20 MG: 10 INJECTION, SOLUTION INTRAMUSCULAR; INTRAVENOUS at 18:44

## 2020-04-25 RX ADMIN — MAGNESIUM CITRATE 600 ML: 1.75 LIQUID ORAL at 17:19

## 2020-04-25 RX ADMIN — SODIUM CHLORIDE 250 ML: 9 INJECTION, SOLUTION INTRAVENOUS at 00:00

## 2020-04-25 RX ADMIN — LATANOPROST 1 DROP: 50 SOLUTION OPHTHALMIC at 20:43

## 2020-04-25 RX ADMIN — MAGNESIUM CITRATE 600 ML: 1.75 LIQUID ORAL at 13:43

## 2020-04-25 RX ADMIN — BISACODYL 10 MG: 5 TABLET, COATED ORAL at 17:18

## 2020-04-25 RX ADMIN — SODIUM CHLORIDE, PRESERVATIVE FREE 10 ML: 5 INJECTION INTRAVENOUS at 20:43

## 2020-04-25 RX ADMIN — DOXAZOSIN 2 MG: 2 TABLET ORAL at 20:43

## 2020-04-25 ASSESSMENT — PAIN DESCRIPTION - DESCRIPTORS: DESCRIPTORS: ACHING;DISCOMFORT

## 2020-04-25 ASSESSMENT — PAIN SCALES - GENERAL
PAINLEVEL_OUTOF10: 8
PAINLEVEL_OUTOF10: 0
PAINLEVEL_OUTOF10: 0
PAINLEVEL_OUTOF10: 2
PAINLEVEL_OUTOF10: 0

## 2020-04-25 ASSESSMENT — PAIN DESCRIPTION - LOCATION: LOCATION: KNEE

## 2020-04-25 ASSESSMENT — PAIN DESCRIPTION - ORIENTATION: ORIENTATION: RIGHT

## 2020-04-25 ASSESSMENT — PAIN DESCRIPTION - PAIN TYPE: TYPE: CHRONIC PAIN

## 2020-04-25 NOTE — H&P
[address] [address]                              HISTORY AND PHYSICAL    PATIENT NAME: Alyssa Akers                   :        03/10/1931  MED REC NO:   [id number]                            ROOM:       [id number]  ACCOUNT NO:   [de-identified]                           ADMIT DATE: 2020  PROVIDER:     Nataliya Arredondo MD    CHIEF COMPLAINT:  Increasing fatigue and lower extremity swelling  bilaterally over the past one to two weeks. HISTORY OF PRESENT ILLNESS:  This is one of several hospitalizations for  this 26-year-old gentleman who had most recently been in the hospital at  [facility] in late 2020. His admission at that  time was for pneumonia. The patient, apparently, had been home and  doing well since that time until roughly one to two weeks prior to this  admission when he began noticing increasing dyspnea on exertion and  increasing swelling in bilateral lower extremities. He was having no  trouble with lying flat, no problems with orthopnea, paroxysmal  nocturnal dyspnea, chest pain, pressure or tightness. There were no  palpitations. No lightheadedness, no night sweats. No unexplained  weight loss. No melena or hematochezia. No nausea or vomiting. The  patient states that along with the above symptoms, he had blood work  done at the [facility] and was called on the day of admission and told  that his hemoglobin was low and he should go directly to the emergency  room. In the ER, his hemoglobin is 6.7. In light of all the above  symptoms and his significant anemia, the patient is being admitted for  further evaluation and treatment. PAST MEDICAL HISTORY:  1. Benign prostatic hypertrophy diagnosed in . 2.  Hypertension. 3.  Hyperlipidemia diagnosed in .  4.  History of diverticulosis noted on colonoscopy in .   5.  Severe spinal stenosis at L4-5 noted on an

## 2020-04-25 NOTE — ANESTHESIA PRE PROCEDURE
Department of Anesthesiology  Preprocedure Note       Name:  Maryann Garcia   Age:  80 y.o.  :  3/10/1931                                          MRN:  [id number]         Date:  2020      Surgeon: Judson Rodriguez):  Aden Payan MD    Procedure: EGD ESOPHAGOGASTRODUODENOSCOPY (N/A )  COLONOSCOPY DIAGNOSTIC (N/A )    Medications prior to admission:   Prior to Admission medications    Medication Sig Start Date End Date Taking?  Authorizing Provider   aspirin EC 81 MG EC tablet Take 1 tablet by mouth daily 3/31/20  Yes Nabil Jiang MD   simvastatin (ZOCOR) 20 MG tablet Take 1 tablet by mouth nightly 3/31/20  Yes Nabil Jiang MD   ferrous sulfate (IRON 325) 325 (65 Fe) MG tablet Take 1 tablet by mouth 2 times daily (with meals) 3/31/20  Yes Nabil Jiang MD   terazosin (HYTRIN) 2 MG capsule Take 2 mg by mouth nightly   Yes Historical Provider, MD   latanoprost (XALATAN) 0.005 % ophthalmic solution Place 1 drop into the right eye nightly   Yes Historical Provider, MD   docusate sodium (COLACE) 100 MG capsule Take 100 mg by mouth 2 times daily as needed for Constipation   Yes Historical Provider, MD   lisinopril (PRINIVIL;ZESTRIL) 10 MG tablet Take 10 mg by mouth daily   Yes Historical Provider, MD       Current medications:    Current Facility-Administered Medications   Medication Dose Route Frequency Provider Last Rate Last Dose    furosemide (LASIX) injection 20 mg  20 mg Intravenous BID DARRYL Oh CNP   20 mg at 20 9441    [START ON 2020] lisinopril (PRINIVIL;ZESTRIL) tablet 20 mg  20 mg Oral Daily DARRYL Oh CNP        magnesium citrate solution 600 mL  600 mL Oral Q4H Aden Payan MD        bisacodyl (DULCOLAX) EC tablet 10 mg  10 mg Oral Q4H Aden Payan MD   10 mg at 20 1153    sodium chloride flush 0.9 % injection 10 mL  10 mL Intravenous 2 times per day Diogenes Mckeon MD   10 mL at 20 0829    sodium chloride

## 2020-04-25 NOTE — CONSULTS
[facility]  Dr. Mo Primer      Patient Name: Iva Moses  YOB: 1931  PCP: No primary care provider on file. Referring Provider:      Reason for Consultation:   Chief Complaint   Patient presents with    Anemia     pt sent in by pcp for low hgb, hgb 6.0         History of Present Illness: This pt is an 81 yo male, known to me from a prior admission in 3/20 which we were consulted to see him for anemia of uncertain etiology. During that admission,it was felt he was having GI bleeding, though NM Bleeding scan was negative and no bleeding was overtly identified. This was due to his continued Hgb drop and large amount of pRBC requirements during the admission. His ESR and ferritin were both elevated, suggesting a component of AOCD. He also had PNA and was treated with abx. 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 He was placed on abx and his platelet count improved during the course of his admission. He is now admitted with increasing SOB and JEFF, along with increasing BL LE edema. He has been started on lasix. On admission, Hgb was 6.8 with .8. He has been transfused a total of 3 units pRBC and Hgb improved to 8.4 this AM. Hematology has been consulted for further evaluation of this anemia      Diagnostic Data:     Past Medical History:   Diagnosis Date    Abnormal brain MRI 03/26/2020    Episode of syncope 03/25/2020    GI bleed     FOBT negative 3/29/20    Hypertension        Patient Active Problem List    Diagnosis Date Noted    Peripheral edema 04/24/2020    Arthritis 04/24/2020    Grade I diastolic dysfunction Cardiac exho 3-.  EF 55% 04/24/2020    Gross hematuria 03/30/2020    Acute ischemic stroke (HCC)     Acute blood loss anemia     Syncope     Aspiration pneumonia ([facility] [address]     Pneumonia due to organism 03/20/2020    Anemia 03/20/2020    HTN (hypertension)

## 2020-04-25 NOTE — HOME CARE
Patient current with [facility] for SN,PT/OT. Will need JEFFREY orders if appropriate at discharge. Venkatesh Mathew LPN  [facility].

## 2020-04-25 NOTE — CONSULTS
Inpatient Cardiology Consultation      Reason for Consult: Acute HFpEF    Consulting Physician: Dr. Herminio Serrato    Requesting Physician:  Dr. Elgin Crigler     Date of Consultation: 4/25/2020    HISTORY OF PRESENT ILLNESS:     Mr. Sailaja Vaz is a 80year old male with no previously known to [facility]. He has a PMHx of hypertension, hyperlipidemia, BPH, thrombocytopenia, diverticulosis and severe spinal stenosis. He presented to [facility] ED on 4/24/2020 as directed by Candido Yoon for abnormal labs (low hemoglobin). During evaluation he reported increased shortness of breath and lower extremity edema that had been progressing for the past week. He denies any hematochezia, melena, or hematuria. Of note he was admitted to the hospital 3/2020 for pneumonia, during that hospitalization he was anemic with thrombocytopenia. He received a total of 8 units pRBCs during the hospitalization, underwent NM bleeding scan that was negative, and he refused endoscopy. He also had a syncopal episode and was seen by neurology. 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 He reports dyspnea with exertion, shortness of breath, denies decline in overall functional capacity. He denies orthopnea, PND, nocturnal cough or hemoptysis. He denies abdominal distention or bloating, early satiety, anorexia/change in appetite, unintentional weight loss. He does lower extremity edema. He denies exertional lightheadedness. He denies palpitations, syncope or near syncope. Review of systems is negative for chest pain, pressure, discomfort. When ambulating on an incline, He does not leg claudication. History is negative for neurological symptoms including transient loss of vision, asymmetric weakness, aphasia, dysphasia, numbness, tingling. Past medical, surgical, family and social histories have been reviewed.  Any changes in the past medical history, social history or family history have been made and are making:  _______________________________________________________________________    25-year-old gentleman not previously followed by cardiology, recent admission for pneumonia and significant anemia requiring 8 units PRBCs, declined endoscopic evaluation at the time, negative nuclear bleeding scan. Apparently had a syncopal episode during that hospitalization, MRI was obtained which showed acute to subacute infarcts in the periventricular white matter. EF showed normal function. Has been doing well since discharge, states over past 7 to 10 days worsening lower extremity edema and dyspnea with exertion. Denies any chest pain. Was sent in for evaluation because of abnormal labs, again found to be significantly anemic with a hemoglobin of 6.8. He is received 3 units PRBCs. He has again been seen by surgery, and is now scheduled for endoscopy and colonoscopy tomorrow. FOBT negative, no obvious bleeding. proBNP elevated 3600, troponin mildly elevated 0.04 -> 0.05. Got 20 of IV furosemide so far, net -1 L. Physical Exam:  BP (!) 159/74   Pulse 81   Temp 98.2 °F (36.8 °C) (Oral)   Resp 18   Ht 5' 4\" (1.626 m)   Wt 151 lb 8 oz (68.7 kg)   SpO2 93%   BMI 26.00 kg/m²   General appearance: Elderly gentleman, awake, alert, no acute respiratory distress  Skin: Intact, no rash  ENMT: Moist mucous membranes  Neck: Supple, no carotid bruits. Mildly elevated jugular venous pressure, with positive hepatojugular reflux  Lungs: Bibasilar rales  Cardiac: Regular rhythm with a normal rate, normal S1&S2, no murmurs apparent  Abdomen: Soft, positive bowel sounds, nontender  Extremities: Moves all extremities x 4, 2+ pitting bilateral lower extremity edema  Neurologic: No focal motor deficits apparent, normal mood and affect    Telemetry: Sinus rhythm  EKG: Sinus rhythm 82 bpm.  Normal axis and intervals. Probable prior septal infarct. No ST-T wave changes. Impression:   1. Acute on chronic HFpEF  2.  Mild troponin elevation -likely demand ischemia in the setting of heart failure and anemia  3. Acute on chronic anemia, unclear etiology. Hgb 6.8 -> 9.4 s/p 3u PRBCs  4. Hypertension, poorly controlled  5. Comorbid disease: Thrombocytopenia (platelets 95), hyperlipidemia, BPH, severe spinal stenosis, acute/subacute small infarcts periventricular white matter 3/2020    Recommendations:     Continue IV diuresis   Caution with diuresis as he will be receiving bowel prep for colonoscopy   Optimize antihypertensives   Aggressive risk factor modification   No immediate plans for ischemic evaluation given concurrent anemia issues   Anemia evaluation in progress   No contraindication from cardiology standpoint to proceed with endoscopic evaluation    Thank you for the consultation. Please do not hesitate to call with questions.     Kinza Ashton MD  [facility]) Cardiology

## 2020-04-26 ENCOUNTER — APPOINTMENT (OUTPATIENT)
Dept: ULTRASOUND IMAGING | Age: 85
DRG: 393 | End: 2020-04-26
Payer: MEDICARE

## 2020-04-26 ENCOUNTER — ANESTHESIA (OUTPATIENT)
Dept: OPERATING ROOM | Age: 85
DRG: 393 | End: 2020-04-26
Payer: MEDICARE

## 2020-04-26 VITALS — DIASTOLIC BLOOD PRESSURE: 72 MMHG | OXYGEN SATURATION: 95 % | SYSTOLIC BLOOD PRESSURE: 125 MMHG

## 2020-04-26 LAB
ANION GAP SERPL CALCULATED.3IONS-SCNC: 14 MMOL/L (ref 7–16)
BUN BLDV-MCNC: 18 MG/DL (ref 8–23)
CALCIUM SERPL-MCNC: 9.3 MG/DL (ref 8.6–10.2)
CHLORIDE BLD-SCNC: 97 MMOL/L (ref 98–107)
CO2: 21 MMOL/L (ref 22–29)
CREAT SERPL-MCNC: 0.9 MG/DL (ref 0.7–1.2)
GFR AFRICAN AMERICAN: >60
GFR NON-AFRICAN AMERICAN: >60 ML/MIN/1.73
GLUCOSE BLD-MCNC: 115 MG/DL (ref 74–99)
HCT VFR BLD CALC: 30.6 % (ref 37–54)
HCT VFR BLD CALC: 32 % (ref 37–54)
HEMOGLOBIN: 10.1 G/DL (ref 12.5–16.5)
HEMOGLOBIN: 9.4 G/DL (ref 12.5–16.5)
MAGNESIUM: 2.5 MG/DL (ref 1.6–2.6)
POTASSIUM SERPL-SCNC: 3.3 MMOL/L (ref 3.5–5)
PRO-BNP: 5450 PG/ML (ref 0–450)
SODIUM BLD-SCNC: 132 MMOL/L (ref 132–146)

## 2020-04-26 PROCEDURE — 2709999900 HC NON-CHARGEABLE SUPPLY: Performed by: SURGERY

## 2020-04-26 PROCEDURE — 83880 ASSAY OF NATRIURETIC PEPTIDE: CPT

## 2020-04-26 PROCEDURE — 99233 SBSQ HOSP IP/OBS HIGH 50: CPT | Performed by: INTERNAL MEDICINE

## 2020-04-26 PROCEDURE — 99232 SBSQ HOSP IP/OBS MODERATE 35: CPT | Performed by: NURSE PRACTITIONER

## 2020-04-26 PROCEDURE — 2060000000 HC ICU INTERMEDIATE R&B

## 2020-04-26 PROCEDURE — 76705 ECHO EXAM OF ABDOMEN: CPT

## 2020-04-26 PROCEDURE — 6360000002 HC RX W HCPCS: Performed by: NURSE ANESTHETIST, CERTIFIED REGISTERED

## 2020-04-26 PROCEDURE — 83735 ASSAY OF MAGNESIUM: CPT

## 2020-04-26 PROCEDURE — 6360000002 HC RX W HCPCS: Performed by: SURGERY

## 2020-04-26 PROCEDURE — 3700000000 HC ANESTHESIA ATTENDED CARE: Performed by: SURGERY

## 2020-04-26 PROCEDURE — 6370000000 HC RX 637 (ALT 250 FOR IP): Performed by: SURGERY

## 2020-04-26 PROCEDURE — 2580000003 HC RX 258: Performed by: NURSE ANESTHETIST, CERTIFIED REGISTERED

## 2020-04-26 PROCEDURE — 3600007511: Performed by: SURGERY

## 2020-04-26 PROCEDURE — 0DBN8ZX EXCISION OF SIGMOID COLON, VIA NATURAL OR ARTIFICIAL OPENING ENDOSCOPIC, DIAGNOSTIC: ICD-10-PCS | Performed by: SURGERY

## 2020-04-26 PROCEDURE — 3700000001 HC ADD 15 MINUTES (ANESTHESIA): Performed by: SURGERY

## 2020-04-26 PROCEDURE — 80048 BASIC METABOLIC PNL TOTAL CA: CPT

## 2020-04-26 PROCEDURE — 0DB68ZX EXCISION OF STOMACH, VIA NATURAL OR ARTIFICIAL OPENING ENDOSCOPIC, DIAGNOSTIC: ICD-10-PCS | Performed by: SURGERY

## 2020-04-26 PROCEDURE — 85014 HEMATOCRIT: CPT

## 2020-04-26 PROCEDURE — 88342 IMHCHEM/IMCYTCHM 1ST ANTB: CPT

## 2020-04-26 PROCEDURE — 97165 OT EVAL LOW COMPLEX 30 MIN: CPT

## 2020-04-26 PROCEDURE — 2500000003 HC RX 250 WO HCPCS: Performed by: NURSE ANESTHETIST, CERTIFIED REGISTERED

## 2020-04-26 PROCEDURE — 36415 COLL VENOUS BLD VENIPUNCTURE: CPT

## 2020-04-26 PROCEDURE — 88305 TISSUE EXAM BY PATHOLOGIST: CPT

## 2020-04-26 PROCEDURE — 3600007501: Performed by: SURGERY

## 2020-04-26 PROCEDURE — 85018 HEMOGLOBIN: CPT

## 2020-04-26 PROCEDURE — 2580000003 HC RX 258: Performed by: SURGERY

## 2020-04-26 RX ORDER — PANTOPRAZOLE SODIUM 40 MG/1
40 TABLET, DELAYED RELEASE ORAL
Status: DISCONTINUED | OUTPATIENT
Start: 2020-04-27 | End: 2020-04-28 | Stop reason: HOSPADM

## 2020-04-26 RX ORDER — FUROSEMIDE 10 MG/ML
40 INJECTION INTRAMUSCULAR; INTRAVENOUS 2 TIMES DAILY
Status: DISCONTINUED | OUTPATIENT
Start: 2020-04-26 | End: 2020-04-28

## 2020-04-26 RX ORDER — LIDOCAINE HYDROCHLORIDE 20 MG/ML
INJECTION, SOLUTION EPIDURAL; INFILTRATION; INTRACAUDAL; PERINEURAL PRN
Status: DISCONTINUED | OUTPATIENT
Start: 2020-04-26 | End: 2020-04-26 | Stop reason: SDUPTHER

## 2020-04-26 RX ORDER — PROPOFOL 10 MG/ML
INJECTION, EMULSION INTRAVENOUS PRN
Status: DISCONTINUED | OUTPATIENT
Start: 2020-04-26 | End: 2020-04-26 | Stop reason: SDUPTHER

## 2020-04-26 RX ORDER — FUROSEMIDE 10 MG/ML
20 INJECTION INTRAMUSCULAR; INTRAVENOUS 2 TIMES DAILY
Status: DISCONTINUED | OUTPATIENT
Start: 2020-04-26 | End: 2020-04-26

## 2020-04-26 RX ORDER — SODIUM CHLORIDE, SODIUM LACTATE, POTASSIUM CHLORIDE, CALCIUM CHLORIDE 600; 310; 30; 20 MG/100ML; MG/100ML; MG/100ML; MG/100ML
INJECTION, SOLUTION INTRAVENOUS CONTINUOUS PRN
Status: DISCONTINUED | OUTPATIENT
Start: 2020-04-26 | End: 2020-04-26 | Stop reason: SDUPTHER

## 2020-04-26 RX ORDER — POTASSIUM CHLORIDE 20 MEQ/1
20 TABLET, EXTENDED RELEASE ORAL ONCE
Status: COMPLETED | OUTPATIENT
Start: 2020-04-26 | End: 2020-04-26

## 2020-04-26 RX ADMIN — DOXAZOSIN 2 MG: 2 TABLET ORAL at 21:25

## 2020-04-26 RX ADMIN — POTASSIUM CHLORIDE 20 MEQ: 20 TABLET, EXTENDED RELEASE ORAL at 10:04

## 2020-04-26 RX ADMIN — LIDOCAINE HYDROCHLORIDE 40 MG: 20 INJECTION, SOLUTION EPIDURAL; INFILTRATION; INTRACAUDAL; PERINEURAL at 08:18

## 2020-04-26 RX ADMIN — LATANOPROST 1 DROP: 50 SOLUTION OPHTHALMIC at 21:25

## 2020-04-26 RX ADMIN — FUROSEMIDE 40 MG: 10 INJECTION, SOLUTION INTRAMUSCULAR; INTRAVENOUS at 17:35

## 2020-04-26 RX ADMIN — PROPOFOL 50 MG: 10 INJECTION, EMULSION INTRAVENOUS at 08:18

## 2020-04-26 RX ADMIN — PROPOFOL 20 MG: 10 INJECTION, EMULSION INTRAVENOUS at 08:29

## 2020-04-26 RX ADMIN — PROPOFOL 30 MG: 10 INJECTION, EMULSION INTRAVENOUS at 08:21

## 2020-04-26 RX ADMIN — ACETAMINOPHEN 650 MG: 325 TABLET ORAL at 10:04

## 2020-04-26 RX ADMIN — PROPOFOL 50 MG: 10 INJECTION, EMULSION INTRAVENOUS at 08:24

## 2020-04-26 RX ADMIN — SODIUM CHLORIDE, PRESERVATIVE FREE 10 ML: 5 INJECTION INTRAVENOUS at 21:00

## 2020-04-26 RX ADMIN — SODIUM CHLORIDE, POTASSIUM CHLORIDE, SODIUM LACTATE AND CALCIUM CHLORIDE: 600; 310; 30; 20 INJECTION, SOLUTION INTRAVENOUS at 08:09

## 2020-04-26 RX ADMIN — FUROSEMIDE 40 MG: 10 INJECTION, SOLUTION INTRAMUSCULAR; INTRAVENOUS at 11:46

## 2020-04-26 RX ADMIN — LISINOPRIL 20 MG: 20 TABLET ORAL at 10:04

## 2020-04-26 ASSESSMENT — PULMONARY FUNCTION TESTS
PIF_VALUE: 0
PIF_VALUE: 1
PIF_VALUE: 0

## 2020-04-26 ASSESSMENT — PAIN SCALES - GENERAL
PAINLEVEL_OUTOF10: 0
PAINLEVEL_OUTOF10: 3
PAINLEVEL_OUTOF10: 0

## 2020-04-26 ASSESSMENT — PAIN SCALES - WONG BAKER: WONGBAKER_NUMERICALRESPONSE: 0

## 2020-04-26 NOTE — OP NOTE
Dayanara Pleitez  YOB: 1931  [id number]    Pre-operative Diagnosis:  Anemia     Post-operative Diagnosis: Gastritis with superficial antral ulcers, LA class B esophagitis, hiatal hernia, colitis of sigmoid probably ischemic    Procedure: EGD with biopsies, colonoscopy with biopsies    Anesthesia: LMAC    Surgeon: Kerri Gann MD    Assistant: None    Complications: none    Specimens: antrum, sigmoid colon    EBL: none    Procedure:  Pt was taken to the endoscopy suite and placed on the endoscopy table in a left lateral decubitus position. LMAC anesthesia was administered and a bite block was inserted. A lubricated gastroscope was inserted into the oropharynx and advanced into the esophagus. The esophagus was inspected throughout its length. There were no varices. There was distal esophagitis, LA class B.  GE junction was approximately 40 cm. The stomach was entered and insufflated. The antrum was mildly inflamed. There was a superficial antral ulcer, nonbleeding. Biopsies were taken for H Pylori. The pylorus was intubated. The first and second portions of the duodenum were normal.  The scope was pulled back into the antrum and retroflexed. The angle of the stomach was normal.  The proximal greater and lesser curves were normal.  The fundus was normal.  At the GE junction, there was a small type I hiatal hernia. The stomach was deflated and the scope was withdrawn and removed. Next, a digital rectal examination revealed no gross blood, normal tone, no mass was palpated. A lubricated colonoscope was inserted and advanced. As I was navigating through the sigmoid, there was significant edema and inflammation. There was evidence of necrosis of the mucosa, suggestive of ischemic colitis. I was able to safely navigate through these areas to the cecum. The ileocecal valve and appendiceal orifice were identified and photographed. Prep was good.  Cecum, ascending colon, hepatic flexure,

## 2020-04-26 NOTE — PROGRESS NOTES
INPATIENT CARDIOLOGY FOLLOW-UP    Name: Irvin Zaragoza    Age: 80 y.o. Date of Admission: 4/24/2020  1:01 PM    Date of Service: 4/26/2020    Primary Cardiologist: Albaro Marrero, known to me from this admission    Chief Complaint: Follow-up for HFpEF, elevated troponin    Interim History:  Patient underwent colonoscopy prep overnight, states had diarrhea all night. Currently on bedpan this morning. Denies chest pain or shortness of breath. He is lying flat    Urine output 1.2 L, net -740 mL 24 hours, total net -1.7 L    Review of Systems:   Negative except as described above    Problem List:  Patient Active Problem List   Diagnosis    Pneumonia due to organism    Anemia    HTN (hypertension)    Acute kidney injury ([facility] [address]    Acute hyperglycemia    Community acquired pneumonia    Aspiration pneumonia ([facility] [address]    Syncope    Acute blood loss anemia    Acute ischemic stroke ([facility] [address]    Gross hematuria    Peripheral edema    Arthritis    Grade I diastolic dysfunction Cardiac exho 3-.  EF 55%       Current Medications:    Current Facility-Administered Medications:     furosemide (LASIX) injection 20 mg, 20 mg, Intravenous, BID, Elysia Blakely MD    lisinopril (PRINIVIL;ZESTRIL) tablet 20 mg, 20 mg, Oral, Daily, Jacqueline Lucero, APRN - CNP    sodium chloride flush 0.9 % injection 10 mL, 10 mL, Intravenous, 2 times per day, Alyson Woodward MD, 10 mL at 04/25/20 2043    sodium chloride flush 0.9 % injection 10 mL, 10 mL, Intravenous, PRN, Alyson Woodward MD    acetaminophen (TYLENOL) tablet 650 mg, 650 mg, Oral, Q6H PRN, 650 mg at 04/25/20 0829 **OR** acetaminophen (TYLENOL) suppository 650 mg, 650 mg, Rectal, Q6H PRN, Alyson Woodward MD    polyethylene glycol [facility]) packet 17 g, 17 g, Oral, Daily PRN, Alyson Woodward MD    latanoprost (XALATAN) 0.005 % ophthalmic solution 1 drop, 1 drop, Right Eye, Nightly, Alyson Woodward MD, 1 drop at 04/25/20 2043    docusate

## 2020-04-26 NOTE — PROGRESS NOTES
Occupational Therapy  OCCUPATIONAL THERAPY INITIAL EVALUATION      Date:2020  Patient Name: Bailey Harp  MRN: [id number]  : 3/10/1931  Room: [address] [place] [address]-A    Referring Provider:  Ray Ackerman MD    Evaluating OT: Larissa Norman OTR/L [id number]    AM-PAC Daily Activity Raw Score:     Recommended Adaptive Equipment: TBD     Diagnosis: Anemia    Pertinent Medical History: HTN,    Precautions:  Falls,      Home Living: Pt lives with wife and son. 2 story with bed/bath on 2nd -with stair glide. 3 steps/rail  to enter . Walk in shower with seat   Equipment owned: walker, rollator   Prior Level of Function: assist  with ADLs , assist  with IADLs; ambulated walker or rollator   Driving: no  Occupation: retired orthodontist     Pain Level: no pain this session ;   Cognition:  Ox3, alert and conversing     Judgement/safety:  Good               Memory WFL     Functional Assessment:   Initial Eval Status  Date: 20 Tx Session  STGs = LTGs  1-2 weeks    Feeding Independent      Grooming SBA/set-up,seared   Supervision    UB Dressing Min A  SBA   LB Dressing Max A   Mod A    Bathing      Toileting Assist with thorough hygiene      Bed Mobility  Mod A  Supine to sit      Functional Transfers Mod  A  Sit-stand from bed     CGA   Functional Mobility Min A,w/walker   Steps from bed to chair   SBA with good tolerance    Balance Sitting:     Static:  Independent     Standing: Min A     Activity Tolerance Fair   Fair +  with ADL activity   Visual/  Perceptual Glasses: none by bedside                 Right  hand dominant    Strength ROM Additional Info:    RUE  3+/5  WFL good  and wfl FMC/dexterity noted during ADL tasks       LUE 3+/5  WFL good  and wfl FMC/dexterity noted during ADL tasks         Hearing: [facility]   Sensation:  No c/o numbness or tingling     Comments:   Upon arrival, patient lying in bed .   At end of session, patient sitting in chair  with call light and phone within reach, all lines and tubes POC/Goals      Kb Lara OTR/L [id number]

## 2020-04-26 NOTE — PLAN OF CARE
Problem: Falls - Risk of:  Goal: Will remain free from falls  Description: Will remain free from falls  4/26/2020 0208 by Karyle Render, RN  Outcome: Met This Shift  4/25/2020 1836 by Deirdre Haile RN  Outcome: Met This Shift  Goal: Absence of physical injury  Description: Absence of physical injury  4/25/2020 1836 by Deirdre Haile RN  Outcome: Met This Shift     Problem:  Activity:  Goal: Ability to tolerate increased activity will improve  Description: Ability to tolerate increased activity will improve  4/25/2020 1836 by Deirdre Haile RN  Outcome: Not Met This Shift  Goal: Ability to maintain optimal joint mobility will improve  Description: Ability to maintain optimal joint mobility will improve  4/25/2020 1836 by Deirdre Haile RN  Outcome: Not Met This Shift

## 2020-04-27 PROBLEM — R93.3 ABNORMAL COLONOSCOPY: Status: ACTIVE | Noted: 2020-04-27

## 2020-04-27 PROBLEM — R19.8 ABNORMAL FINDINGS ON ESOPHAGOGASTRODUODENOSCOPY (EGD): Status: ACTIVE | Noted: 2020-04-27

## 2020-04-27 PROBLEM — D69.6 THROMBOCYTOPENIA (HCC): Status: ACTIVE | Noted: 2020-04-27

## 2020-04-27 PROBLEM — E87.6 HYPOKALEMIA: Status: ACTIVE | Noted: 2020-04-27

## 2020-04-27 LAB
ANION GAP SERPL CALCULATED.3IONS-SCNC: 12 MMOL/L (ref 7–16)
BUN BLDV-MCNC: 22 MG/DL (ref 8–23)
CALCIUM SERPL-MCNC: 9 MG/DL (ref 8.6–10.2)
CHLORIDE BLD-SCNC: 98 MMOL/L (ref 98–107)
CO2: 24 MMOL/L (ref 22–29)
CREAT SERPL-MCNC: 1 MG/DL (ref 0.7–1.2)
GFR AFRICAN AMERICAN: >60
GFR NON-AFRICAN AMERICAN: >60 ML/MIN/1.73
GLUCOSE BLD-MCNC: 120 MG/DL (ref 74–99)
HCT VFR BLD CALC: 28.3 % (ref 37–54)
HCT VFR BLD CALC: 28.5 % (ref 37–54)
HEMOGLOBIN: 8.7 G/DL (ref 12.5–16.5)
HEMOGLOBIN: 8.8 G/DL (ref 12.5–16.5)
PATHOLOGIST REVIEW: NORMAL
POTASSIUM SERPL-SCNC: 3.2 MMOL/L (ref 3.5–5)
SODIUM BLD-SCNC: 134 MMOL/L (ref 132–146)

## 2020-04-27 PROCEDURE — 36415 COLL VENOUS BLD VENIPUNCTURE: CPT

## 2020-04-27 PROCEDURE — 2580000003 HC RX 258: Performed by: SURGERY

## 2020-04-27 PROCEDURE — 97110 THERAPEUTIC EXERCISES: CPT

## 2020-04-27 PROCEDURE — 6370000000 HC RX 637 (ALT 250 FOR IP): Performed by: SURGERY

## 2020-04-27 PROCEDURE — 80048 BASIC METABOLIC PNL TOTAL CA: CPT

## 2020-04-27 PROCEDURE — 97530 THERAPEUTIC ACTIVITIES: CPT

## 2020-04-27 PROCEDURE — 85018 HEMOGLOBIN: CPT

## 2020-04-27 PROCEDURE — 85014 HEMATOCRIT: CPT

## 2020-04-27 PROCEDURE — 6370000000 HC RX 637 (ALT 250 FOR IP): Performed by: FAMILY MEDICINE

## 2020-04-27 PROCEDURE — 6360000002 HC RX W HCPCS: Performed by: SURGERY

## 2020-04-27 PROCEDURE — 2060000000 HC ICU INTERMEDIATE R&B

## 2020-04-27 PROCEDURE — 99233 SBSQ HOSP IP/OBS HIGH 50: CPT | Performed by: INTERNAL MEDICINE

## 2020-04-27 RX ORDER — POTASSIUM CHLORIDE 20 MEQ/1
20 TABLET, EXTENDED RELEASE ORAL 2 TIMES DAILY WITH MEALS
Status: DISCONTINUED | OUTPATIENT
Start: 2020-04-27 | End: 2020-04-28 | Stop reason: HOSPADM

## 2020-04-27 RX ADMIN — LISINOPRIL 20 MG: 20 TABLET ORAL at 08:19

## 2020-04-27 RX ADMIN — PANTOPRAZOLE SODIUM 40 MG: 40 TABLET, DELAYED RELEASE ORAL at 06:29

## 2020-04-27 RX ADMIN — ACETAMINOPHEN 650 MG: 325 TABLET ORAL at 06:34

## 2020-04-27 RX ADMIN — POTASSIUM CHLORIDE 20 MEQ: 20 TABLET, EXTENDED RELEASE ORAL at 08:19

## 2020-04-27 RX ADMIN — FUROSEMIDE 40 MG: 10 INJECTION, SOLUTION INTRAMUSCULAR; INTRAVENOUS at 08:19

## 2020-04-27 RX ADMIN — DOXAZOSIN 2 MG: 2 TABLET ORAL at 20:01

## 2020-04-27 RX ADMIN — POTASSIUM CHLORIDE 20 MEQ: 20 TABLET, EXTENDED RELEASE ORAL at 17:39

## 2020-04-27 RX ADMIN — SODIUM CHLORIDE, PRESERVATIVE FREE 10 ML: 5 INJECTION INTRAVENOUS at 20:01

## 2020-04-27 RX ADMIN — FUROSEMIDE 40 MG: 10 INJECTION, SOLUTION INTRAMUSCULAR; INTRAVENOUS at 17:39

## 2020-04-27 RX ADMIN — LATANOPROST 1 DROP: 50 SOLUTION OPHTHALMIC at 20:01

## 2020-04-27 RX ADMIN — SODIUM CHLORIDE, PRESERVATIVE FREE 10 ML: 5 INJECTION INTRAVENOUS at 08:20

## 2020-04-27 RX ADMIN — ACETAMINOPHEN 650 MG: 325 TABLET ORAL at 20:00

## 2020-04-27 ASSESSMENT — PAIN DESCRIPTION - FREQUENCY: FREQUENCY: INTERMITTENT

## 2020-04-27 ASSESSMENT — PAIN SCALES - GENERAL
PAINLEVEL_OUTOF10: 6
PAINLEVEL_OUTOF10: 0
PAINLEVEL_OUTOF10: 3
PAINLEVEL_OUTOF10: 2
PAINLEVEL_OUTOF10: 0

## 2020-04-27 ASSESSMENT — PAIN DESCRIPTION - ORIENTATION: ORIENTATION: LEFT

## 2020-04-27 ASSESSMENT — PAIN DESCRIPTION - ONSET: ONSET: GRADUAL

## 2020-04-27 ASSESSMENT — PAIN DESCRIPTION - PAIN TYPE: TYPE: CHRONIC PAIN

## 2020-04-27 ASSESSMENT — PAIN DESCRIPTION - PROGRESSION: CLINICAL_PROGRESSION: GRADUALLY WORSENING

## 2020-04-27 ASSESSMENT — PAIN - FUNCTIONAL ASSESSMENT: PAIN_FUNCTIONAL_ASSESSMENT: PREVENTS OR INTERFERES SOME ACTIVE ACTIVITIES AND ADLS

## 2020-04-27 ASSESSMENT — PAIN DESCRIPTION - DESCRIPTORS: DESCRIPTORS: ACHING;DISCOMFORT;SORE

## 2020-04-27 ASSESSMENT — PAIN DESCRIPTION - LOCATION: LOCATION: KNEE

## 2020-04-27 NOTE — PROGRESS NOTES
exercises  Functional mobility as above. Seated exercises ankle pumps, LAQ and hip flexion    Patient education  Pt was educated on transfer technique    Patient response to education:   Pt verbalized understanding Pt demonstrated skill Pt requires further education in this area   yes With assist yes       Pt was left in chair with call light left by patient. Time in: 1025  Time out: 1048    Total treatment time 23 mintues    Pt is making good progress toward established Physical Therapy goals. Continue with physical therapy current plan of care.     [facility] PSYCHIATRY PT [id number]      CPT codes:  [] Low Complexity PT evaluation 64550  [] Moderate Complexity PT evaluation 19979  [] High Complexity PT evaluation 99725  [] PT Re-evaluation 96354  [] Gait training 61932  minutes  [] Manual therapy 45637    minutes  [x] Therapeutic activities 90945  12  minutes  [x] Therapeutic exercises 21361  11   minutes  [] Neuromuscular reeducation 70581     minutes

## 2020-04-27 NOTE — PROGRESS NOTES
Attending Physician Progress Note     Current Meds:  potassium chloride (KLOR-CON M) extended release tablet 20 mEq, BID WC  furosemide (LASIX) injection 40 mg, BID  pantoprazole (PROTONIX) tablet 40 mg, QAM AC  lisinopril (PRINIVIL;ZESTRIL) tablet 20 mg, Daily  sodium chloride flush 0.9 % injection 10 mL, 2 times per day  sodium chloride flush 0.9 % injection 10 mL, PRN  acetaminophen (TYLENOL) tablet 650 mg, Q6H PRN    Or  acetaminophen (TYLENOL) suppository 650 mg, Q6H PRN  polyethylene glycol (GLYCOLAX) packet 17 g, Daily PRN  latanoprost (XALATAN) 0.005 % ophthalmic solution 1 drop, Nightly  docusate sodium (COLACE) capsule 100 mg, BID PRN  doxazosin (CARDURA) tablet 2 mg, Nightly         Vitals/Labs:    Patient Vitals for the past 24 hrs:   BP Temp Temp src Pulse Resp SpO2 Weight   04/27/20 0634 (!) 145/62 98.4 °F (36.9 °C) Oral 77 18 95 % --   04/27/20 0419 -- -- -- -- -- -- 143 lb 6.4 oz (65 kg)   04/26/20 2115 (!) 145/73 98.1 °F (36.7 °C) Oral 80 18 95 % --   04/26/20 1520 (!) 164/72 98.6 °F (37 °C) Oral 79 18 -- --   04/26/20 1158 (!) 150/78 97.8 °F (36.6 °C) Oral 72 16 94 % --   04/26/20 1004 (!) 158/81 97.5 °F (36.4 °C) Oral 74 16 94 % --        I/O last 3 completed shifts: In: 1 [P.O.:480; I.V.:350]  Out: 725 [Urine:725]  No intake/output data recorded. Recent Labs     04/24/20  1326  04/25/20  1010 04/25/20  2200 04/26/20  0400 04/26/20  1612   WBC 3.6*  --  4.6  --   --   --    HGB 6.7*   < > 9.4* 11.1* 10.1* 9.4*   HCT 23.7*   < > 30.2* 35.1* 32.0* 30.6*   PLT 95*  --  95*  --   --   --     < > = values in this interval not displayed. Recent Labs     04/24/20  1326 04/25/20  0550 04/26/20  0400   CREATININE 0.8 0.8 0.9   BUN 18 17 18    133 132   K 4.0 3.8 3.3*   CL 99 100 97*   CO2 21* 22 21*     Recent Labs     04/24/20  1326   AST 21   ALT 8   BILITOT 0.5   ALKPHOS 94     Recent Labs     04/24/20  1326   LIPASE 155*     No results for input(s): LACTATE in the last 72 hours.   Recent Labs     04/24/20  1326   INR 1.2       Patient is feeling ok  Wants to go home  Denies abdominal pain  Tolerating diet    Physical Exam:    Abdomen:    soft and non distended.    Non-tender    Impression:  Anemia, multifactorial  Gastritis with superficial antral ulcer  Ischemic colitis    Plan:  PPI  Colitis should resolve without intervention  Discussed with primary- discharge ok from my standpoint      Electronically by Diane Whalen MD on 4/27/2020 at 8:03 AM

## 2020-04-27 NOTE — PROGRESS NOTES
Surgical History:   Procedure Laterality Date    BACK SURGERY      CARPAL TUNNEL RELEASE Bilateral     COLONOSCOPY N/A 4/26/2020    COLONOSCOPY DIAGNOSTIC performed by Bridget Pena MD at [facility] JOINT REPLACEMENT Bilateral     hips    UPPER GASTROINTESTINAL ENDOSCOPY N/A 4/26/2020    EGD ESOPHAGOGASTRODUODENOSCOPY performed by Bridget Pena MD at [facility] OR       Medications Prior to admit:  Prior to Admission medications    Medication Sig Start Date End Date Taking?  Authorizing Provider   aspirin EC 81 MG EC tablet Take 1 tablet by mouth daily 3/31/20  Yes Katie Erazo MD   simvastatin (ZOCOR) 20 MG tablet Take 1 tablet by mouth nightly 3/31/20  Yes Katie Erazo MD   ferrous sulfate (IRON 325) 325 (65 Fe) MG tablet Take 1 tablet by mouth 2 times daily (with meals) 3/31/20  Yes Katie Erazo MD   terazosin (HYTRIN) 2 MG capsule Take 2 mg by mouth nightly   Yes Historical Provider, MD   latanoprost (XALATAN) 0.005 % ophthalmic solution Place 1 drop into the right eye nightly   Yes Historical Provider, MD   docusate sodium (COLACE) 100 MG capsule Take 100 mg by mouth 2 times daily as needed for Constipation   Yes Historical Provider, MD   lisinopril (PRINIVIL;ZESTRIL) 10 MG tablet Take 10 mg by mouth daily   Yes Historical Provider, MD       Current Medications:    Current Facility-Administered Medications: potassium chloride (KLOR-CON M) extended release tablet 20 mEq, 20 mEq, Oral, BID WC  furosemide (LASIX) injection 40 mg, 40 mg, Intravenous, BID  pantoprazole (PROTONIX) tablet 40 mg, 40 mg, Oral, QAM AC  lisinopril (PRINIVIL;ZESTRIL) tablet 20 mg, 20 mg, Oral, Daily  sodium chloride flush 0.9 % injection 10 mL, 10 mL, Intravenous, 2 times per day  sodium chloride flush 0.9 % injection 10 mL, 10 mL, Intravenous, PRN  acetaminophen (TYLENOL) tablet 650 mg, 650 mg, Oral, Q6H PRN **OR** acetaminophen (TYLENOL) suppository 650 mg, 650  Cancer Father     Heart Attack Brother        PHYSICAL EXAM:   BP (!) 115/58   Pulse 87   Temp 98.6 °F (37 °C) (Oral)   Resp 18   Ht 5' 4\" (1.626 m)   Wt 143 lb 6.4 oz (65 kg)   SpO2 95%   BMI 24.61 kg/m²   CONST:  Well developed, well nourished  male who appears of stated age. Awake, alert and cooperative. No apparent distress. HEENT:   Head- Normocephalic, atraumatic   Eyes- Conjunctivae pink, anicteric  Throat- Oral mucosa pink and moist  Neck-  No stridor, trachea midline, +jugular venous distention +HJR. No carotid bruit. CHEST: Chest symmetrical and non-tender to palpation. No accessory muscle use or intercostal retractions  RESPIRATORY: Lung sounds - diminished bases otherwise clear throughout fields   CARDIOVASCULAR:     Heart Inspection- shows no noted pulsations  Heart Palpation- no heaves or thrills; PMI is non-displaced   Heart Ausculation- Regular rate and rhythm, no murmur. No s3, s4 or rub   PV: 2+ bilateral lower extremity edema. No varicosities. Pedal pulses palpable, no clubbing or cyanosis   ABDOMEN: Soft, non-tender to light palpation. Bowel sounds present. No palpable masses no organomegaly; no abdominal bruit  MS: Good muscle strength and tone. No atrophy or abnormal movements. : Deferred  SKIN: Warm and dry no statis dermatitis or ulcers   NEURO / PSYCH: Oriented to person, place and time. Speech clear and appropriate. Follows all commands. Pleasant affect     DATA:        Intake/Output Summary (Last 24 hours) at 4/27/2020 0901  Last data filed at 4/27/2020 0636  Gross per 24 hour   Intake 480 ml   Output 725 ml   Net -245 ml       Labs:   CBC:   Recent Labs     04/24/20  1326  04/25/20  1010  04/26/20  0400 04/26/20  1612   WBC 3.6*  --  4.6  --   --   --    HGB 6.7*   < > 9.4*   < > 10.1* 9.4*   HCT 23.7*   < > 30.2*   < > 32.0* 30.6*   PLT 95*  --  95*  --   --   --     < > = values in this interval not displayed.      BMP:   Recent Labs     04/25/20  0527

## 2020-04-27 NOTE — PROGRESS NOTES
Shobha and Silvestre Fernandez      Patient Name: Socorro Curtis  YOB: 1931  PCP: No primary care provider on file. Referring Provider:      Reason for Consultation:   Chief Complaint   Patient presents with    Anemia     pt sent in by pcp for low hgb, hgb 6.0         Subjective:  Feels improved. Denies overt bleeding. No abdominal pain    History of Present Illness: This pt is an 79 yo male, known to me from a prior admission in 3/20 which we were consulted to see him for anemia of uncertain etiology. During that admission,it was felt he was having GI bleeding, though NM Bleeding scan was negative and no bleeding was overtly identified. This was due to his continued Hgb drop and large amount of pRBC requirements during the admission. His ESR and ferritin were both elevated, suggesting a component of AOCD. He also had PNA and was treated with abx. 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 He was placed on abx and his platelet count improved during the course of his admission. He is now admitted with increasing SOB and JEFF, along with increasing BL LE edema. He has been started on lasix. On admission, Hgb was 6.8 with .8.  He has been transfused a total of 3 units pRBC and Hgb improved to 8.4 this AM. Hematology has been consulted for further evaluation of this anemia      Diagnostic Data:     Past Medical History:   Diagnosis Date    Abnormal brain MRI 03/26/2020    Episode of syncope 03/25/2020    GI bleed     FOBT negative 3/29/20    Hypertension        Patient Active Problem List    Diagnosis Date Noted    Thrombocytopenia ([facility] [address] 04/27/2020    Abnormal findings on EGD (4-: Fran Childs M.D.) - Gastritis w [id number] antral ulcers, LA class B esophagitis, HH 04/27/2020    Abnormal colonoscopy (4-: Fran Childs M.D.) - Sigmoid colitis, probably ischemic 04/27/2020 Historical Provider, MD   docusate sodium (COLACE) 100 MG capsule Take 100 mg by mouth 2 times daily as needed for Constipation   Yes Historical Provider, MD   lisinopril (PRINIVIL;ZESTRIL) 10 MG tablet Take 10 mg by mouth daily   Yes Historical Provider, MD       Allergies  No Known Allergies    Review of Systems:    Constitutional:  No fever chills or rigors. +fatigue  Eyes: No changes in vision, discharge, or pain  ENT: No Headaches, hearing loss or vertigo. No mouth sores or sore throat. No change in taste or smell. Cardiovascular: No chest discomfort, dyspnea on exertion, palpitations or loss of consciousness. or phlebitis. Respiratory: +SOB/JEFF  Gastrointestinal: No abdominal pain, appetite loss, blood in stools. No change in bowel habits. No hematemesis   Genitourinary: Patient acknowledges no dysuria, trouble voiding, or hematuria. No nocturia or increased frequency. Musculoskeletal: +BL LE edema  Integumentary: No rash or pruritis. Neurological: No headache, diplopia, change in muscle strength, numbness or tingling. No change in gait, balance, coordination, mood, affect, memory, mentation, behavior. Psychiatric: No anxiety, or depression. Endocrine: No temperature intolerance. No excessive thirst, fluid intake, or urination. No tremor. Hematologic/Lymphatic: No abnormal bruising or bleeding, blood clots or swollen lymph nodes. Allergic/Immunologic: No nasal congestion or hives. Objective  BP (!) 115/58   Pulse 87   Temp 98.6 °F (37 °C) (Oral)   Resp 18   Ht 5' 4\" (1.626 m)   Wt 143 lb 6.4 oz (65 kg)   SpO2 95%   BMI 24.61 kg/m²     Physical Exam:   Performance Status:  General: AAO to person, place, time, in no acute distress, pleasant   Head and neck : PERRLA, EOMI . Sclera non icteric. Oropharynx : Clear  Neck: no JVD,  no adenopathy  LYMPHATICS : No LAD  Heart: Regular rate and regular rhythm, no murmur  Lungs: Clear to auscultation   Extremities: + BL LE edema.  R elbow tophi values causing myelosuppression  - LDH on 3/30 was 342. Haptoglobin from 4/24 was <10  - Will check smear, though he has received 3 unit pRBCs at this time. No schistocytes on differential  - [place] on 3/26 was negative, making AIHA very unlikely. No increased bili  - There was no previous splenomegaly, however the liver was enlarged. Liver disease can contribute to both anemia/thrombocytopenia and a low haptoglobin. LFTs are normal  - Recheck LDH, US liver, and check TYREL/Chloe  - Renal function normal  - Thrombocytopenia is mild  - Check daily CBC  - Transfuse for Hgb <7, platelets <36  - Continue diuresis per IM  - GI bleeding remains a possibility    - Liver is normal in appearance but enlarged  - Smear without definitive causes for low counts. Likely related to hepatomegaly  - Platelets stable  - Hgb increased but trending back down again to 8.7 now  - LDH up to 649  - FOBT negative  - Scopes showing gastritis with superficial ulcer and ischemic colitis.  No acute intervention required  - OK for DC from hematology POV if Hgb stabilizes    Electronically signed by Rosario Junior MD on 4/27/2020 at 2:48 PM

## 2020-04-27 NOTE — PROGRESS NOTES
flexion/ extension requiring mod vc and demeos for correct form. Exercises were performed to improve strength during ADLs. Education: Safety training during transfers to maximize independence with ADLs. · Pt has made good progress towards set goals.    · Continue with current plan of care      Treatment Charges: Mins Units   Ther Ex  42464 14 1   Manual Therapy 09459     Thera Activities 42204 9 1   ADL/Home Mgt [id number]     Neuro Re-ed 76424     Group Therapy      Orthotic manage/training  48811     Non-Billable Time     Total Timed Treatment 23 2       [address] ALVAREZ/L [id number]

## 2020-04-28 ENCOUNTER — CARE COORDINATION (OUTPATIENT)
Dept: CASE MANAGEMENT | Age: 85
End: 2020-04-28

## 2020-04-28 VITALS
TEMPERATURE: 98.4 F | DIASTOLIC BLOOD PRESSURE: 72 MMHG | BODY MASS INDEX: 23.52 KG/M2 | HEIGHT: 64 IN | WEIGHT: 137.8 LBS | HEART RATE: 82 BPM | RESPIRATION RATE: 18 BRPM | OXYGEN SATURATION: 92 % | SYSTOLIC BLOOD PRESSURE: 140 MMHG

## 2020-04-28 LAB
ANION GAP SERPL CALCULATED.3IONS-SCNC: 12 MMOL/L (ref 7–16)
ANISOCYTOSIS: ABNORMAL
BASOPHILS ABSOLUTE: 0 E9/L (ref 0–0.2)
BASOPHILS RELATIVE PERCENT: 0 % (ref 0–2)
BUN BLDV-MCNC: 29 MG/DL (ref 8–23)
CALCIUM SERPL-MCNC: 8.8 MG/DL (ref 8.6–10.2)
CHLORIDE BLD-SCNC: 97 MMOL/L (ref 98–107)
CO2: 25 MMOL/L (ref 22–29)
CREAT SERPL-MCNC: 1.1 MG/DL (ref 0.7–1.2)
EOSINOPHILS ABSOLUTE: 0 E9/L (ref 0.05–0.5)
EOSINOPHILS RELATIVE PERCENT: 0 % (ref 0–6)
GFR AFRICAN AMERICAN: >60
GFR NON-AFRICAN AMERICAN: >60 ML/MIN/1.73
GLUCOSE BLD-MCNC: 91 MG/DL (ref 74–99)
HCT VFR BLD CALC: 26.3 % (ref 37–54)
HEMOGLOBIN: 8 G/DL (ref 12.5–16.5)
IMMATURE GRANULOCYTES #: 0.08 E9/L
IMMATURE GRANULOCYTES %: 1.5 % (ref 0–5)
LYMPHOCYTES ABSOLUTE: 1.45 E9/L (ref 1.5–4)
LYMPHOCYTES RELATIVE PERCENT: 28 % (ref 20–42)
MCH RBC QN AUTO: 30.1 PG (ref 26–35)
MCHC RBC AUTO-ENTMCNC: 30.4 % (ref 32–34.5)
MCV RBC AUTO: 98.9 FL (ref 80–99.9)
MONOCYTES ABSOLUTE: 1.83 E9/L (ref 0.1–0.95)
MONOCYTES RELATIVE PERCENT: 35.3 % (ref 2–12)
NEUTROPHILS ABSOLUTE: 1.82 E9/L (ref 1.8–7.3)
NEUTROPHILS RELATIVE PERCENT: 35.2 % (ref 43–80)
PDW BLD-RTO: 19 FL (ref 11.5–15)
PLATELET # BLD: 85 E9/L (ref 130–450)
PLATELET CONFIRMATION: NORMAL
PMV BLD AUTO: 12.1 FL (ref 7–12)
POLYCHROMASIA: ABNORMAL
POTASSIUM SERPL-SCNC: 3.5 MMOL/L (ref 3.5–5)
PRO-BNP: 2048 PG/ML (ref 0–450)
RBC # BLD: 2.66 E12/L (ref 3.8–5.8)
SODIUM BLD-SCNC: 134 MMOL/L (ref 132–146)
WBC # BLD: 5.2 E9/L (ref 4.5–11.5)

## 2020-04-28 PROCEDURE — 2580000003 HC RX 258: Performed by: SURGERY

## 2020-04-28 PROCEDURE — 36415 COLL VENOUS BLD VENIPUNCTURE: CPT

## 2020-04-28 PROCEDURE — 83880 ASSAY OF NATRIURETIC PEPTIDE: CPT

## 2020-04-28 PROCEDURE — 99233 SBSQ HOSP IP/OBS HIGH 50: CPT | Performed by: INTERNAL MEDICINE

## 2020-04-28 PROCEDURE — 97110 THERAPEUTIC EXERCISES: CPT

## 2020-04-28 PROCEDURE — 97535 SELF CARE MNGMENT TRAINING: CPT

## 2020-04-28 PROCEDURE — 97530 THERAPEUTIC ACTIVITIES: CPT

## 2020-04-28 PROCEDURE — 6370000000 HC RX 637 (ALT 250 FOR IP): Performed by: FAMILY MEDICINE

## 2020-04-28 PROCEDURE — 6360000002 HC RX W HCPCS: Performed by: SURGERY

## 2020-04-28 PROCEDURE — 6370000000 HC RX 637 (ALT 250 FOR IP): Performed by: SURGERY

## 2020-04-28 PROCEDURE — 85025 COMPLETE CBC W/AUTO DIFF WBC: CPT

## 2020-04-28 PROCEDURE — 80048 BASIC METABOLIC PNL TOTAL CA: CPT

## 2020-04-28 RX ORDER — FUROSEMIDE 40 MG/1
40 TABLET ORAL DAILY
Status: DISCONTINUED | OUTPATIENT
Start: 2020-04-29 | End: 2020-04-28 | Stop reason: HOSPADM

## 2020-04-28 RX ORDER — FUROSEMIDE 40 MG/1
40 TABLET ORAL DAILY
Qty: 60 TABLET | Refills: 3 | Status: SHIPPED | OUTPATIENT
Start: 2020-04-29 | End: 2020-04-28

## 2020-04-28 RX ORDER — POTASSIUM CHLORIDE 20 MEQ/1
20 TABLET, EXTENDED RELEASE ORAL 2 TIMES DAILY WITH MEALS
Qty: 60 TABLET | Refills: 3 | Status: SHIPPED | OUTPATIENT
Start: 2020-04-28

## 2020-04-28 RX ORDER — DOXAZOSIN 2 MG/1
2 TABLET ORAL NIGHTLY
Qty: 30 TABLET | Refills: 3 | Status: SHIPPED | OUTPATIENT
Start: 2020-04-28 | End: 2020-04-28

## 2020-04-28 RX ORDER — FUROSEMIDE 40 MG/1
40 TABLET ORAL DAILY
Qty: 60 TABLET | Refills: 3 | Status: SHIPPED | OUTPATIENT
Start: 2020-04-29 | End: 2020-05-13 | Stop reason: DRUGHIGH

## 2020-04-28 RX ORDER — POLYETHYLENE GLYCOL 3350 17 G/17G
17 POWDER, FOR SOLUTION ORAL DAILY PRN
Qty: 527 G | Refills: 1 | Status: SHIPPED | OUTPATIENT
Start: 2020-04-28 | End: 2020-04-28

## 2020-04-28 RX ORDER — DOXAZOSIN 2 MG/1
2 TABLET ORAL NIGHTLY
Qty: 30 TABLET | Refills: 3 | Status: SHIPPED | OUTPATIENT
Start: 2020-04-28

## 2020-04-28 RX ORDER — POLYETHYLENE GLYCOL 3350 17 G/17G
17 POWDER, FOR SOLUTION ORAL DAILY PRN
Qty: 527 G | Refills: 1 | Status: SHIPPED | OUTPATIENT
Start: 2020-04-28 | End: 2020-05-13

## 2020-04-28 RX ORDER — POTASSIUM CHLORIDE 20 MEQ/1
20 TABLET, EXTENDED RELEASE ORAL 2 TIMES DAILY WITH MEALS
Qty: 60 TABLET | Refills: 3 | Status: SHIPPED | OUTPATIENT
Start: 2020-04-28 | End: 2020-04-28

## 2020-04-28 RX ORDER — PANTOPRAZOLE SODIUM 40 MG/1
40 TABLET, DELAYED RELEASE ORAL
Qty: 30 TABLET | Refills: 3 | Status: SHIPPED | OUTPATIENT
Start: 2020-04-29

## 2020-04-28 RX ADMIN — SODIUM CHLORIDE, PRESERVATIVE FREE 10 ML: 5 INJECTION INTRAVENOUS at 08:10

## 2020-04-28 RX ADMIN — PANTOPRAZOLE SODIUM 40 MG: 40 TABLET, DELAYED RELEASE ORAL at 06:16

## 2020-04-28 RX ADMIN — LISINOPRIL 20 MG: 20 TABLET ORAL at 08:10

## 2020-04-28 RX ADMIN — FUROSEMIDE 40 MG: 10 INJECTION, SOLUTION INTRAMUSCULAR; INTRAVENOUS at 08:10

## 2020-04-28 RX ADMIN — POTASSIUM CHLORIDE 20 MEQ: 20 TABLET, EXTENDED RELEASE ORAL at 08:09

## 2020-04-28 ASSESSMENT — PAIN SCALES - GENERAL
PAINLEVEL_OUTOF10: 0
PAINLEVEL_OUTOF10: 0

## 2020-04-28 NOTE — PROGRESS NOTES
Physical Therapy  Facility/Department: [address] INTERNAL MEDICINE 2  Daily Treatment Note  NAME: Lubna Pineda  : 3/10/1931  MRN: [id number]    Date of Service: 2020    Patient Diagnosis(es): The primary encounter diagnosis was Anemia, unspecified type. Diagnoses of Heart failure, unspecified HF chronicity, unspecified heart failure type ([address] Fatigue, unspecified type, Thrombocytopenia ([address] and Dyspnea, unspecified type were also pertinent to this visit. has a past medical history of Abnormal brain MRI, Episode of syncope, GI bleed, and Hypertension. has a past surgical history that includes hernia repair; joint replacement (Bilateral); Carpal tunnel release (Bilateral); eye surgery; back surgery; Upper gastrointestinal endoscopy (N/A, 2020); and Colonoscopy (N/A, 2020). Evaluating Therapist: Fran Stone PT      Referring Provider:  JANET Lloyd     Room #: 407   DIAGNOSIS:  Anemia   PRECAUTIONS: falls      Social:  Pt lives with  Wife and son  in a  2  floor plan  With stair glide to second floor, 3  steps and  2 rails to enter. Prior to admission pt walked with  Ww.        Initial Evaluation  Date: 2020 Treatment  20    Short Term/ Long Term   Goals   Was pt agreeable to Eval/treatment? Yes  yes      Does pt have pain?  R knee  Mild R thigh pain     Bed Mobility  Rolling:  Nt   Supine to sit:  Nt   Sit to supine:  NT  Scooting:  Min assist in sit   Pt in a recliner chair upon arrival  NT  SBA to min assist    Transfers Sit to stand:  Max assist   Stand to sit:  Mod assist   Stand pivot:  NT   sit <> stand min assist  SBA    Ambulation    12  feet with  ww  with  Min assist  80 feet and 35 feet x 1 each using WW for support Min A for balance  100 feet with  ww  with  SBA          Stair negotiation: ascended and descended NT    NT 4  steps with  2 rail with CGA    LE ROM  Grossly WFL        LE strength  3- to 4-/ 5    4- to to 4/ 5    AM- PAC RAW score 14/24 16/24            Balance: poor dynamic using WW for support    Pt performed therapeutic exercise of the following: seated B ankle pumps AROM x 40; B LAQ's/hamstring curls with minimal manual resistance 20 reps x 2. Sit to stand transfers x 3     Patient education  Pt was educated on exercise, transfers and gait    Patient response to education:   Pt verbalized understanding Pt demonstrated skill Pt requires further education in this area   yes With repeated instruction yes     ASSESSMENT:   Comments: Nurse ok with rx. Kary slow, inconsistent and laboring, Pt unsteady throughout, required constant hands on assist for balance and safety. Pt unsafe to gait or transfer alone presently, is a fall risk without support. Treatment: Pt practiced and was instructed in the following treatment: transfers promoting UE usage to assist, gait promoting posture and staying within [employer] base of support, LE exercise promoting circulation and strengthening    Pt was left in a recliner chair with call light in reach, waffle cushion in place. Chair/bed alarm: chair alarm active    Time in 0914   Time out 0942   Total Treatment Time 28 minutes   CPT codes:     Therapeutic activities 36870 15 minutes   Therapeutic exercises 94308 13 minutes       Pt is making good progress toward established Physical Therapy goals as per increased functional mobility performed and exercise participation. Continue with physical therapy current plan of care.     Jorge RUST   License Number: [id number]

## 2020-04-28 NOTE — PROGRESS NOTES
Temp 98.4 °F (36.9 °C) (Oral)   Resp 18   Ht 5' 4\" (1.626 m)   Wt 137 lb 12.8 oz (62.5 kg)   SpO2 92%   BMI 23.65 kg/m²     Physical Exam:   Performance Status:  General: AAO to person, place, time, in no acute distress, pleasant   Head and neck : PERRLA, EOMI . Sclera non icteric. Oropharynx : Clear  Neck: no JVD,  no adenopathy  LYMPHATICS : No LAD  Heart: Regular rate and regular rhythm, no murmur  Lungs: Clear to auscultation   Extremities: + BL LE edema. 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 No focal motor or sensory deficits . Recent Laboratory Data-   Lab Results   Component Value Date    WBC 5.2 04/28/2020    HGB 8.0 (L) 04/28/2020    HCT 26.3 (L) 04/28/2020    MCV 98.9 04/28/2020    PLT 85 (L) 04/28/2020    LYMPHOPCT 28.0 04/28/2020    RBC 2.66 (L) 04/28/2020    MCH 30.1 04/28/2020    MCHC 30.4 (L) 04/28/2020    RDW 19.0 (H) 04/28/2020    NEUTOPHILPCT 35.2 (L) 04/28/2020    MONOPCT 35.3 (H) 04/28/2020    BASOPCT 0.0 04/28/2020    NEUTROABS 1.82 04/28/2020    LYMPHSABS 1.45 (L) 04/28/2020    MONOSABS 1.83 (H) 04/28/2020    EOSABS 0.00 (L) 04/28/2020    BASOSABS 0.00 04/28/2020       Lab Results   Component Value Date     04/28/2020    K 3.5 04/28/2020    CL 97 (L) 04/28/2020    CO2 25 04/28/2020    BUN 29 (H) 04/28/2020    CREATININE 1.1 04/28/2020    GLUCOSE 91 04/28/2020    CALCIUM 8.8 04/28/2020    PROT 8.5 (H) 04/24/2020    LABALBU 3.1 (L) 04/24/2020    BILITOT 0.5 04/24/2020    ALKPHOS 94 04/24/2020    AST 21 04/24/2020    ALT 8 04/24/2020    LABGLOM >60 04/28/2020    GFRAA >60 04/28/2020       Lab Results   Component Value Date    IRON 52 (L) 04/24/2020    TIBC 170 (L) 04/24/2020    FERRITIN 1,049 04/24/2020           Radiology-    US LIVER   Final Result      Hepatomegaly.    .         XR CHEST PORTABLE   Final Result   Residual infiltrate and/or atelectasis at the

## 2020-04-28 NOTE — PLAN OF CARE
Problem: Falls - Risk of:  Goal: Will remain free from falls  Description: Will remain free from falls  4/28/2020 0029 by Van Summers RN  Outcome: Met This Shift  4/27/2020 1723 by Kallie Uribe RN  Outcome: Met This Shift  Goal: Absence of physical injury  Description: Absence of physical injury  4/27/2020 1723 by Kallie Uribe RN  Outcome: Met This Shift     Problem:  Activity:  Goal: Fatigue will decrease  Description: Fatigue will decrease  4/28/2020 0029 by Van Summers RN  Outcome: Met This Shift  4/27/2020 1723 by Kallie Uribe RN  Outcome: Met This Shift     Problem: Safety:  Goal: Ability to remain free from injury will improve  Description: Ability to remain free from injury will improve  Outcome: Met This Shift     Problem: Skin Integrity:  Goal: Skin integrity will improve  Description: Skin integrity will improve  4/28/2020 0029 by Van Summers RN  Outcome: Met This Shift  4/27/2020 1723 by Kallie Uribe RN  Outcome: Not Met This Shift     Problem: Pain:  Goal: Pain level will decrease  Description: Pain level will decrease  4/27/2020 1723 by Kallie Uribe RN  Outcome: Met This Shift

## 2020-04-28 NOTE — PROGRESS NOTES
[address] TREATMENT NOTE      Date:2020  Patient Name: Deena Pretty  MRN: [id number]  : 3/10/1931  Room: [address] [place] [address]-A     Referring Claudene Abu, MD     Evaluating OT: Lali Pardo OTR/L [id number]     AM-PAC Daily Activity Raw Score:      Recommended Adaptive Equipment: TBD      Diagnosis: Anemia    Pertinent Medical History: HTN  Precautions:  Falls     Home Living: Pt lives with wife and son. Greg Lacey story with bed/bath on 2nd -with stair glide.   3 steps/rail  to enter . lGo Alvarez in shower with seat   Equipment owned: walker, rollator   Prior Level of Function: assist  with ADLs , assist  with IADLs; ambulated walker or rollator   Driving: no  Occupation: retired orthodontist      Pain Level: No c/o pain  Cognition:  A&O:  with fair+ ability to follow multi- step commands.     Functional Assessment:    Initial Eval Status  Date: 20 Tx Session 20 STGs = LTGs  1-2 weeks    Feeding Independent        Grooming SBA/set-up,seared    Supervision    UB Dressing Min A   SBA   LB Dressing Max A    Mod A    Bathing         Toileting Assist with thorough hygiene  CGA standing during toileting     Bed Mobility  Mod A  Supine to sit        Functional Transfers Mod  A  Sit-stand from bed    STS: Min A from arm chair CGA   Functional Mobility Min A,w/walker   Steps from bed to chair  CGA using ww in room  SBA with good tolerance    Balance Sitting:     Static:  Independent     Standing: Min A  Sitting: Independent  Standing: CGA     Activity Tolerance Fair  Fair  Fair +  with ADL activity         B UE were WFL during tasks. Comments: Upon arrival pt was sitting in arm chair. At end of session pt was transferred back to chair with alarm on, all lines and tubes intact and call light within reach.      Treatment: Pt performed UE theraband exercises per request.  Instructed pt on 2x10 reps of shoulder flexion, shoulder horizontal abduction, scapular retraction and elbow flexion/

## 2020-04-28 NOTE — PROGRESS NOTES
History:   Diagnosis Date    Abnormal brain MRI 2020    Episode of syncope 2020    GI bleed     FOBT negative 3/29/20    Hypertension      PAST MEDICAL HISTORY:  1. Benign prostatic hypertrophy diagnosed in . 2.  Hypertension. 3.  Hyperlipidemia diagnosed in .  4.  History of diverticulosis noted on colonoscopy in . 5.  Severe spinal stenosis at L4-5 noted on an MRI in 2002. Subsequently in 2003, the patient underwent lumbar laminectomy and  has a chronic numbness in the left foot ever since that surgery. 6. Thrombocytopenia   7. Syncopal episode and was seen by neurology (3/2020). 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     PAST SURGICAL HISTORY:  3  11years old - tonsils and adenoids removed. 2.   - right hand carpal tunnel surgery. 3.  1999 - left hand carpal tunnel surgery. 4.  2002 - repair of incarcerated right inguinal hernia by Dr. Marilyn House. 5.  2003 - lumbar laminectomy - chronic left foot numbness since surgery. 6.   - left hip replacement. 7.  2007 - right hip replacement by Dr. Edelmira Moya at [facility].  8.  History of bilateral cataract surgery. 9.  History of lacrimal duct surgery.     FAMILY HISTORY:  Sons with hypertension. Brother  of a massive MI  at age 52. Father with prostate CA and  of that at age 72. No  family history of diabetes, strokes, asthma, seizures, tuberculosis,  psychiatric problems. Mother had glaucoma, and mother  from natural  causes at 8 years of age.     SOCIAL HISTORY:  The patient was  in [address] [place]). They have  five children. 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 He has perhaps two to three cups of coffee a day, one to two glasses of  wine per week. He had smoked about a pack per week for seven years and  quit many years ago. He does not use any tobacco at this time.       Past Surgical History:    Past Surgical History:   Procedure Laterality Date    BACK SURGERY      CARPAL TUNNEL RELEASE Bilateral     COLONOSCOPY N/A 4/26/2020    COLONOSCOPY DIAGNOSTIC performed by Mariaa Chiang MD at [facility] JOINT REPLACEMENT Bilateral     hips    UPPER GASTROINTESTINAL ENDOSCOPY N/A 4/26/2020    EGD ESOPHAGOGASTRODUODENOSCOPY performed by Mariaa Chiang MD at [facility] OR       Medications Prior to admit:  Prior to Admission medications    Medication Sig Start Date End Date Taking?  Authorizing Provider   aspirin EC 81 MG EC tablet Take 1 tablet by mouth daily 3/31/20  Yes Shannan Siddiqi MD   simvastatin (ZOCOR) 20 MG tablet Take 1 tablet by mouth nightly 3/31/20  Yes Shannan Siddiqi MD   ferrous sulfate (IRON 325) 325 (65 Fe) MG tablet Take 1 tablet by mouth 2 times daily (with meals) 3/31/20  Yes Shannan Siddiqi MD   terazosin (HYTRIN) 2 MG capsule Take 2 mg by mouth nightly   Yes Historical Provider, MD   latanoprost (XALATAN) 0.005 % ophthalmic solution Place 1 drop into the right eye nightly   Yes Historical Provider, MD   docusate sodium (COLACE) 100 MG capsule Take 100 mg by mouth 2 times daily as needed for Constipation   Yes Historical Provider, MD   lisinopril (PRINIVIL;ZESTRIL) 10 MG tablet Take 10 mg by mouth daily   Yes Historical Provider, MD       Current Medications:    Current Facility-Administered Medications: potassium chloride (KLOR-CON M) extended release tablet 20 mEq, 20 mEq, Oral, BID WC  furosemide (LASIX) injection 40 mg, 40 mg, Intravenous, BID  pantoprazole (PROTONIX) tablet 40 mg, 40 mg, Oral, QAM AC  lisinopril (PRINIVIL;ZESTRIL) tablet 20 mg, 20 mg, Oral, Daily  sodium chloride flush 0.9 % injection 10 mL, 10 mL, Intravenous, 2 times per day  sodium chloride flush 0.9 % injection 10 mL, 10 mL, Intravenous, PRN  acetaminophen (TYLENOL) tablet 650 mg, 650 mg, Oral, Q6H PRN **OR** acetaminophen (TYLENOL) suppository 650 mg, 650 diuretic today (I/O's net negative 2.4 L)   Replace K again   Echocardiogram images reviewed   Optimize antihypertensives --> BP control improved   Noninvasive cardiac management   Telemetry reviewed (SR)   Anemia evaluation in progress; PRBC's PRN    Felecia Lainez MD  [facility] [place] ([facility]) Cardiology

## 2020-04-28 NOTE — PROGRESS NOTES
chloride flush, acetaminophen **OR** acetaminophen, polyethylene glycol, docusate sodium      Assessment:     Principal Problem:    Anemia    Grade I diastolic dysfunction, EF 40%. (Cardiac echo 3-)    Thrombocytopenia (HCC)    Abnormal findings on EGD (4-: Claudeen Sandifer, M.D.) - Gastritis w [id number] antral ulcers, LA class B esophagitis, HH    Abnormal colonoscopy (4-: Claudeen Sandifer, M.D.) - Sigmoid colitis, probably ischemic    Hypokalemia  Active Problems:    HTN (hypertension)    Peripheral edema    Arthritis      Plan:       Continue same plan and orders. Hb has dropped from 8.8 yesterday to 8.0 this am.  Anticipate DC home once Hb stablizes.     Hayley Echevarria  4/28/2020  7:28 AM

## 2020-04-29 ENCOUNTER — CARE COORDINATION (OUTPATIENT)
Dept: CASE MANAGEMENT | Age: 85
End: 2020-04-29

## 2020-04-29 ENCOUNTER — TELEPHONE (OUTPATIENT)
Dept: CARDIOLOGY CLINIC | Age: 85
End: 2020-04-29

## 2020-04-29 NOTE — CARE COORDINATION
[address] Transitions Initial Follow Up Call    Call within 2 business days of discharge: Yes    Patient: Maryann Garcia Patient : 3/10/1931   MRN: [id number]  Reason for Admission: anemia  Discharge Date: 20 RARS: Readmission Risk Score: 20      Last Discharge [facility]       Complaint Diagnosis Description Type Department Provider    20 Anemia Anemia, unspecified type . .. ED to Hosp-Admission (Discharged) (ADMITTED) Britney Frederick MD; Sujit Clark. Spoke with: Mansi Nascimento, wife     Facility: [address] [place] [address]      Non-face-to-face services provided:  Obtained and reviewed discharge summary and/or continuity of care documents    Care Transitions 24 Hour Call    Do you have any ongoing symptoms?:  No  Do you have a copy of your discharge instructions?:  Yes  Do you have all of your prescriptions and are they filled?:  Yes  Have you been contacted by a [facility] Pharmacist?:  No  Have you scheduled your follow up appointment?:  No  Were you discharged with any Home Care or Post Acute Services:  Yes  Post Acute Services:  Home Health (Comment: [facility] for nursing, PT, OT)  Do you feel like you have everything you need to keep you well at home?:  Yes  Care Transitions Interventions  No Identified Needs     Spoke with Ricci's wife Mansi Nascimento for initial BPCI care transition call post hospital discharge. Mansi Nascimento reports that Demi Groves is doing \"much better\" today. She denies any complaints of SOB, JEFF, or edema today. She reports he has more energy after receiving blood in the hospital however, he continues to be weak. She stated that [facility] resumed this morning and therapy will be out tomorrow. Med review not completed per request.     CTN explained that a member of the Care Transition Central Team will be contacting them for further follow up calls, she is in agreement and denies any other needs or concerns at this time.      Follow Up  No future appointments.     Chloe Schwarz RN

## 2020-05-01 ENCOUNTER — HOSPITAL ENCOUNTER (OUTPATIENT)
Age: 85
Discharge: HOME | End: 2020-05-03
Payer: MEDICARE

## 2020-05-01 LAB
ALBUMIN SERPL-MCNC: 3.1 G/DL (ref 3.5–5.2)
ALP BLD-CCNC: 89 U/L (ref 40–129)
ALT SERPL-CCNC: 10 U/L (ref 0–40)
ANION GAP SERPL CALCULATED.3IONS-SCNC: 16 MMOL/L (ref 7–16)
AST SERPL-CCNC: 23 U/L (ref 0–39)
BASOPHILS ABSOLUTE: 0 E9/L (ref 0–0.2)
BASOPHILS RELATIVE PERCENT: 0 % (ref 0–2)
BILIRUB SERPL-MCNC: 0.5 MG/DL (ref 0–1.2)
BUN BLDV-MCNC: 33 MG/DL (ref 8–23)
CALCIUM SERPL-MCNC: 9.7 MG/DL (ref 8.6–10.2)
CHLORIDE BLD-SCNC: 93 MMOL/L (ref 98–107)
CO2: 24 MMOL/L (ref 22–29)
CREAT SERPL-MCNC: 0.9 MG/DL (ref 0.7–1.2)
EOSINOPHILS ABSOLUTE: 0.01 E9/L (ref 0.05–0.5)
EOSINOPHILS RELATIVE PERCENT: 0.2 % (ref 0–6)
GFR AFRICAN AMERICAN: >60
GFR NON-AFRICAN AMERICAN: >60 ML/MIN/1.73
GLUCOSE BLD-MCNC: 109 MG/DL (ref 74–99)
HCT VFR BLD CALC: 33.9 % (ref 37–54)
HEMOGLOBIN: 9.9 G/DL (ref 12.5–16.5)
IMMATURE GRANULOCYTES #: 0.14 E9/L
IMMATURE GRANULOCYTES %: 2.6 % (ref 0–5)
LYMPHOCYTES ABSOLUTE: 1.52 E9/L (ref 1.5–4)
LYMPHOCYTES RELATIVE PERCENT: 28.1 % (ref 20–42)
MCH RBC QN AUTO: 28.8 PG (ref 26–35)
MCHC RBC AUTO-ENTMCNC: 29.2 % (ref 32–34.5)
MCV RBC AUTO: 98.5 FL (ref 80–99.9)
MONOCYTES ABSOLUTE: 1.43 E9/L (ref 0.1–0.95)
MONOCYTES RELATIVE PERCENT: 26.4 % (ref 2–12)
NEUTROPHILS ABSOLUTE: 2.31 E9/L (ref 1.8–7.3)
NEUTROPHILS RELATIVE PERCENT: 42.7 % (ref 43–80)
PDW BLD-RTO: 17 FL (ref 11.5–15)
PLATELET # BLD: 103 E9/L (ref 130–450)
PMV BLD AUTO: 12.3 FL (ref 7–12)
POTASSIUM SERPL-SCNC: 4.6 MMOL/L (ref 3.5–5)
RBC # BLD: 3.44 E12/L (ref 3.8–5.8)
SODIUM BLD-SCNC: 133 MMOL/L (ref 132–146)
TOTAL PROTEIN: 8.7 G/DL (ref 6.4–8.3)
WBC # BLD: 5.4 E9/L (ref 4.5–11.5)

## 2020-05-01 PROCEDURE — 80053 COMPREHEN METABOLIC PANEL: CPT

## 2020-05-01 PROCEDURE — 36415 COLL VENOUS BLD VENIPUNCTURE: CPT

## 2020-05-01 PROCEDURE — 85025 COMPLETE CBC W/AUTO DIFF WBC: CPT

## 2020-05-06 ENCOUNTER — CARE COORDINATION (OUTPATIENT)
Dept: CASE MANAGEMENT | Age: 85
End: 2020-05-06

## 2020-05-13 ENCOUNTER — VIRTUAL VISIT (OUTPATIENT)
Dept: CARDIOLOGY CLINIC | Age: 85
End: 2020-05-13
Payer: MEDICARE

## 2020-05-13 VITALS
HEART RATE: 70 BPM | DIASTOLIC BLOOD PRESSURE: 56 MMHG | WEIGHT: 142 LBS | SYSTOLIC BLOOD PRESSURE: 120 MMHG | BODY MASS INDEX: 25.16 KG/M2 | HEIGHT: 63 IN

## 2020-05-13 PROCEDURE — 99442 PR PHYS/QHP TELEPHONE EVALUATION 11-20 MIN: CPT | Performed by: INTERNAL MEDICINE

## 2020-05-13 RX ORDER — ALLOPURINOL 300 MG/1
300 TABLET ORAL DAILY
COMMUNITY

## 2020-05-13 RX ORDER — FUROSEMIDE 20 MG/1
20 TABLET ORAL DAILY
Qty: 30 TABLET | Refills: 3 | Status: SHIPPED | OUTPATIENT
Start: 2020-05-13

## 2020-05-13 RX ORDER — SPIRONOLACTONE 25 MG/1
37.5 TABLET ORAL DAILY
COMMUNITY

## 2020-05-13 NOTE — PROGRESS NOTES
systolic function. Ejection fraction is visually estimated at 55%. Normal right ventricular size and function (TAPSE 2.4 cm). There is doppler evidence of stage I diastolic dysfunction. Mild mitral regurgitation. Stress test: N/A    Cardiac catheterization: N/A    No orders of the defined types were placed in this encounter. Requested Prescriptions      No prescriptions requested or ordered in this encounter        ASSESSMENT / PLAN:  1. Chronic HFpEF. Improved and sounds like he is doing well. 2. Anemia, endoscopies showing antral ulcer/ischemic colitis  3. Hypertension  4. Comorbid disease: Thrombocytopenia, hypokalemia, hyperlipidemia, BPH, severe spinal stenosis, acute/subacute small infarcts periventricular white matter 3/2020    Recommendations:  Doing well. · Reduce furosemide to 20 mg daily  · Reduce potassium chloride to 10 mEq daily  · Continue current cardiac medications otherwise  · Looks like he is on spironolactone 37.5 mg daily, not sure where this came from ? [facility] -will need to watch his [facility] function and potassium closely  · Encouraged him to stay active  · Follow-up in 2 months    The patient's current medication list, allergies, problem list and results of all previously ordered testing were reviewed at today's visit. Fabienne Banks MD     Lubna Pineda is a 80 y.o. male evaluated via telephone on 5/13/2020. Consent:  He and/or health care decision maker is aware that that he may receive a bill for this telephone service, depending on his insurance coverage, and has provided verbal consent to proceed: Yes      Documentation:  I communicated with the patient and/or health care decision maker about CHF.    Details of this discussion including any medical advice provided: See above      I affirm this is a Patient Initiated Episode with a Patient who has not had a related appointment within my department in the past 7 days or scheduled within the next 24

## 2020-05-15 ENCOUNTER — CARE COORDINATION (OUTPATIENT)
Dept: CASE MANAGEMENT | Age: 85
End: 2020-05-15

## 2020-05-21 ENCOUNTER — HOSPITAL ENCOUNTER (OUTPATIENT)
Age: 85
Discharge: HOME | End: 2020-05-23
Payer: MEDICARE

## 2020-05-21 LAB
ACANTHOCYTES: ABNORMAL
ALBUMIN SERPL-MCNC: 3.4 G/DL (ref 3.5–5.2)
ALP BLD-CCNC: 92 U/L (ref 40–129)
ALT SERPL-CCNC: 6 U/L (ref 0–40)
ANION GAP SERPL CALCULATED.3IONS-SCNC: 16 MMOL/L (ref 7–16)
AST SERPL-CCNC: 16 U/L (ref 0–39)
BASOPHILS ABSOLUTE: 0.04 E9/L (ref 0–0.2)
BASOPHILS RELATIVE PERCENT: 0.9 % (ref 0–2)
BILIRUB SERPL-MCNC: 0.3 MG/DL (ref 0–1.2)
BUN BLDV-MCNC: 30 MG/DL (ref 8–23)
BURR CELLS: ABNORMAL
CALCIUM SERPL-MCNC: 10 MG/DL (ref 8.6–10.2)
CHLORIDE BLD-SCNC: 98 MMOL/L (ref 98–107)
CO2: 20 MMOL/L (ref 22–29)
CREAT SERPL-MCNC: 1 MG/DL (ref 0.7–1.2)
EOSINOPHILS ABSOLUTE: 0 E9/L (ref 0.05–0.5)
EOSINOPHILS RELATIVE PERCENT: 0 % (ref 0–6)
GFR AFRICAN AMERICAN: >60
GFR NON-AFRICAN AMERICAN: >60 ML/MIN/1.73
GLUCOSE BLD-MCNC: 88 MG/DL (ref 74–99)
HCT VFR BLD CALC: 30.6 % (ref 37–54)
HEMOGLOBIN: 8.8 G/DL (ref 12.5–16.5)
LYMPHOCYTES ABSOLUTE: 1.32 E9/L (ref 1.5–4)
LYMPHOCYTES RELATIVE PERCENT: 33 % (ref 20–42)
MCH RBC QN AUTO: 28.7 PG (ref 26–35)
MCHC RBC AUTO-ENTMCNC: 28.8 % (ref 32–34.5)
MCV RBC AUTO: 99.7 FL (ref 80–99.9)
METAMYELOCYTES RELATIVE PERCENT: 0.9 % (ref 0–1)
MONOCYTES ABSOLUTE: 0.96 E9/L (ref 0.1–0.95)
MONOCYTES RELATIVE PERCENT: 24.3 % (ref 2–12)
NEUTROPHILS ABSOLUTE: 1.68 E9/L (ref 1.8–7.3)
NEUTROPHILS RELATIVE PERCENT: 40.9 % (ref 43–80)
OVALOCYTES: ABNORMAL
PDW BLD-RTO: 16.7 FL (ref 11.5–15)
PLATELET # BLD: 114 E9/L (ref 130–450)
PMV BLD AUTO: 11.9 FL (ref 7–12)
POIKILOCYTES: ABNORMAL
POLYCHROMASIA: ABNORMAL
POTASSIUM SERPL-SCNC: 4.2 MMOL/L (ref 3.5–5)
RBC # BLD: 3.07 E12/L (ref 3.8–5.8)
SODIUM BLD-SCNC: 134 MMOL/L (ref 132–146)
TOTAL PROTEIN: 8.8 G/DL (ref 6.4–8.3)
WBC # BLD: 4 E9/L (ref 4.5–11.5)

## 2020-05-21 PROCEDURE — 85025 COMPLETE CBC W/AUTO DIFF WBC: CPT

## 2020-05-21 PROCEDURE — 80053 COMPREHEN METABOLIC PANEL: CPT

## 2020-05-21 PROCEDURE — 36415 COLL VENOUS BLD VENIPUNCTURE: CPT

## 2020-05-29 ENCOUNTER — CARE COORDINATION (OUTPATIENT)
Dept: CASE MANAGEMENT | Age: 85
End: 2020-05-29

## 2020-06-05 ENCOUNTER — CARE COORDINATION (OUTPATIENT)
Dept: CASE MANAGEMENT | Age: 85
End: 2020-06-05

## 2020-06-05 NOTE — CARE COORDINATION
[facility] Transitions Follow Up Call    2020    Patient: Wu Finn  Patient : 3/10/1931   MRN: <[id number]>  Reason for Admission:   Discharge Date: 20 RARS: Readmission Risk Score: 20         Attempted to contact patient for follow up BPCI-A transition call. Unable to leave a voicemail message to return call. Will continue to follow. Care Transitions Subsequent and Final Call    Subsequent and Final Calls  Care Transitions Interventions  Other Interventions: Follow Up  No future appointments.     Mckay Blandon LPN

## 2020-06-18 ENCOUNTER — CARE COORDINATION (OUTPATIENT)
Dept: CASE MANAGEMENT | Age: 85
End: 2020-06-18

## 2020-06-18 NOTE — CARE COORDINATION
[facility] [address] Transitions Follow Up Call    2020    Patient: Jair Youssef  Patient : 3/10/1931   MRN: <[id number]>  Reason for Admission:   Discharge Date: 20 RARS: Readmission Risk Score: 20         Spoke with: Micki Guerrero (wife)  Patients wife reports. Patient is doing well. Appetite and fluid intake good. Wife denies patient has cp, sob, chills, wheeze,cough or palpitations. She says patient hasn't been weighting himself daily. Stressed the importance of patient weighing daily. Wife verbalized understanding. No questions, needs, or concerns. Will continue to follow. Care Transitions Subsequent and Final Call    Subsequent and Final Calls  Do you have any ongoing symptoms?:  No  Have your medications changed?:  No  Do you have any questions related to your medications?:  No  Do you currently have any active services?:  Yes  Are you currently active with any services?:  Home Health  Do you have any needs or concerns that I can assist you with?:  No  Identified Barriers:  None  Care Transitions Interventions  Other Interventions: Follow Up  No future appointments.     Nanci Richardson LPN

## 2020-07-31 ENCOUNTER — HOSPITAL ENCOUNTER (OUTPATIENT)
Age: 85
Discharge: HOME | End: 2020-08-02

## 2020-07-31 PROCEDURE — 87081 CULTURE SCREEN ONLY: CPT

## 2020-08-02 LAB — MRSA CULTURE ONLY: NORMAL

## 2020-08-06 ENCOUNTER — HOSPITAL ENCOUNTER (INPATIENT)
Age: 85
LOS: 3 days | Discharge: ANOTHER HOSPITAL | DRG: 920 | End: 2020-08-09
Attending: EMERGENCY MEDICINE | Admitting: SURGERY
Payer: MEDICARE

## 2020-08-06 ENCOUNTER — APPOINTMENT (OUTPATIENT)
Dept: GENERAL RADIOLOGY | Age: 85
DRG: 920 | End: 2020-08-06
Payer: MEDICARE

## 2020-08-06 ENCOUNTER — APPOINTMENT (OUTPATIENT)
Dept: CT IMAGING | Age: 85
DRG: 920 | End: 2020-08-06
Payer: MEDICARE

## 2020-08-06 PROBLEM — N50.1 PELVIC HEMATOMA IN MALE: Status: ACTIVE | Noted: 2020-08-06

## 2020-08-06 LAB
ABO/RH: NORMAL
ABO/RH: NORMAL
ALBUMIN SERPL-MCNC: 3.2 G/DL (ref 3.5–5.2)
ALP BLD-CCNC: 54 U/L (ref 40–129)
ALT SERPL-CCNC: <5 U/L (ref 0–40)
ANION GAP SERPL CALCULATED.3IONS-SCNC: 14 MMOL/L (ref 7–16)
ANION GAP SERPL CALCULATED.3IONS-SCNC: 15 MMOL/L (ref 7–16)
ANTIBODY SCREEN: NORMAL
APTT: 32.6 SEC (ref 24.5–35.1)
AST SERPL-CCNC: 13 U/L (ref 0–39)
BACTERIA: ABNORMAL /HPF
BASOPHILS ABSOLUTE: 0 E9/L (ref 0–0.2)
BASOPHILS RELATIVE PERCENT: 0 % (ref 0–2)
BILIRUB SERPL-MCNC: 0.2 MG/DL (ref 0–1.2)
BILIRUBIN URINE: NEGATIVE
BLOOD BANK DISPENSE STATUS: NORMAL
BLOOD BANK DISPENSE STATUS: NORMAL
BLOOD BANK PRODUCT CODE: NORMAL
BLOOD BANK PRODUCT CODE: NORMAL
BLOOD, URINE: ABNORMAL
BPU ID: NORMAL
BPU ID: NORMAL
BUN BLDV-MCNC: 40 MG/DL (ref 8–23)
BUN BLDV-MCNC: 40 MG/DL (ref 8–23)
CALCIUM SERPL-MCNC: 9.4 MG/DL (ref 8.6–10.2)
CALCIUM SERPL-MCNC: 9.5 MG/DL (ref 8.6–10.2)
CHLORIDE BLD-SCNC: 90 MMOL/L (ref 98–107)
CHLORIDE BLD-SCNC: 93 MMOL/L (ref 98–107)
CLARITY: ABNORMAL
CO2: 19 MMOL/L (ref 22–29)
CO2: 21 MMOL/L (ref 22–29)
COARSE CASTS, UA: ABNORMAL /LPF (ref 0–2)
COLOR: YELLOW
CREAT SERPL-MCNC: 1.3 MG/DL (ref 0.7–1.2)
CREAT SERPL-MCNC: 1.5 MG/DL (ref 0.7–1.2)
DESCRIPTION BLOOD BANK: NORMAL
DESCRIPTION BLOOD BANK: NORMAL
EKG ATRIAL RATE: 63 BPM
EKG P AXIS: 26 DEGREES
EKG P-R INTERVAL: 210 MS
EKG Q-T INTERVAL: 390 MS
EKG QRS DURATION: 82 MS
EKG QTC CALCULATION (BAZETT): 399 MS
EKG R AXIS: 20 DEGREES
EKG T AXIS: 27 DEGREES
EKG VENTRICULAR RATE: 63 BPM
EOSINOPHILS ABSOLUTE: 0 E9/L (ref 0.05–0.5)
EOSINOPHILS RELATIVE PERCENT: 0 % (ref 0–6)
EPITHELIAL CELLS, UA: ABNORMAL /HPF
GFR AFRICAN AMERICAN: 53
GFR AFRICAN AMERICAN: >60
GFR NON-AFRICAN AMERICAN: 44 ML/MIN/1.73
GFR NON-AFRICAN AMERICAN: 52 ML/MIN/1.73
GLUCOSE BLD-MCNC: 118 MG/DL (ref 74–99)
GLUCOSE BLD-MCNC: 146 MG/DL (ref 74–99)
GLUCOSE URINE: NEGATIVE MG/DL
HCT VFR BLD CALC: 22.2 % (ref 37–54)
HCT VFR BLD CALC: 24.2 % (ref 37–54)
HEMOGLOBIN: 6.8 G/DL (ref 12.5–16.5)
HEMOGLOBIN: 7.8 G/DL (ref 12.5–16.5)
IMMATURE GRANULOCYTES #: 0.02 E9/L
IMMATURE GRANULOCYTES %: 0.3 % (ref 0–5)
INR BLD: 1.1
KETONES, URINE: ABNORMAL MG/DL
LACTIC ACID: 2.3 MMOL/L (ref 0.5–2.2)
LEUKOCYTE ESTERASE, URINE: NEGATIVE
LIPASE: 254 U/L (ref 13–60)
LYMPHOCYTES ABSOLUTE: 1.19 E9/L (ref 1.5–4)
LYMPHOCYTES RELATIVE PERCENT: 15.3 % (ref 20–42)
MCH RBC QN AUTO: 29.4 PG (ref 26–35)
MCHC RBC AUTO-ENTMCNC: 30.6 % (ref 32–34.5)
MCV RBC AUTO: 96.1 FL (ref 80–99.9)
MONOCYTES ABSOLUTE: 1.34 E9/L (ref 0.1–0.95)
MONOCYTES RELATIVE PERCENT: 17.2 % (ref 2–12)
NEUTROPHILS ABSOLUTE: 5.22 E9/L (ref 1.8–7.3)
NEUTROPHILS RELATIVE PERCENT: 67.2 % (ref 43–80)
NITRITE, URINE: NEGATIVE
PDW BLD-RTO: 15.4 FL (ref 11.5–15)
PH UA: 5.5 (ref 5–9)
PLATELET # BLD: 132 E9/L (ref 130–450)
PMV BLD AUTO: 12.5 FL (ref 7–12)
POIKILOCYTES: ABNORMAL
POTASSIUM SERPL-SCNC: 5.3 MMOL/L (ref 3.5–5)
POTASSIUM SERPL-SCNC: 5.8 MMOL/L (ref 3.5–5)
PROTEIN UA: ABNORMAL MG/DL
PROTHROMBIN TIME: 12.5 SEC (ref 9.3–12.4)
RBC # BLD: 2.31 E12/L (ref 3.8–5.8)
RBC UA: ABNORMAL /HPF (ref 0–2)
REASON FOR REJECTION: NORMAL
REASON FOR REJECTION: NORMAL
REJECTED TEST: NORMAL
REJECTED TEST: NORMAL
SODIUM BLD-SCNC: 125 MMOL/L (ref 132–146)
SODIUM BLD-SCNC: 127 MMOL/L (ref 132–146)
SPECIFIC GRAVITY UA: 1.02 (ref 1–1.03)
SPHEROCYTES: ABNORMAL
TOTAL PROTEIN: 6.9 G/DL (ref 6.4–8.3)
UROBILINOGEN, URINE: 0.2 E.U./DL
WBC # BLD: 7.8 E9/L (ref 4.5–11.5)
WBC UA: ABNORMAL /HPF (ref 0–5)

## 2020-08-06 PROCEDURE — C9113 INJ PANTOPRAZOLE SODIUM, VIA: HCPCS | Performed by: EMERGENCY MEDICINE

## 2020-08-06 PROCEDURE — 86923 COMPATIBILITY TEST ELECTRIC: CPT

## 2020-08-06 PROCEDURE — 86900 BLOOD TYPING SEROLOGIC ABO: CPT

## 2020-08-06 PROCEDURE — 6360000004 HC RX CONTRAST MEDICATION: Performed by: RADIOLOGY

## 2020-08-06 PROCEDURE — 83690 ASSAY OF LIPASE: CPT

## 2020-08-06 PROCEDURE — 85025 COMPLETE CBC W/AUTO DIFF WBC: CPT

## 2020-08-06 PROCEDURE — 96374 THER/PROPH/DIAG INJ IV PUSH: CPT

## 2020-08-06 PROCEDURE — 85610 PROTHROMBIN TIME: CPT

## 2020-08-06 PROCEDURE — 85014 HEMATOCRIT: CPT

## 2020-08-06 PROCEDURE — 36415 COLL VENOUS BLD VENIPUNCTURE: CPT

## 2020-08-06 PROCEDURE — 85018 HEMOGLOBIN: CPT

## 2020-08-06 PROCEDURE — 6370000000 HC RX 637 (ALT 250 FOR IP): Performed by: FAMILY MEDICINE

## 2020-08-06 PROCEDURE — 74177 CT ABD & PELVIS W/CONTRAST: CPT

## 2020-08-06 PROCEDURE — 99284 EMERGENCY DEPT VISIT MOD MDM: CPT

## 2020-08-06 PROCEDURE — 99285 EMERGENCY DEPT VISIT HI MDM: CPT

## 2020-08-06 PROCEDURE — 93005 ELECTROCARDIOGRAM TRACING: CPT | Performed by: EMERGENCY MEDICINE

## 2020-08-06 PROCEDURE — 6370000000 HC RX 637 (ALT 250 FOR IP): Performed by: SURGERY

## 2020-08-06 PROCEDURE — 2580000003 HC RX 258: Performed by: EMERGENCY MEDICINE

## 2020-08-06 PROCEDURE — 86920 COMPATIBILITY TEST SPIN: CPT

## 2020-08-06 PROCEDURE — 36430 TRANSFUSION BLD/BLD COMPNT: CPT

## 2020-08-06 PROCEDURE — 93010 ELECTROCARDIOGRAM REPORT: CPT | Performed by: INTERNAL MEDICINE

## 2020-08-06 PROCEDURE — 83605 ASSAY OF LACTIC ACID: CPT

## 2020-08-06 PROCEDURE — 71045 X-RAY EXAM CHEST 1 VIEW: CPT

## 2020-08-06 PROCEDURE — 85730 THROMBOPLASTIN TIME PARTIAL: CPT

## 2020-08-06 PROCEDURE — 6360000002 HC RX W HCPCS: Performed by: EMERGENCY MEDICINE

## 2020-08-06 PROCEDURE — 2060000000 HC ICU INTERMEDIATE R&B

## 2020-08-06 PROCEDURE — 86901 BLOOD TYPING SEROLOGIC RH(D): CPT

## 2020-08-06 PROCEDURE — 86850 RBC ANTIBODY SCREEN: CPT

## 2020-08-06 PROCEDURE — 80053 COMPREHEN METABOLIC PANEL: CPT

## 2020-08-06 PROCEDURE — 2580000003 HC RX 258: Performed by: STUDENT IN AN ORGANIZED HEALTH CARE EDUCATION/TRAINING PROGRAM

## 2020-08-06 PROCEDURE — P9016 RBC LEUKOCYTES REDUCED: HCPCS

## 2020-08-06 PROCEDURE — 80048 BASIC METABOLIC PNL TOTAL CA: CPT

## 2020-08-06 PROCEDURE — 81001 URINALYSIS AUTO W/SCOPE: CPT

## 2020-08-06 RX ORDER — POTASSIUM CHLORIDE 750 MG/1
10 TABLET, EXTENDED RELEASE ORAL 2 TIMES DAILY WITH MEALS
Status: DISCONTINUED | OUTPATIENT
Start: 2020-08-06 | End: 2020-08-06

## 2020-08-06 RX ORDER — ALLOPURINOL 300 MG/1
300 TABLET ORAL DAILY
Status: DISCONTINUED | OUTPATIENT
Start: 2020-08-06 | End: 2020-08-09 | Stop reason: HOSPADM

## 2020-08-06 RX ORDER — VITAMIN B COMPLEX
3000 TABLET ORAL DAILY
Status: DISCONTINUED | OUTPATIENT
Start: 2020-08-06 | End: 2020-08-09 | Stop reason: HOSPADM

## 2020-08-06 RX ORDER — 0.9 % SODIUM CHLORIDE 0.9 %
1000 INTRAVENOUS SOLUTION INTRAVENOUS ONCE
Status: COMPLETED | OUTPATIENT
Start: 2020-08-06 | End: 2020-08-06

## 2020-08-06 RX ORDER — SPIRONOLACTONE 25 MG/1
37.5 TABLET ORAL DAILY
Status: DISCONTINUED | OUTPATIENT
Start: 2020-08-06 | End: 2020-08-08

## 2020-08-06 RX ORDER — SODIUM CHLORIDE, SODIUM LACTATE, POTASSIUM CHLORIDE, CALCIUM CHLORIDE 600; 310; 30; 20 MG/100ML; MG/100ML; MG/100ML; MG/100ML
INJECTION, SOLUTION INTRAVENOUS CONTINUOUS
Status: DISCONTINUED | OUTPATIENT
Start: 2020-08-06 | End: 2020-08-07

## 2020-08-06 RX ORDER — LISINOPRIL 10 MG/1
10 TABLET ORAL DAILY
Status: DISCONTINUED | OUTPATIENT
Start: 2020-08-06 | End: 2020-08-08

## 2020-08-06 RX ORDER — DOXAZOSIN 2 MG/1
2 TABLET ORAL NIGHTLY
Status: DISCONTINUED | OUTPATIENT
Start: 2020-08-06 | End: 2020-08-09 | Stop reason: HOSPADM

## 2020-08-06 RX ORDER — LATANOPROST 50 UG/ML
1 SOLUTION/ DROPS OPHTHALMIC NIGHTLY
Status: DISCONTINUED | OUTPATIENT
Start: 2020-08-06 | End: 2020-08-09 | Stop reason: HOSPADM

## 2020-08-06 RX ORDER — PANTOPRAZOLE SODIUM 40 MG/10ML
80 INJECTION, POWDER, LYOPHILIZED, FOR SOLUTION INTRAVENOUS ONCE
Status: COMPLETED | OUTPATIENT
Start: 2020-08-06 | End: 2020-08-06

## 2020-08-06 RX ORDER — 0.9 % SODIUM CHLORIDE 0.9 %
20 INTRAVENOUS SOLUTION INTRAVENOUS ONCE
Status: COMPLETED | OUTPATIENT
Start: 2020-08-06 | End: 2020-08-06

## 2020-08-06 RX ORDER — HYDROCODONE BITARTRATE AND ACETAMINOPHEN 10; 325 MG/1; MG/1
1 TABLET ORAL EVERY 4 HOURS PRN
Status: DISCONTINUED | OUTPATIENT
Start: 2020-08-06 | End: 2020-08-09 | Stop reason: HOSPADM

## 2020-08-06 RX ORDER — ONDANSETRON 2 MG/ML
4 INJECTION INTRAMUSCULAR; INTRAVENOUS ONCE
Status: COMPLETED | OUTPATIENT
Start: 2020-08-06 | End: 2020-08-06

## 2020-08-06 RX ORDER — LACTULOSE 10 G/15ML
10 SOLUTION ORAL DAILY
Status: DISCONTINUED | OUTPATIENT
Start: 2020-08-06 | End: 2020-08-09 | Stop reason: HOSPADM

## 2020-08-06 RX ORDER — FUROSEMIDE 20 MG/1
20 TABLET ORAL DAILY
Status: DISCONTINUED | OUTPATIENT
Start: 2020-08-06 | End: 2020-08-08

## 2020-08-06 RX ORDER — DOCUSATE SODIUM 100 MG/1
100 CAPSULE, LIQUID FILLED ORAL 2 TIMES DAILY PRN
Status: DISCONTINUED | OUTPATIENT
Start: 2020-08-06 | End: 2020-08-09 | Stop reason: HOSPADM

## 2020-08-06 RX ORDER — FERROUS SULFATE 325(65) MG
325 TABLET ORAL 2 TIMES DAILY WITH MEALS
Status: DISCONTINUED | OUTPATIENT
Start: 2020-08-06 | End: 2020-08-08

## 2020-08-06 RX ORDER — ONDANSETRON 2 MG/ML
4 INJECTION INTRAMUSCULAR; INTRAVENOUS EVERY 6 HOURS PRN
Status: DISCONTINUED | OUTPATIENT
Start: 2020-08-06 | End: 2020-08-09 | Stop reason: HOSPADM

## 2020-08-06 RX ORDER — SODIUM CHLORIDE 0.9 % (FLUSH) 0.9 %
10 SYRINGE (ML) INJECTION PRN
Status: DISCONTINUED | OUTPATIENT
Start: 2020-08-06 | End: 2020-08-09 | Stop reason: HOSPADM

## 2020-08-06 RX ADMIN — ALLOPURINOL 300 MG: 300 TABLET ORAL at 12:51

## 2020-08-06 RX ADMIN — SODIUM CHLORIDE 20 ML: 9 INJECTION, SOLUTION INTRAVENOUS at 09:16

## 2020-08-06 RX ADMIN — ONDANSETRON 4 MG: 2 INJECTION INTRAMUSCULAR; INTRAVENOUS at 08:54

## 2020-08-06 RX ADMIN — VITAMIN D, TAB 1000IU (100/BT) 3000 UNITS: 25 TAB at 12:50

## 2020-08-06 RX ADMIN — SODIUM CHLORIDE 1000 ML: 9 INJECTION, SOLUTION INTRAVENOUS at 08:53

## 2020-08-06 RX ADMIN — SODIUM CHLORIDE, POTASSIUM CHLORIDE, SODIUM LACTATE AND CALCIUM CHLORIDE: 600; 310; 30; 20 INJECTION, SOLUTION INTRAVENOUS at 15:08

## 2020-08-06 RX ADMIN — CALCIUM GLUCONATE 1 G: 98 INJECTION, SOLUTION INTRAVENOUS at 09:37

## 2020-08-06 RX ADMIN — LATANOPROST 1 DROP: 50 SOLUTION/ DROPS OPHTHALMIC at 23:44

## 2020-08-06 RX ADMIN — PANTOPRAZOLE SODIUM 80 MG: 40 INJECTION, POWDER, FOR SOLUTION INTRAVENOUS at 09:23

## 2020-08-06 RX ADMIN — IOPAMIDOL 110 ML: 755 INJECTION, SOLUTION INTRAVENOUS at 08:44

## 2020-08-06 RX ADMIN — HYDROCODONE BITARTRATE AND ACETAMINOPHEN 1 TABLET: 10; 325 TABLET ORAL at 23:50

## 2020-08-06 ASSESSMENT — ENCOUNTER SYMPTOMS
NAUSEA: 1
BACK PAIN: 0
SHORTNESS OF BREATH: 0
RECTAL BLEEDING: 0
EYE DISCHARGE: 0
DIARRHEA: 0
WHEEZING: 0
EYE REDNESS: 0
SINUS PRESSURE: 0
EYE PAIN: 0
COUGH: 0
SORE THROAT: 0
VOMITING: 1
ABDOMINAL PAIN: 1

## 2020-08-06 ASSESSMENT — PAIN SCALES - GENERAL
PAINLEVEL_OUTOF10: 5
PAINLEVEL_OUTOF10: 0

## 2020-08-06 NOTE — ED PROVIDER NOTES
Normocephalic and atraumatic. Mouth/Throat:      Mouth: Mucous membranes are moist.   Eyes:      Extraocular Movements: Extraocular movements intact. Pupils: Pupils are equal, round, and reactive to light. Cardiovascular:      Rate and Rhythm: Regular rhythm. Tachycardia present. Pulses: Normal pulses. Heart sounds: Normal heart sounds. Pulmonary:      Effort: Pulmonary effort is normal.      Breath sounds: Normal breath sounds. Abdominal:      General: Abdomen is flat. Bowel sounds are normal.      Palpations: Abdomen is soft. Musculoskeletal: Normal range of motion. Skin:     Capillary Refill: Capillary refill takes less than 2 seconds. Coloration: Skin is pale. Neurological:      General: No focal deficit present. Mental Status: He is alert and oriented to person, place, and time. Procedures     MDM  Number of Diagnoses or Management Options  VALERIE (acute kidney injury) ([facility] [address]): Anemia, unspecified type:   Dehydration:   Hemorrhagic shock ([facility] [address]): Hyperkalemia:   Nausea and vomiting, intractability of vomiting not specified, unspecified vomiting type:   Orthostatic syncope:   Pelvic hematoma, male:   S/P hernia surgery:   Syncope and collapse:   Diagnosis management comments: Patient seen and examined. Labs and imaging were ordered. My concern is for postop bleed. Patient was orthostatic positive and passed out with emesis including black emesis. Labs and imaging were performed patient is found have a 13 cm pelvic hematoma. Case was discussed with Dr. Domingo Palacios. Protonix was ordered 2 units of trauma blood was ordered patient never was hypotensive here in the emergency department but was symptomatic. Initial 2 units were ordered. Patient's blood pressure remained stable throughout stay here in the emergency department. Dr. Domingo Palacios was willing to admit.   I did discuss the case with the patient's primary care physician for consultation he was agreeable to consult. Patient was transported to a telemetry bed. ED Course as of Aug 06 1643   Thu Aug 06, 2020   0830 Attempted to do orthostatic blood pressures however when patient was stood up he became very lightheaded stating he needed to sit down he then syncopized and had a large bout of emesis. Patient laid flat and then became more responsive and was resting comfortably in the bed. Patient sent over for CT scan for further evaluation abdomen.    [CF]   0834 EKG: This EKG is signed and interpreted by the EP. Time: 8:06  Rate: 63  Rhythm: NSR with 1st degree AV block  Interpretation: 1st degree AV block  Comparison: stable as compared to patient's most recent EKG      [CF]      ED Course User Index  [CF] Tom Germain,         --------------------------------------------- PAST HISTORY ---------------------------------------------  Past Medical History:  has a past medical history of Abnormal brain MRI, Episode of syncope, GI bleed, and Hypertension. Past Surgical History:  has a past surgical history that includes hernia repair; joint replacement (Bilateral); Carpal tunnel release (Bilateral); eye surgery; back surgery; Upper gastrointestinal endoscopy (N/A, 4/26/2020); and Colonoscopy (N/A, 4/26/2020). Social History:  reports that he quit smoking about 40 years ago. His smoking use included cigarettes. He has quit using smokeless tobacco. He reports current alcohol use of about 2.0 standard drinks of alcohol per week. He reports that he does not use drugs. Family History: family history includes Cancer in his father; Heart Attack in his brother. The patients home medications have been reviewed. Allergies: Patient has no known allergies.     -------------------------------------------------- RESULTS -------------------------------------------------    LABS:  Results for orders placed or performed during the hospital encounter of 08/06/20   CBC Auto Differential   Result Value Ref Range Protein, UA TRACE Negative mg/dL    Urobilinogen, Urine 0.2 <2.0 E.U./dL    Nitrite, Urine Negative Negative    Leukocyte Esterase, Urine Negative Negative   Protime-INR   Result Value Ref Range    Protime 12.5 (H) 9.3 - 12.4 sec    INR 1.1    APTT   Result Value Ref Range    aPTT 32.6 24.5 - 35.1 sec   Microscopic Urinalysis   Result Value Ref Range    Coarse Casts, UA 0-2 0 - 2 /LPF    WBC, UA 1-3 0 - 5 /HPF    RBC, UA 10-20 (A) 0 - 2 /HPF    Epithelial Cells, UA RARE /HPF    Bacteria, UA MODERATE (A) None Seen /HPF   Hemoglobin and hematocrit, blood   Result Value Ref Range    Hemoglobin 7.8 (L) 12.5 - 16.5 g/dL    Hematocrit 24.2 (L) 37.0 - 54.0 %   SPECIMEN REJECTION   Result Value Ref Range    Rejected Test BMP     Reason for Rejection see below    SPECIMEN REJECTION   Result Value Ref Range    Rejected Test BMP     Reason for Rejection see below    EKG 12 Lead   Result Value Ref Range    Ventricular Rate 63 BPM    Atrial Rate 63 BPM    P-R Interval 210 ms    QRS Duration 82 ms    Q-T Interval 390 ms    QTc Calculation (Bazett) 399 ms    P Axis 26 degrees    R Axis 20 degrees    T Axis 27 degrees   TYPE AND SCREEN   Result Value Ref Range    ABO/Rh CANCELED     Antibody Screen NEG    PREPARE RBC (CROSSMATCH), 2 Units   Result Value Ref Range    Product Code Blood Bank [id number]     Description Blood Bank Red Blood Cells, Leuko-reduced     Unit Number [id number]     Dispense Status Blood Bank issued     Product Code Blood Bank [id number]     Description Blood Bank Red Blood Cells, Apheresis, Leuko-reduced     Unit Number [id number]     Dispense Status Blood Bank issued    ABORH TUBE   Result Value Ref Range    ABO/Rh O NEG        RADIOLOGY:  No results found. EKG: This EKG is signed and interpreted by me.     Rate: 63  Rhythm: Sinus  Interpretation: NSR 1st degree AV block  Comparison: stable as compared to patient's most recent EKG      ------------------------- NURSING NOTES AND VITALS REVIEWED ---------------------------  Date / Time Roomed:  8/6/2020  8:03 AM  ED Bed Assignment:  [id number]/9641-I    The nursing notes within the ED encounter and vital signs as below have been reviewed. Patient Vitals for the past 24 hrs:   BP Temp Temp src Pulse Resp SpO2 Height Weight   08/06/20 1530 -- 98.1 °F (36.7 °C) Oral 66 -- -- -- --   08/06/20 1330 (!) 120/50 98.2 °F (36.8 °C) Oral 72 18 95 % -- --   08/06/20 1130 (!) 112/50 97.9 °F (36.6 °C) Oral 58 18 95 % -- --   08/06/20 1000 (!) 91/45 -- Oral 57 18 97 % 5' 4\" (1.626 m) --   08/06/20 0930 (!) 120/47 97.6 °F (36.4 °C) Oral 54 20 98 % -- --   08/06/20 0910 (!) 130/55 97.9 °F (36.6 °C) -- 58 18 -- -- --   08/06/20 0807 (!) 115/56 98 °F (36.7 °C) Oral 61 16 95 % 5' 4\" (1.626 m) 135 lb (61.2 kg)       Oxygen Saturation Interpretation: Normal    ------------------------------------------ PROGRESS NOTES ------------------------------------------  Counseling:  I have spoken with the patient and discussed todays results, in addition to providing specific details for the plan of care and counseling regarding the diagnosis and prognosis. Their questions are answered at this time and they are agreeable with the plan of admission.    --------------------------------- ADDITIONAL PROVIDER NOTES ---------------------------------  This patient's ED course included: a personal history and physicial examination, re-evaluation prior to disposition, multiple bedside re-evaluations, IV medications, cardiac monitoring, continuous pulse oximetry and complex medical decision making and emergency management    This patient has remained hemodynamically stable during their ED course. Diagnosis:  1. Syncope and collapse    2. Pelvic hematoma, male    3. VALERIE (acute kidney injury) ([facility] [address]    4. Hyperkalemia    5. Dehydration    6. S/P hernia surgery    7. Nausea and vomiting, intractability of vomiting not specified, unspecified vomiting type    8. Orthostatic syncope    9.  Anemia, unspecified type    10. Hemorrhagic shock ([facility] [address]      Please note that the withdrawal or failure to initiate urgent interventions for this patient would likely result in a life threatening deterioration or permanent disability. Accordingly this patient received 60 minutes of critical care time, excluding separately billable procedures. Disposition:  Patient's disposition: Admit to telemetry  Patient's condition is serious.            Peggy Allen, DO  08/06/20 1641

## 2020-08-06 NOTE — H&P
GENERAL SURGERY  HISTORY AND PHYSICAL  8/6/2020    Chief Complaint   Patient presents with    Loss of Consciousness       HPI  Carter Choudhury is a 80 y.o. male who is s/p robotic inguinal hernia repair at [facility] 8/5 presents for evaluation of weakness, fatigue, and syncopal episodes at home. Presented to the ED with soft blood pressures and Hgb 6.8 (baseline 8-9) with a CT scan showing 13cm pelvic hematoma. He was given 2u RBC and is now normotensive. Comlpains of mild discomfort suprapubic region and groin. Denies any nausea, vomiting, chest pain, SOB. Past Medical History:   Diagnosis Date    Abnormal brain MRI 03/26/2020    Episode of syncope 03/25/2020    GI bleed     FOBT negative 3/29/20    Hypertension        Past Surgical History:   Procedure Laterality Date    BACK SURGERY      CARPAL TUNNEL RELEASE Bilateral     COLONOSCOPY N/A 4/26/2020    COLONOSCOPY DIAGNOSTIC performed by Paula Jeffries MD at [facility] JOINT REPLACEMENT Bilateral     hips    UPPER GASTROINTESTINAL ENDOSCOPY N/A 4/26/2020    EGD ESOPHAGOGASTRODUODENOSCOPY performed by Paula Jeffries MD at [address] [facility] [address]       Prior to Admission medications    Medication Sig Start Date End Date Taking?  Authorizing Provider   allopurinol (ZYLOPRIM) 300 MG tablet Take 300 mg by mouth daily   Yes Historical Provider, MD   spironolactone (ALDACTONE) 25 MG tablet Take 37.5 mg by mouth daily   Yes Historical Provider, MD   LACTULOSE PO Take by mouth as needed   Yes Historical Provider, MD   Multiple Vitamins-Minerals (PRESERVISION AREDS 2+MULTI VIT PO) Take by mouth daily   Yes Historical Provider, MD   VITAMIN D PO Take 3,000 Units by mouth daily   Yes Historical Provider, MD   Glucosamine-Chondroitin (GLUCOSAMINE CHONDR COMPLEX PO) Take by mouth daily   Yes Historical Provider, MD   Saw [place], Serenoa repens, (SAW PALMETTO PO) Take by mouth daily   Yes Historical Provider, MD   furosemide (LASIX) 20 MG tablet Take 1 tablet by mouth daily 20  Yes Imani Escudero MD   doxazosin (CARDURA) 2 MG tablet Take 1 tablet by mouth nightly 20  Yes Emily Katz MD   potassium chloride (KLOR-CON M) 20 MEQ extended release tablet Take 1 tablet by mouth 2 times daily (with meals)  Patient taking differently: Take 10 mEq by mouth 2 times daily (with meals)  20  Yes Emily Katz MD   ferrous sulfate (IRON 325) 325 (65 Fe) MG tablet Take 1 tablet by mouth 2 times daily (with meals)  Patient taking differently: Take 325 mg by mouth as needed  3/31/20  Yes Jennifer Castro MD   latanoprost (XALATAN) 0.005 % ophthalmic solution Place 1 drop into the right eye nightly   Yes Historical Provider, MD   docusate sodium (COLACE) 100 MG capsule Take 100 mg by mouth 2 times daily as needed for Constipation   Yes Historical Provider, MD   lisinopril (PRINIVIL;ZESTRIL) 10 MG tablet Take 10 mg by mouth daily   Yes Historical Provider, MD   pantoprazole (PROTONIX) 40 MG tablet Take 1 tablet by mouth every morning (before breakfast) 20   Emily Katz MD   simvastatin (ZOCOR) 20 MG tablet Take 1 tablet by mouth nightly  Patient not taking: Reported on 2020 3/31/20   Jennifer Castro MD       No Known Allergies    Family History   Problem Relation Age of Onset    Cancer Father     Heart Attack Brother        Social History     Tobacco Use    Smoking status: Former Smoker     Types: Cigarettes     Last attempt to quit: 1980     Years since quittin.6    Smokeless tobacco: Former User   Substance Use Topics    Alcohol use: Yes     Alcohol/week: 2.0 standard drinks     Types: 2 Shots of liquor per week     Comment: occasional    Drug use: Never         Review of Systems: pertinent ROS listed in HPI, all others negative       PHYSICAL EXAM:    Vitals:    20 1330   BP: (!) 120/50   Pulse: 72   Resp: 18   Temp: 98.2 °F (36.8 °C)   SpO2: 95%       GENERAL:  NAD. A&Ox3.  HEAD:  Normocephalic, atraumatic. LUNGS:  No increased work of breathing. CARDIOVASCULAR: RR  ABDOMEN:  Soft, non-distended, mild tenderness groin and testicles, soft without expanding hematoma. Incisions c/d/i. No guarding, rigidity, rebound. ASSESSMENT/PLAN:  80 y.o. male with pelvic hematoma s/p robotic inguinal hernia repair 8/5    Blood pressure responded to 2u RBC  Trend Hgb  Start IVF  Hold blood thinners or DVT prophylaxis    Plan will be discussed with Dr. Vernon Mcintosh DO  Resident, PGY-3  8/6/2020  2:27 PM

## 2020-08-06 NOTE — ED NOTES
Patient had a syncopal episode while standing patient to do orthostatic blood pressure. Patient sat down on bed, patient had large coffee ground emesis. Patient was suctioned, now more alert, wife in room.   Patient taken to CT via cart     Nathan Auguste RN  08/06/20 3191

## 2020-08-06 NOTE — ED NOTES
Name: Altagracia Winn  : 3/10/1931  MRN: [id number]    Date: 2020    Benefits of immediately proceeding with Radiology exam outweigh the risks and therefore the following is being waived:      [] Pregnancy test    [] Protocol for Iodine allergy    [] MRI questionnaire    [x] BUN/Creatinine        DO David Young DO  20 0890

## 2020-08-07 PROBLEM — N28.9 ACUTE RENAL INSUFFICIENCY: Status: ACTIVE | Noted: 2020-08-07

## 2020-08-07 PROBLEM — E87.5 HYPERKALEMIA: Status: ACTIVE | Noted: 2020-08-07

## 2020-08-07 PROBLEM — E87.1 HYPONATREMIA: Status: ACTIVE | Noted: 2020-08-07

## 2020-08-07 LAB
ANION GAP SERPL CALCULATED.3IONS-SCNC: 12 MMOL/L (ref 7–16)
BUN BLDV-MCNC: 36 MG/DL (ref 8–23)
CALCIUM SERPL-MCNC: 9.2 MG/DL (ref 8.6–10.2)
CHLORIDE BLD-SCNC: 96 MMOL/L (ref 98–107)
CO2: 22 MMOL/L (ref 22–29)
CREAT SERPL-MCNC: 1.2 MG/DL (ref 0.7–1.2)
GFR AFRICAN AMERICAN: >60
GFR NON-AFRICAN AMERICAN: 57 ML/MIN/1.73
GLUCOSE BLD-MCNC: 95 MG/DL (ref 74–99)
HCT VFR BLD CALC: 21.4 % (ref 37–54)
HCT VFR BLD CALC: 22.8 % (ref 37–54)
HCT VFR BLD CALC: 26.3 % (ref 37–54)
HEMOGLOBIN: 6.8 G/DL (ref 12.5–16.5)
HEMOGLOBIN: 7.3 G/DL (ref 12.5–16.5)
HEMOGLOBIN: 8.4 G/DL (ref 12.5–16.5)
POTASSIUM SERPL-SCNC: 5 MMOL/L (ref 3.5–5)
SODIUM BLD-SCNC: 130 MMOL/L (ref 132–146)

## 2020-08-07 PROCEDURE — 97165 OT EVAL LOW COMPLEX 30 MIN: CPT

## 2020-08-07 PROCEDURE — 36415 COLL VENOUS BLD VENIPUNCTURE: CPT

## 2020-08-07 PROCEDURE — P9016 RBC LEUKOCYTES REDUCED: HCPCS

## 2020-08-07 PROCEDURE — 97530 THERAPEUTIC ACTIVITIES: CPT

## 2020-08-07 PROCEDURE — 85014 HEMATOCRIT: CPT

## 2020-08-07 PROCEDURE — 80048 BASIC METABOLIC PNL TOTAL CA: CPT

## 2020-08-07 PROCEDURE — 2580000003 HC RX 258: Performed by: STUDENT IN AN ORGANIZED HEALTH CARE EDUCATION/TRAINING PROGRAM

## 2020-08-07 PROCEDURE — 2060000000 HC ICU INTERMEDIATE R&B

## 2020-08-07 PROCEDURE — 6370000000 HC RX 637 (ALT 250 FOR IP): Performed by: FAMILY MEDICINE

## 2020-08-07 PROCEDURE — 6370000000 HC RX 637 (ALT 250 FOR IP): Performed by: SURGERY

## 2020-08-07 PROCEDURE — 85018 HEMOGLOBIN: CPT

## 2020-08-07 PROCEDURE — 36430 TRANSFUSION BLD/BLD COMPNT: CPT

## 2020-08-07 PROCEDURE — 2580000003 HC RX 258: Performed by: EMERGENCY MEDICINE

## 2020-08-07 RX ORDER — 0.9 % SODIUM CHLORIDE 0.9 %
20 INTRAVENOUS SOLUTION INTRAVENOUS ONCE
Status: COMPLETED | OUTPATIENT
Start: 2020-08-07 | End: 2020-08-07

## 2020-08-07 RX ADMIN — HYDROCODONE BITARTRATE AND ACETAMINOPHEN 1 TABLET: 10; 325 TABLET ORAL at 16:56

## 2020-08-07 RX ADMIN — LATANOPROST 1 DROP: 50 SOLUTION/ DROPS OPHTHALMIC at 22:32

## 2020-08-07 RX ADMIN — SODIUM CHLORIDE, PRESERVATIVE FREE 10 ML: 5 INJECTION INTRAVENOUS at 09:01

## 2020-08-07 RX ADMIN — ALLOPURINOL 300 MG: 300 TABLET ORAL at 09:01

## 2020-08-07 RX ADMIN — SODIUM CHLORIDE 20 ML: 9 INJECTION, SOLUTION INTRAVENOUS at 09:00

## 2020-08-07 RX ADMIN — FERROUS SULFATE TAB 325 MG (65 MG ELEMENTAL FE) 325 MG: 325 (65 FE) TAB at 16:52

## 2020-08-07 RX ADMIN — HYDROCODONE BITARTRATE AND ACETAMINOPHEN 1 TABLET: 10; 325 TABLET ORAL at 22:32

## 2020-08-07 RX ADMIN — FERROUS SULFATE TAB 325 MG (65 MG ELEMENTAL FE) 325 MG: 325 (65 FE) TAB at 09:00

## 2020-08-07 RX ADMIN — VITAMIN D, TAB 1000IU (100/BT) 3000 UNITS: 25 TAB at 09:00

## 2020-08-07 ASSESSMENT — PAIN DESCRIPTION - PAIN TYPE: TYPE: SURGICAL PAIN

## 2020-08-07 ASSESSMENT — PAIN SCALES - GENERAL
PAINLEVEL_OUTOF10: 6
PAINLEVEL_OUTOF10: 6
PAINLEVEL_OUTOF10: 0

## 2020-08-07 ASSESSMENT — PAIN DESCRIPTION - LOCATION: LOCATION: ABDOMEN

## 2020-08-07 ASSESSMENT — PAIN DESCRIPTION - DESCRIPTORS: DESCRIPTORS: ACHING

## 2020-08-07 NOTE — PROGRESS NOTES
Occupational Therapy  OCCUPATIONAL THERAPY INITIAL EVALUATION      Date:2020  Patient Name: Fiordaliza Esquivel  MRN: [id number]  : 3/10/1931  Room: [address] [place] [address]    Referring Provider: Armando Hanley MD   Evaluating OT: Trae Walker. Melony Pedraza - [id number]    AM-PAC Daily Activity Raw Score: 1524      Recommended Adaptive Equipment: Continue to assess. Diagnosis: Pelvic hematoma in male [N50.1]   Patient underwent s/p robotic inguinal hernia repair at [facility] 2020. Pertinent Medical History: HTN     Precautions: falls, bed alarm, skin integrity    Home Living: Patient lives with his wife and son in a two-floor home (two steps to enter); patient's bedroom and bathroom are on the second floor (stair glide between main living level and second floor). Patient noted that he has exercise equipment in his basement, which he regularly uses; there is a stair glide available between the main living level and the basement. Bathroom Setup: walk-in shower (with seat and grab bars - no handheld shower head) and elevated seat  Equipment Owned: rollators (he keeps one on each level of his home), BSC, shower chair    Prior Level of Function (PLOF): Per patient, he was independent with ADLs, needed assistance with IADLs, and independent with functional mobility (using rollator) prior to recent abdominal surgery. Driving: No  Occupation: Patient is a retired orthodontist.    Pain Level: Patient reported experiencing abdominal pain, which he rated 3-4 out of 10 at rest. Patient reported increased abdominal pain during bed mobility and functional transfers/mobility; nursing present and aware of patient's request for pain medication. Cognition: Patient alert and oriented x3. WFL command follow demonstrated. Patient pleasant, cooperative, and motivated to return to PLOF.    Memory: WFL   Sequencing: WFL   Problem Solving: Fair+   Judgement/Safety: Fair+    Functional Assessment:   Initial Eval Status  Date: 2020 Treatment Status  Date:  Short Term Goals  Treatment Frequency/Duration: 2-5x/week for 3-7 days PRN   Feeding Setup  Independent   Grooming Min A  Mod I / Independent  (seated/standing at sink)   UB Dressing Min A  Independent   LB Dressing Mod A  SBA - with use of AE, as needed/appropriate   Bathing Mod A  SBA - with use of AE/DME, as needed/appropriate   Toileting Mod A  Patient with thompson catheter currently. Mod I / Independent   Bed Mobility  Supine-to-Sit: Max A  Sit-to-Supine: Max A  Patient education provided regarding log rolling technique. SBA   Functional Transfers Sit-to-Stand: Min A   from EOB  Independent   Functional Mobility Min A   (with walker) for few steps forward and backward; unsteadiness and one posterior loss of balance demonstrated. Patient declined further functional mobility secondary to increased abdominal pain; nursing aware. Mod I - with use of device, as needed/appropriate   Balance Sitting: Fair+  (at EOB)  Standing: Fair-  (with walker)  Fair+ dynamic standing balance during completion of ADLs and other functional tasks. Activity Tolerance Fair-  Limited secondary to abdominal pain. Patient will demonstrate Good understanding and consistent implementation of energy conservation techniques and work simplification techniques into ADL routines. Visual/  Perceptual WFL  Patient wears glasses, as needed. N/A   B UE Strength 4-/5  Patient will demonstrate 4+/5 B UE strength in order to maximize independence with ADLs and functional transfers. Long-Term Goal: Patient will increase functional independence to PLOF in order to allow patient to live in least restrictive environment. ROM: Additional Information:    R UE  WFL    L UE WFL      Hearing: Fair - patient wears bilateral hearing aids  Sensation: No complaints of numbness/tingling in B UEs. Tone: WFL  Edema: No    Comments: RN approved patient's participation in [address] [place] [address] activities. Upon arrival, patient supine in bed. At end of session, patient supine in bed (per patient preference) with call light and phone within reach, patient's wife present, and all lines and tubes intact. Patient would benefit from continued skilled OT to increase safety and independence with completion of ADL/IADL tasks for functional independence and quality of life. Treatment: Patient education provided regardin) abdominal splinting techniques for implementation into bed mobility, 2) safe transfer techniques, 3) importance of OOB activities. Patient verbalized understanding. Further skilled OT treatment indicated to increase patient's safety and independence with completion of ADL/IADL tasks in order to maximize patient's functional independence and quality of life. Eval Complexity: Low    Assessment of Current Deficits:   Functional Mobility [x]  ADLs [x] Strength [x]  Cognition []  Functional Transfers  [x] IADLs [x] Safety Awareness [x]  Endurance [x]  Fine Motor Coordination [] Balance [x] Vision/Perception [] Sensation []   Gross Motor Coordination [] ROM [] Delirium []                  Motor Control []    Plan of Care:   OT treatment to be provided 2-5x/week for 3-7 days to address the following, as needed, during hospitalization:  ADL Retraining [x]   Equipment Needs [x]   Neuromuscular Re-Education [x] Energy Conservation Techniques [x]  Functional Transfer Training [x] Patient and/or Family Education [x]  Functional Mobility Training [x] Environmental Modifications [x]  Cognitive Re-Training []   Compensatory Techniques for ADLs [x]  Splinting Needs []   Positioning to Improve Overall Function [x]   Therapeutic Activity [x]  Therapeutic Exercise  [x]  Visual/Perceptual: []    Delirium Prevention/Treatment  []  Other:  []    Rehab Potential: Good for established goals. Patient / Family Goal: Patient noted that he anticipates returning home.   Patient and/or family were instructed on functional diagnosis, prognosis/goals, and OT plan of

## 2020-08-07 NOTE — H&P
MD   doxazosin (CARDURA) 2 MG tablet Take 1 tablet by mouth nightly 20  Yes Carl Catalan MD   potassium chloride (KLOR-CON M) 20 MEQ extended release tablet Take 1 tablet by mouth 2 times daily (with meals)  Patient taking differently: Take 10 mEq by mouth 2 times daily (with meals)  20  Yes Carl Catalan MD   ferrous sulfate (IRON 325) 325 (65 Fe) MG tablet Take 1 tablet by mouth 2 times daily (with meals)  Patient taking differently: Take 325 mg by mouth as needed  3/31/20  Yes Osmel Garcia MD   latanoprost (XALATAN) 0.005 % ophthalmic solution Place 1 drop into the right eye nightly   Yes Historical Provider, MD   docusate sodium (COLACE) 100 MG capsule Take 100 mg by mouth 2 times daily as needed for Constipation   Yes Historical Provider, MD   lisinopril (PRINIVIL;ZESTRIL) 10 MG tablet Take 10 mg by mouth daily   Yes Historical Provider, MD   pantoprazole (PROTONIX) 40 MG tablet Take 1 tablet by mouth every morning (before breakfast) 20   Carl Catalan MD   simvastatin (ZOCOR) 20 MG tablet Take 1 tablet by mouth nightly  Patient not taking: Reported on 2020 3/31/20   Osmel Garcia MD       No Known Allergies    Family History   Problem Relation Age of Onset    Cancer Father     Heart Attack Brother        Social History     Tobacco Use    Smoking status: Former Smoker     Types: Cigarettes     Last attempt to quit: 1980     Years since quittin.6    Smokeless tobacco: Former User   Substance Use Topics    Alcohol use:  Yes     Alcohol/week: 2.0 standard drinks     Types: 2 Shots of liquor per week     Comment: occasional    Drug use: Never         Review of Systems - History obtained from the patient  General ROS: negative  Psychological ROS: negative  Ophthalmic ROS: negative  ENT ROS: negative  Allergy and Immunology ROS: negative  Hematological and Lymphatic ROS: negative  Endocrine ROS: negative  Respiratory ROS: no cough, shortness of breath, or wheezing  Cardiovascular ROS: no chest pain or dyspnea on exertion  Gastrointestinal ROS: no abdominal pain, change in bowel habits, or black or bloody stools  Genito-Urinary ROS: no dysuria, trouble voiding, or hematuria  Musculoskeletal ROS: negative  Neurological ROS: no TIA or stroke symptoms  Dermatological ROS: negative      PHYSICAL EXAM:    Vitals:    20 2215   BP: 138/62   Pulse: 76   Resp: 18   Temp: 98.2 °F (36.8 °C)   SpO2: 95%       General Appearance:  awake, alert, oriented, in no acute distress  Skin:  Skin color, texture, turgor normal. No rashes or lesions. Head/face:  NCAT  Eyes:  No gross abnormalities. Lungs:  Normal expansion. Clear to auscultation. No rales, rhonchi, or wheezing. Heart:  Heart sounds are normal.  Regular rate and rhythm without murmur, gallop or rub. Abdomen:  Soft, non-tender, normal bowel sounds. No bruits, organomegaly or masses. Extremities: Extremities warm to touch, pink, with no edema. Neurologic:   negative      LABS:  CBC  Recent Labs     20  0812  20  0430   WBC 7.8  --   --    HGB 6.8*   < > 6.8*   HCT 22.2*   < > 21.4*     --   --     < > = values in this interval not displayed. BMP  Recent Labs     20  0430   *   K 5.0   CL 96*   CO2 22   BUN 36*   CREATININE 1.2   CALCIUM 9.2     Liver Function  Recent Labs     20  0812   LIPASE 254*   BILITOT 0.2   AST 13   ALT <5   ALKPHOS 54   PROT 6.9   LABALBU 3.2*     No results for input(s): LACTATE in the last 72 hours.   Recent Labs     20  0812   INR 1.1       RADIOLOGY    Ct Abdomen Pelvis W Iv Contrast Additional Contrast? None    Result Date: 2020  Patient MRN:  [id number] : 3/10/1931 Age: 80 years Gender: Male Order Date:  2020 8:42 AM EXAM: CT ABDOMEN PELVIS W IV CONTRAST NUMBER OF IMAGES \ views:  397 INDICATION:  LLQ pain, hernia surgery yesterday, eval for blood in abdomen COMPARISON: 20 Max 2020 TECHNIQUE: Axial duodenal ulcer. See above. 3. 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 Xr Chest Portable    Result Date: 2020  Patient MRN:  [id number] : 3/10/1931 Age: 80 years Gender: Male Order Date:  2020 8:42 AM EXAM: XR CHEST PORTABLE one image INDICATION:  eval for free air eval for free air COMPARISON: 2020 FINDINGS: There is borderline cardiac size. There is minimal atelectasis/infiltrates in the lung bases left more than right. Small pleural effusions are noted. No discrete free air is identified under the diaphragm in the semierect image. Degenerative changes are noted in the shoulders. Minimal atelectasis/infiltrates in lung bases likely pneumonia. No discrete free air is identified. If concern persists, consider CT scan of the abdomen and pelvis. ASSESSMENT:  80 y.o. male with evaluation of weakness, fatigue, and syncopal episodes at home    PLAN:  -recent post -op pt  - giving 1 unit of blood  - most likely equilibrating after surgery   - will continue to monitor  - will discuss with Dr. Roman Syed    The patient was seen and examined and the chart was reviewed. I agree with the findings. Unfortunately, the patient obviously had some bleeding postoperatively. The patient will continue to have the area followed. At this point time no further intervention is warranted.           Electronically signed by Bettie Sibley DO on 20 at 6:47 AM EDT

## 2020-08-07 NOTE — PROGRESS NOTES
Admit Date: 8/6/2020      Subjective:     Patient with no complaints at present. Feeling better since transfusion and IV fluids. Objective:   I/O last 3 completed shifts: In: 0681 [P.O.:720; I.V.:321; Blood:115]  Out: 500 [Urine:500]  No intake/output data recorded. BP (!) 138/58   Pulse 70   Temp 98.2 °F (36.8 °C) (Oral)   Resp 18   Ht 5' 4\" (1.626 m)   Wt 135 lb (61.2 kg)   SpO2 95%   BMI 23.17 kg/m²     CARDIAC MONITOR: NSR    GENERAL APPEARANCE:  Alert, cooperative and in no acute distress. HEENT:   PERRLA, EOMI, Nonicteric sclera. Oral mucosa moist without lesions. Nares patent. NECK:    No thyromegaly, cervical LA, or carotid bruits. No JVD. LUNGS:    CTA bilaterally. No rales or wheeze. HEART:   Regular rate and rhythm, S1, S2 normal, no MRG. ABDOMEN:    Soft, mild tenderness over LLQ, normal BS. No masses or HSM. BACK:    No CVAT  NEURO:   A&O x 3. CN II-XII, motor, sensory, & DTR's grossly intact. EXTREMITIES:  Symmetrical without clubbing cyanosis or edema. Support stockings on. SKIN:    Nonicteric with normal turgor. No rash.       LABS:  CBC with Differential:    Lab Results   Component Value Date    WBC 7.8 08/06/2020    HGB 6.8 08/07/2020    HCT 21.4 08/07/2020     08/06/2020     BMP:    Lab Results   Component Value Date     08/07/2020    K 5.0 08/07/2020    K 4.0 04/24/2020    CL 96 08/07/2020    CO2 22 08/07/2020    BUN 36 08/07/2020    CREATININE 1.2 08/07/2020    CALCIUM 9.2 08/07/2020    LABGLOM 57 08/07/2020     PT/INR:    Lab Results   Component Value Date    PROTIME 12.5 08/06/2020    INR 1.1 08/06/2020     Lab Results   Component Value Date    GLUCOSE 95 08/07/2020    GLUCOSE 118 08/06/2020    GLUCOSE 146 08/06/2020       MEDS:    Scheduled Meds:   sodium chloride  20 mL Intravenous Once    allopurinol  300 mg Oral Daily    doxazosin  2 mg Oral Nightly    ferrous sulfate  325 mg Oral BID WC    furosemide  20 mg Oral Daily    lactulose  10 g Oral Daily  latanoprost  1 drop Right Eye Nightly    lisinopril  10 mg Oral Daily    Vitamin D  3,000 Units Oral Daily    spironolactone  37.5 mg Oral Daily     Continuous Infusions:  PRN Meds:sodium chloride flush, HYDROcodone-acetaminophen, ondansetron, docusate sodium      Assessment:     Active Problems:    Acute blood loss anemia - s/p laparoscopic L  inguinal hernia repair 8/5/2020. Currently being transfused w 2 units PRBC's    Pelvic hematoma. HTN (hypertension) - no orthostatic drop this past shift. Acute kidney injury ([address] - improved (BUN/Creat 40/1.5 in ER - 36/1.2 this am)    Hyperkalemia - improved (5.8 in ER - 5.0 this am)    Hyponatremia (125 IN er - 130 this am). Plan:       Continue same plan and orders. Q&A with patient. His normal K supplement on hold. Monitor labs.     Noe West  8/7/2020  7:13 AM

## 2020-08-07 NOTE — CARE COORDINATION
Social Work / Discharge Planning : SW attempted to meet with patient but patient soundly sleeping. SW familiar with patient/ spouse from previous admit. SW spoke to patient spouse for initial assessment. Patient resides with his spouse in a two story  home. Patient uses a wheeled walker. Patient [facility] a h[facility] with [facility] [employer] [facility] and [facility]. Patient also has a hx at [facility]. Discharge goal is HOME. Await therapy input when appropriate. Spouse stated she maybe receptive to [address] is absolutely needed and wants to await needs at discharge. SW to follow.  Electronically signed by HAYLEY Armendariz on 8/7/20 at 11:13 AM EDT

## 2020-08-08 VITALS
OXYGEN SATURATION: 95 % | HEIGHT: 64 IN | BODY MASS INDEX: 23.97 KG/M2 | HEART RATE: 112 BPM | DIASTOLIC BLOOD PRESSURE: 63 MMHG | SYSTOLIC BLOOD PRESSURE: 134 MMHG | RESPIRATION RATE: 16 BRPM | WEIGHT: 140.4 LBS | TEMPERATURE: 98.2 F

## 2020-08-08 PROBLEM — T81.9XXA POSTOPERATIVE OR SURGICAL COMPLICATION, INITIAL ENCOUNTER: Status: ACTIVE | Noted: 2020-08-08

## 2020-08-08 LAB
ANION GAP SERPL CALCULATED.3IONS-SCNC: 10 MMOL/L (ref 7–16)
BUN BLDV-MCNC: 28 MG/DL (ref 8–23)
CALCIUM SERPL-MCNC: 9.5 MG/DL (ref 8.6–10.2)
CHLORIDE BLD-SCNC: 97 MMOL/L (ref 98–107)
CO2: 24 MMOL/L (ref 22–29)
CREAT SERPL-MCNC: 1 MG/DL (ref 0.7–1.2)
GFR AFRICAN AMERICAN: >60
GFR NON-AFRICAN AMERICAN: >60 ML/MIN/1.73
GLUCOSE BLD-MCNC: 107 MG/DL (ref 74–99)
HCT VFR BLD CALC: 20.3 % (ref 37–54)
HCT VFR BLD CALC: 22.1 % (ref 37–54)
HCT VFR BLD CALC: 25.6 % (ref 37–54)
HEMOGLOBIN: 6.3 G/DL (ref 12.5–16.5)
HEMOGLOBIN: 7.1 G/DL (ref 12.5–16.5)
HEMOGLOBIN: 7.7 G/DL (ref 12.5–16.5)
POTASSIUM SERPL-SCNC: 5 MMOL/L (ref 3.5–5)
SODIUM BLD-SCNC: 131 MMOL/L (ref 132–146)

## 2020-08-08 PROCEDURE — 36415 COLL VENOUS BLD VENIPUNCTURE: CPT

## 2020-08-08 PROCEDURE — 97161 PT EVAL LOW COMPLEX 20 MIN: CPT

## 2020-08-08 PROCEDURE — 2580000003 HC RX 258: Performed by: INTERNAL MEDICINE

## 2020-08-08 PROCEDURE — 6370000000 HC RX 637 (ALT 250 FOR IP): Performed by: SURGERY

## 2020-08-08 PROCEDURE — 80048 BASIC METABOLIC PNL TOTAL CA: CPT

## 2020-08-08 PROCEDURE — 99233 SBSQ HOSP IP/OBS HIGH 50: CPT | Performed by: INTERNAL MEDICINE

## 2020-08-08 PROCEDURE — 85014 HEMATOCRIT: CPT

## 2020-08-08 PROCEDURE — 85018 HEMOGLOBIN: CPT

## 2020-08-08 PROCEDURE — 2060000000 HC ICU INTERMEDIATE R&B

## 2020-08-08 PROCEDURE — 6370000000 HC RX 637 (ALT 250 FOR IP): Performed by: FAMILY MEDICINE

## 2020-08-08 RX ORDER — FERROUS SULFATE 325(65) MG
325 TABLET ORAL
Status: DISCONTINUED | OUTPATIENT
Start: 2020-08-09 | End: 2020-08-09 | Stop reason: HOSPADM

## 2020-08-08 RX ORDER — 0.9 % SODIUM CHLORIDE 0.9 %
1000 INTRAVENOUS SOLUTION INTRAVENOUS ONCE
Status: COMPLETED | OUTPATIENT
Start: 2020-08-08 | End: 2020-08-08

## 2020-08-08 RX ADMIN — DOXAZOSIN 2 MG: 2 TABLET ORAL at 20:00

## 2020-08-08 RX ADMIN — ALLOPURINOL 300 MG: 300 TABLET ORAL at 08:36

## 2020-08-08 RX ADMIN — SODIUM CHLORIDE 1000 ML: 9 INJECTION, SOLUTION INTRAVENOUS at 11:45

## 2020-08-08 RX ADMIN — LATANOPROST 1 DROP: 50 SOLUTION/ DROPS OPHTHALMIC at 19:59

## 2020-08-08 RX ADMIN — HYDROCODONE BITARTRATE AND ACETAMINOPHEN 1 TABLET: 10; 325 TABLET ORAL at 08:36

## 2020-08-08 RX ADMIN — VITAMIN D, TAB 1000IU (100/BT) 3000 UNITS: 25 TAB at 08:36

## 2020-08-08 RX ADMIN — FERROUS SULFATE TAB 325 MG (65 MG ELEMENTAL FE) 325 MG: 325 (65 FE) TAB at 08:36

## 2020-08-08 ASSESSMENT — PAIN SCALES - GENERAL
PAINLEVEL_OUTOF10: 0
PAINLEVEL_OUTOF10: 0
PAINLEVEL_OUTOF10: 4

## 2020-08-08 NOTE — PLAN OF CARE
Problem: Falls - Risk of:  Goal: Will remain free from falls  Description: Will remain free from falls  8/8/2020 1740 by German Serrano RN  Outcome: Met This Shift  8/8/2020 0440 by Humberto Mendes RN  Outcome: Met This Shift  Goal: Absence of physical injury  Description: Absence of physical injury  8/8/2020 1740 by German Serrano RN  Outcome: Met This Shift  8/8/2020 0440 by Humberto Mendes RN  Outcome: Met This Shift     Problem:  Activity:  Goal: Fatigue will decrease  Description: Fatigue will decrease  8/8/2020 1740 by German Serrano RN  Outcome: Met This Shift  8/8/2020 0440 by Humberto Mendes RN  Outcome: Met This Shift  Goal: Ability to tolerate increased activity will improve  Description: Ability to tolerate increased activity will improve  8/8/2020 1740 by German Serrano RN  Outcome: Met This Shift  8/8/2020 0440 by Humberto Mendes RN  Outcome: Met This Shift  Goal: Ability to maintain optimal joint mobility will improve  Description: Ability to maintain optimal joint mobility will improve  8/8/2020 1740 by German Serrano RN  Outcome: Met This Shift  8/8/2020 0440 by Humberto Mendes RN  Outcome: Met This Shift

## 2020-08-08 NOTE — PROGRESS NOTES
Physical Therapy    Facility/Department: [address] INTERNAL MEDICINE 2  Initial Assessment    NAME: Fernie Weston  : 3/10/1931  MRN: [id number]    Date of Service: 2020       Patient Diagnosis(es): The primary encounter diagnosis was Syncope and collapse. Diagnoses of Pelvic hematoma, male, VALERIE (acute kidney injury) ([facility] [address] Hyperkalemia, Dehydration, S/P hernia surgery, Nausea and vomiting, intractability of vomiting not specified, unspecified vomiting type, Orthostatic syncope, Anemia, unspecified type, and Hemorrhagic shock ([address] were also pertinent to this visit. has a past medical history of Abnormal brain MRI, Episode of syncope, GI bleed, and Hypertension. has a past surgical history that includes hernia repair; joint replacement (Bilateral); Carpal tunnel release (Bilateral); eye surgery; back surgery; Upper gastrointestinal endoscopy (N/A, 2020); and Colonoscopy (N/A, 2020). Name: Fernie Weston  : 3/10/1931  MRN: [id number]    Referring Provider:    Annalee Patel RN          Date of Service: 2020    Evaluating PT:  Lyssa Seay PT [id number]    Room #:  7362/7446-U  Diagnosis:  Pelvic hematoma  PMHx/PSHx:  Recent robotic inguinal hernia repair at [facility] (2020)  Precautions:  Fall risk  Equipment Needs:  None. Pt reported owing a walker. SUBJECTIVE:    Pt lives with his wife and son in a 2 story home with 2 stairs to enter and 1 rail. Bed and bath is on second floor. Stair glide to the second floor and to the basement. Pt ambulated with walker PTA. Son reported pt has assist when on the steps. OBJECTIVE:   Initial Evaluation  Date:  Treatment Short Term/ Long Term   Goals   Was pt agreeable to Eval/treatment? yes     Does pt have pain? groin     Bed Mobility  Rolling: NT  Supine to sit: NT  Sit to supine: NT  Scooting: NT  Min A   Transfers Sit to stand:  Mod A  Stand to sit: Mod A  Stand pivot: NT  Min A   Ambulation    4 feet forward/backward with w/w Max A.  PT with posterior lean when stnading  25+ feet with w/w Min A    Stair negotiation: ascended and descended  NT     ROM BLE:  WFL     Strength BLE:  Grossly 3/5  Grossly 4/5   Balance Sitting EOB:  NT  Dynamic Standing: Max A with w/w  Sitting EOB:  SBA  Dynamic Standing:  Min A with w/w   AM-PAC 6 Clicks 89/54       Pt is A & O x 3  Sensation:  Pt denies numbness and tingling to extremities      Patient education  Pt educated on PT objectives during eval and while in the hospital, hand placement during transfers, body mechanics when standing. Patient response to education:   Pt verbalized understanding Pt demonstrated skill Pt requires further education in this area   Yes    yes     ASSESSMENT:    Comments:  Pt found sitting up in the recliner chair with son present. Cueing required for hand placement during transfers. Pt's LE placed in position to complete the stand. Pt with posterior lean when standing. Pt able to ambulate a couple steps forward/backward with shuffled gait. Pt with feeling mildly dizzy during functional mobility. At end of eval, pt left sitting up in the chair with call light in reach and son present. Pt's/ family goals   1. None stated    Patient and or family understand(s) diagnosis, prognosis, and plan of care. yes    PLAN:    PT care will be provided in accordance with the objectives noted above. Exercises and functional mobility practice will be used as well as appropriate assistive devices or modalities to obtain goals. Patient and family education will also be administered as needed. Frequency of treatments: 2-5x/week x 1-2 weeks. Time in  1304  Time out  1320    Evaluation Time includes thorough review of current medical information, gathering information on past medical history/social history and prior level of function, completion of standardized testing/informal observation of tasks, assessment of data and education on plan of care and goals.     CPT codes:  [x] Low Complexity PT evaluation 00656  [] Moderate Complexity PT evaluation 27753  [] High Complexity PT evaluation 71412  [] PT Re-evaluation 83979  [] Gait training 19699 _ minutes  [] Therapeutic activities 76062 _ minutes  [] Therapeutic exercises [id number] [facility] [place], [place] [id number]

## 2020-08-08 NOTE — PROGRESS NOTES
Department of Surgery - Adult  General Surgery  Dr. Daksha Ha's Progress Note      SUBJECTIVE: The patient is less symptomatic. OBJECTIVE      Physical    VITALS:  /64   Pulse 88   Temp 98 °F (36.7 °C) (Oral)   Resp 20   Ht 5' 4\" (1.626 m)   Wt 140 lb 6.4 oz (63.7 kg)   SpO2 95%   BMI 24.10 kg/m²   INTAKE/OUTPUT:      Intake/Output Summary (Last 24 hours) at 2020 0752  Last data filed at 2020 0743  Gross per 24 hour   Intake 924 ml   Output 1425 ml   Net -501 ml     TEMPERATURE:  Current - Temp: 98 °F (36.7 °C); Max - Temp  Av.3 °F (36.8 °C)  Min: 98 °F (36.7 °C)  Max: 98.9 °F (37.2 °C)  RESPIRATIONS RANGE: Resp  Av.2  Min: 18  Max: 20  PULSE RANGE: Pulse  Av.8  Min: 70  Max: 88  BLOOD PRESSURE RANGE:  Systolic (45AJV), HXH:573 , Min:119 , XMD:095   ; Diastolic (08DYT), RKL:48, Min:53, Max:72    PULSE OXIMETRY RANGE: SpO2  Av.3 %  Min: 94 %  Max: 95 %  CONSTITUTIONAL:  awake, alert, cooperative, no apparent distress, and appears stated age  ABDOMEN: The patient has fullness in the suprapubic and left lower quadrant. Likely related to the equivalent of a rectus sheath hematoma.   Data  CBC with Differential:    Lab Results   Component Value Date    WBC 7.8 2020    RBC 2.31 2020    HGB 7.1 2020    HCT 22.1 2020     2020    MCV 96.1 2020    MCH 29.4 2020    MCHC 30.6 2020    RDW 15.4 2020    NRBC 0.0 2020    BLASTSPCT 0.9 2020    METASPCT 0.9 2020    LYMPHOPCT 15.3 2020    PROMYELOPCT 0.4 2020    MONOPCT 17.2 2020    MYELOPCT 0.9 2020    BASOPCT 0.0 2020    MONOSABS 1.34 2020    LYMPHSABS 1.19 2020    EOSABS 0.00 2020    BASOSABS 0.00 2020     CMP:    Lab Results   Component Value Date     2020    K 5.0 2020    K 4.0 2020    CL 97 2020    CO2 24 2020    BUN 28 2020    CREATININE 1.0 2020

## 2020-08-08 NOTE — PROGRESS NOTES
08/08/20  0400   WBC 7.8  --   --   --   --    RBC 2.31*  --   --   --   --    HGB 6.8*   < > 6.8* 8.4* 7.1*   HCT 22.2*   < > 21.4* 26.3* 22.1*   MCV 96.1  --   --   --   --    MCH 29.4  --   --   --   --    MCHC 30.6*  --   --   --   --    RDW 15.4*  --   --   --   --      --   --   --   --    MPV 12.5*  --   --   --   --     < > = values in this interval not displayed. Assessment:    Active Problems:    HTN (hypertension)    Acute kidney injury ([address]    Acute blood loss anemia    Pelvic hematoma in male    Hyperkalemia    Hyponatremia  Resolved Problems:    * No resolved hospital problems. *      Plan:  1. Acute blood loss anemia s/p laparoscopic Left inguinal hernia repair on 8/5/2020 at [facility]. Patient reporting later that night weakness, fatigue, s/p syncopal episodes. Presented to ER following am with hg 6.8. CT abdomen 13 cm left pelvic hematoma. S/p PRBCs. Following for medical management. Surgery admitting. Pt reporting ongoing abdominal tenderness left side. C/o feeling nauseated with movement/ C/o fatigue. H & H noted to slightly downtrend this am. May need bleeding scan vs reimaging. Will discuss with surgery. Monitor H & H      2. Post operative hematoma following inguinal hernia repair: monitor H & H s/p PRBCs    3. Orthostatic hypotension with h/o HTN: pt noted to be positive last night. Currently not on fluid. Will recheck. Hold lasix/ aldactone. Hold ace. Significantly orthostatic today- IVF bolus. 4. VALERIE: creatinine 1.5 on admission- trending down. Currently 1.0    5. Hyperkalemia: creatinine 5.8 on admission. Trending down. 5.0      NOTE: This report was transcribed using voice recognition software. Every effort was made to ensure accuracy; however, inadvertent computerized transcription errors may be present.   Electronically signed by JANET Joseph on 8/8/2020 at 8:48 AM

## 2020-08-09 ENCOUNTER — HOSPITAL ENCOUNTER (INPATIENT)
Age: 85
LOS: 5 days | Discharge: HOME HEALTH | DRG: 907 | End: 2020-08-14
Attending: SURGERY | Admitting: SURGERY
Payer: MEDICARE

## 2020-08-09 ENCOUNTER — APPOINTMENT (OUTPATIENT)
Dept: GENERAL RADIOLOGY | Age: 85
DRG: 907 | End: 2020-08-09
Attending: SURGERY
Payer: MEDICARE

## 2020-08-09 ENCOUNTER — APPOINTMENT (OUTPATIENT)
Dept: INTERVENTIONAL RADIOLOGY/VASCULAR | Age: 85
DRG: 907 | End: 2020-08-09
Attending: SURGERY
Payer: MEDICARE

## 2020-08-09 ENCOUNTER — APPOINTMENT (OUTPATIENT)
Dept: CT IMAGING | Age: 85
DRG: 907 | End: 2020-08-09
Attending: SURGERY
Payer: MEDICARE

## 2020-08-09 PROBLEM — E44.1 MILD PROTEIN-CALORIE MALNUTRITION (HCC): Chronic | Status: ACTIVE | Noted: 2020-08-09

## 2020-08-09 LAB
ABO/RH: NORMAL
ABO/RH: NORMAL
ALBUMIN SERPL-MCNC: 2.7 G/DL (ref 3.5–5.2)
ALBUMIN SERPL-MCNC: 2.9 G/DL (ref 3.5–5.2)
ALP BLD-CCNC: 43 U/L (ref 40–129)
ALP BLD-CCNC: 47 U/L (ref 40–129)
ALT SERPL-CCNC: <5 U/L (ref 0–40)
ALT SERPL-CCNC: <5 U/L (ref 0–40)
ANGLE (CLOT STRENGTH): 70.8 DEGREE (ref 59–74)
ANION GAP SERPL CALCULATED.3IONS-SCNC: 13 MMOL/L (ref 7–16)
ANION GAP SERPL CALCULATED.3IONS-SCNC: 14 MMOL/L (ref 7–16)
ANISOCYTOSIS: ABNORMAL
ANTIBODY SCREEN: NORMAL
AST SERPL-CCNC: 11 U/L (ref 0–39)
AST SERPL-CCNC: 9 U/L (ref 0–39)
BASOPHILIC STIPPLING: ABNORMAL
BASOPHILS ABSOLUTE: 0 E9/L (ref 0–0.2)
BASOPHILS RELATIVE PERCENT: 0.1 % (ref 0–2)
BILIRUB SERPL-MCNC: 0.3 MG/DL (ref 0–1.2)
BILIRUB SERPL-MCNC: 0.4 MG/DL (ref 0–1.2)
BLOOD BANK DISPENSE STATUS: NORMAL
BLOOD BANK PRODUCT CODE: NORMAL
BPU ID: NORMAL
BUN BLDV-MCNC: 30 MG/DL (ref 8–23)
BUN BLDV-MCNC: 31 MG/DL (ref 8–23)
CALCIUM IONIZED: 1.32 MMOL/L (ref 1.15–1.33)
CALCIUM IONIZED: 1.33 MMOL/L (ref 1.15–1.33)
CALCIUM SERPL-MCNC: 8.9 MG/DL (ref 8.6–10.2)
CALCIUM SERPL-MCNC: 9.3 MG/DL (ref 8.6–10.2)
CHLORIDE BLD-SCNC: 100 MMOL/L (ref 98–107)
CHLORIDE BLD-SCNC: 95 MMOL/L (ref 98–107)
CO2: 20 MMOL/L (ref 22–29)
CO2: 21 MMOL/L (ref 22–29)
CREAT SERPL-MCNC: 1.3 MG/DL (ref 0.7–1.2)
CREAT SERPL-MCNC: 1.4 MG/DL (ref 0.7–1.2)
DESCRIPTION BLOOD BANK: NORMAL
EOSINOPHILS ABSOLUTE: 0 E9/L (ref 0.05–0.5)
EOSINOPHILS RELATIVE PERCENT: 0 % (ref 0–6)
EPL-TEG: 0 % (ref 0–15)
G-TEG: 10.9 K D/SC (ref 4.5–11)
GFR AFRICAN AMERICAN: 58
GFR AFRICAN AMERICAN: >60
GFR NON-AFRICAN AMERICAN: 48 ML/MIN/1.73
GFR NON-AFRICAN AMERICAN: 52 ML/MIN/1.73
GLUCOSE BLD-MCNC: 119 MG/DL (ref 74–99)
GLUCOSE BLD-MCNC: 121 MG/DL (ref 74–99)
HCT VFR BLD CALC: 18 % (ref 37–54)
HCT VFR BLD CALC: 21.3 % (ref 37–54)
HCT VFR BLD CALC: 23.3 % (ref 37–54)
HCT VFR BLD CALC: 23.7 % (ref 37–54)
HEMOGLOBIN: 5.5 G/DL (ref 12.5–16.5)
HEMOGLOBIN: 6.9 G/DL (ref 12.5–16.5)
HEMOGLOBIN: 7.5 G/DL (ref 12.5–16.5)
HEMOGLOBIN: 7.7 G/DL (ref 12.5–16.5)
HYPOCHROMIA: ABNORMAL
INR BLD: 1.2
K (CLOTTING TIME): 1.4 MIN (ref 1–3)
LY30 (FIBRINOLYSIS): 0 % (ref 0–8)
LYMPHOCYTES ABSOLUTE: 1.39 E9/L (ref 1.5–4)
LYMPHOCYTES RELATIVE PERCENT: 7 % (ref 20–42)
MA (MAX AMPLITUDE): 68.5 MM (ref 50–70)
MAGNESIUM: 1.5 MG/DL (ref 1.6–2.6)
MAGNESIUM: 2.2 MG/DL (ref 1.6–2.6)
MCH RBC QN AUTO: 29.4 PG (ref 26–35)
MCH RBC QN AUTO: 29.8 PG (ref 26–35)
MCH RBC QN AUTO: 30.4 PG (ref 26–35)
MCHC RBC AUTO-ENTMCNC: 30.6 % (ref 32–34.5)
MCHC RBC AUTO-ENTMCNC: 31.6 % (ref 32–34.5)
MCHC RBC AUTO-ENTMCNC: 32.4 % (ref 32–34.5)
MCV RBC AUTO: 93.8 FL (ref 80–99.9)
MCV RBC AUTO: 94 FL (ref 80–99.9)
MCV RBC AUTO: 96.3 FL (ref 80–99.9)
MONOCYTES ABSOLUTE: 4.38 E9/L (ref 0.1–0.95)
MONOCYTES RELATIVE PERCENT: 21.7 % (ref 2–12)
NEUTROPHILS ABSOLUTE: 14.13 E9/L (ref 1.8–7.3)
NEUTROPHILS RELATIVE PERCENT: 71.3 % (ref 43–80)
PDW BLD-RTO: 15.3 FL (ref 11.5–15)
PDW BLD-RTO: 15.4 FL (ref 11.5–15)
PDW BLD-RTO: 16.2 FL (ref 11.5–15)
PHOSPHORUS: 3.9 MG/DL (ref 2.5–4.5)
PHOSPHORUS: 4.1 MG/DL (ref 2.5–4.5)
PLATELET # BLD: 103 E9/L (ref 130–450)
PLATELET # BLD: 116 E9/L (ref 130–450)
PLATELET # BLD: 122 E9/L (ref 130–450)
PMV BLD AUTO: 11.5 FL (ref 7–12)
PMV BLD AUTO: 11.6 FL (ref 7–12)
PMV BLD AUTO: 11.7 FL (ref 7–12)
POLYCHROMASIA: ABNORMAL
POTASSIUM SERPL-SCNC: 4.9 MMOL/L (ref 3.5–5)
POTASSIUM SERPL-SCNC: 4.9 MMOL/L (ref 3.5–5)
PROTHROMBIN TIME: 13.7 SEC (ref 9.3–12.4)
R (REACTION TIME): 7.6 MIN (ref 5–10)
RBC # BLD: 1.87 E12/L (ref 3.8–5.8)
RBC # BLD: 2.27 E12/L (ref 3.8–5.8)
RBC # BLD: 2.52 E12/L (ref 3.8–5.8)
SODIUM BLD-SCNC: 129 MMOL/L (ref 132–146)
SODIUM BLD-SCNC: 134 MMOL/L (ref 132–146)
TOTAL PROTEIN: 5.8 G/DL (ref 6.4–8.3)
TOTAL PROTEIN: 6.4 G/DL (ref 6.4–8.3)
WBC # BLD: 15.3 E9/L (ref 4.5–11.5)
WBC # BLD: 16.4 E9/L (ref 4.5–11.5)
WBC # BLD: 19.9 E9/L (ref 4.5–11.5)

## 2020-08-09 PROCEDURE — 75736 ARTERY X-RAYS PELVIS: CPT

## 2020-08-09 PROCEDURE — 6360000002 HC RX W HCPCS: Performed by: RADIOLOGY

## 2020-08-09 PROCEDURE — 86923 COMPATIBILITY TEST ELECTRIC: CPT

## 2020-08-09 PROCEDURE — G0269 OCCLUSIVE DEVICE IN VEIN ART: HCPCS

## 2020-08-09 PROCEDURE — 36592 COLLECT BLOOD FROM PICC: CPT

## 2020-08-09 PROCEDURE — 2580000003 HC RX 258: Performed by: STUDENT IN AN ORGANIZED HEALTH CARE EDUCATION/TRAINING PROGRAM

## 2020-08-09 PROCEDURE — 6360000002 HC RX W HCPCS: Performed by: SURGERY

## 2020-08-09 PROCEDURE — 6360000004 HC RX CONTRAST MEDICATION: Performed by: RADIOLOGY

## 2020-08-09 PROCEDURE — B41J1ZZ FLUOROSCOPY OF OTHER LOWER ARTERIES USING LOW OSMOLAR CONTRAST: ICD-10-PCS | Performed by: RADIOLOGY

## 2020-08-09 PROCEDURE — 85018 HEMOGLOBIN: CPT

## 2020-08-09 PROCEDURE — 86900 BLOOD TYPING SEROLOGIC ABO: CPT

## 2020-08-09 PROCEDURE — 86901 BLOOD TYPING SEROLOGIC RH(D): CPT

## 2020-08-09 PROCEDURE — 85384 FIBRINOGEN ACTIVITY: CPT

## 2020-08-09 PROCEDURE — 85610 PROTHROMBIN TIME: CPT

## 2020-08-09 PROCEDURE — 37244 VASC EMBOLIZE/OCCLUDE BLEED: CPT

## 2020-08-09 PROCEDURE — 71045 X-RAY EXAM CHEST 1 VIEW: CPT

## 2020-08-09 PROCEDURE — 74174 CTA ABD&PLVS W/CONTRAST: CPT

## 2020-08-09 PROCEDURE — 36430 TRANSFUSION BLD/BLD COMPNT: CPT

## 2020-08-09 PROCEDURE — 84100 ASSAY OF PHOSPHORUS: CPT

## 2020-08-09 PROCEDURE — 36556 INSERT NON-TUNNEL CV CATH: CPT

## 2020-08-09 PROCEDURE — 80053 COMPREHEN METABOLIC PANEL: CPT

## 2020-08-09 PROCEDURE — 85027 COMPLETE CBC AUTOMATED: CPT

## 2020-08-09 PROCEDURE — 87081 CULTURE SCREEN ONLY: CPT

## 2020-08-09 PROCEDURE — 2580000003 HC RX 258: Performed by: SURGERY

## 2020-08-09 PROCEDURE — 36248 INS CATH ABD/L-EXT ART ADDL: CPT

## 2020-08-09 PROCEDURE — 36415 COLL VENOUS BLD VENIPUNCTURE: CPT

## 2020-08-09 PROCEDURE — P9016 RBC LEUKOCYTES REDUCED: HCPCS

## 2020-08-09 PROCEDURE — 86920 COMPATIBILITY TEST SPIN: CPT

## 2020-08-09 PROCEDURE — 6370000000 HC RX 637 (ALT 250 FOR IP): Performed by: SURGERY

## 2020-08-09 PROCEDURE — 6370000000 HC RX 637 (ALT 250 FOR IP): Performed by: STUDENT IN AN ORGANIZED HEALTH CARE EDUCATION/TRAINING PROGRAM

## 2020-08-09 PROCEDURE — 2780000010 IR SEL ART CATH ABD/PELV/LOWER EXT INIT 2ND ORDER

## 2020-08-09 PROCEDURE — 85347 COAGULATION TIME ACTIVATED: CPT

## 2020-08-09 PROCEDURE — 2000000000 HC ICU R&B

## 2020-08-09 PROCEDURE — 85025 COMPLETE CBC W/AUTO DIFF WBC: CPT

## 2020-08-09 PROCEDURE — 85576 BLOOD PLATELET AGGREGATION: CPT

## 2020-08-09 PROCEDURE — 83735 ASSAY OF MAGNESIUM: CPT

## 2020-08-09 PROCEDURE — 04L23DZ OCCLUSION OF GASTRIC ARTERY WITH INTRALUMINAL DEVICE, PERCUTANEOUS APPROACH: ICD-10-PCS | Performed by: RADIOLOGY

## 2020-08-09 PROCEDURE — 99223 1ST HOSP IP/OBS HIGH 75: CPT | Performed by: SURGERY

## 2020-08-09 PROCEDURE — 82330 ASSAY OF CALCIUM: CPT

## 2020-08-09 PROCEDURE — 99221 1ST HOSP IP/OBS SF/LOW 40: CPT | Performed by: SURGERY

## 2020-08-09 PROCEDURE — 2500000003 HC RX 250 WO HCPCS: Performed by: RADIOLOGY

## 2020-08-09 PROCEDURE — 86850 RBC ANTIBODY SCREEN: CPT

## 2020-08-09 PROCEDURE — 75710 ARTERY X-RAYS ARM/LEG: CPT

## 2020-08-09 PROCEDURE — 36246 INS CATH ABD/L-EXT ART 2ND: CPT

## 2020-08-09 PROCEDURE — 85014 HEMATOCRIT: CPT

## 2020-08-09 RX ORDER — SODIUM CHLORIDE 0.9 % (FLUSH) 0.9 %
10 SYRINGE (ML) INJECTION PRN
Status: DISCONTINUED | OUTPATIENT
Start: 2020-08-09 | End: 2020-08-14 | Stop reason: HOSPADM

## 2020-08-09 RX ORDER — ONDANSETRON 2 MG/ML
4 INJECTION INTRAMUSCULAR; INTRAVENOUS EVERY 6 HOURS PRN
Status: DISCONTINUED | OUTPATIENT
Start: 2020-08-09 | End: 2020-08-09 | Stop reason: SDUPTHER

## 2020-08-09 RX ORDER — ALLOPURINOL 300 MG/1
300 TABLET ORAL DAILY
Status: DISCONTINUED | OUTPATIENT
Start: 2020-08-09 | End: 2020-08-14 | Stop reason: HOSPADM

## 2020-08-09 RX ORDER — POTASSIUM CHLORIDE 750 MG/1
10 TABLET, EXTENDED RELEASE ORAL 2 TIMES DAILY WITH MEALS
Status: DISCONTINUED | OUTPATIENT
Start: 2020-08-09 | End: 2020-08-14 | Stop reason: HOSPADM

## 2020-08-09 RX ORDER — SENNA AND DOCUSATE SODIUM 50; 8.6 MG/1; MG/1
1 TABLET, FILM COATED ORAL 2 TIMES DAILY
Status: DISCONTINUED | OUTPATIENT
Start: 2020-08-09 | End: 2020-08-12

## 2020-08-09 RX ORDER — FUROSEMIDE 20 MG/1
20 TABLET ORAL DAILY
Status: DISCONTINUED | OUTPATIENT
Start: 2020-08-09 | End: 2020-08-14 | Stop reason: HOSPADM

## 2020-08-09 RX ORDER — ACETAMINOPHEN 325 MG/1
650 TABLET ORAL EVERY 6 HOURS PRN
Status: DISCONTINUED | OUTPATIENT
Start: 2020-08-09 | End: 2020-08-14 | Stop reason: HOSPADM

## 2020-08-09 RX ORDER — DOXAZOSIN 2 MG/1
2 TABLET ORAL NIGHTLY
Status: DISCONTINUED | OUTPATIENT
Start: 2020-08-09 | End: 2020-08-14 | Stop reason: HOSPADM

## 2020-08-09 RX ORDER — SODIUM CHLORIDE 9 MG/ML
INJECTION, SOLUTION INTRAVENOUS CONTINUOUS
Status: DISCONTINUED | OUTPATIENT
Start: 2020-08-09 | End: 2020-08-09

## 2020-08-09 RX ORDER — VITAMIN B COMPLEX
3000 TABLET ORAL DAILY
Status: DISCONTINUED | OUTPATIENT
Start: 2020-08-09 | End: 2020-08-14 | Stop reason: HOSPADM

## 2020-08-09 RX ORDER — ATORVASTATIN CALCIUM 10 MG/1
10 TABLET, FILM COATED ORAL DAILY
Status: DISCONTINUED | OUTPATIENT
Start: 2020-08-09 | End: 2020-08-14 | Stop reason: HOSPADM

## 2020-08-09 RX ORDER — ONDANSETRON 2 MG/ML
4 INJECTION INTRAMUSCULAR; INTRAVENOUS EVERY 6 HOURS PRN
Status: DISCONTINUED | OUTPATIENT
Start: 2020-08-09 | End: 2020-08-14 | Stop reason: HOSPADM

## 2020-08-09 RX ORDER — SODIUM CHLORIDE 0.9 % (FLUSH) 0.9 %
10 SYRINGE (ML) INJECTION EVERY 12 HOURS SCHEDULED
Status: DISCONTINUED | OUTPATIENT
Start: 2020-08-09 | End: 2020-08-14 | Stop reason: HOSPADM

## 2020-08-09 RX ORDER — PANTOPRAZOLE SODIUM 40 MG/1
40 TABLET, DELAYED RELEASE ORAL
Status: DISCONTINUED | OUTPATIENT
Start: 2020-08-09 | End: 2020-08-14 | Stop reason: HOSPADM

## 2020-08-09 RX ORDER — LACTULOSE 10 G/15ML
10 SOLUTION ORAL DAILY
Status: DISCONTINUED | OUTPATIENT
Start: 2020-08-09 | End: 2020-08-13

## 2020-08-09 RX ORDER — LABETALOL HYDROCHLORIDE 5 MG/ML
10 INJECTION, SOLUTION INTRAVENOUS EVERY 30 MIN PRN
Status: DISCONTINUED | OUTPATIENT
Start: 2020-08-09 | End: 2020-08-14 | Stop reason: HOSPADM

## 2020-08-09 RX ORDER — 0.9 % SODIUM CHLORIDE 0.9 %
20 INTRAVENOUS SOLUTION INTRAVENOUS ONCE
Status: DISCONTINUED | OUTPATIENT
Start: 2020-08-09 | End: 2020-08-11

## 2020-08-09 RX ORDER — POLYETHYLENE GLYCOL 3350 17 G/17G
17 POWDER, FOR SOLUTION ORAL DAILY
Status: DISCONTINUED | OUTPATIENT
Start: 2020-08-09 | End: 2020-08-12

## 2020-08-09 RX ORDER — 0.9 % SODIUM CHLORIDE 0.9 %
20 INTRAVENOUS SOLUTION INTRAVENOUS ONCE
Status: COMPLETED | OUTPATIENT
Start: 2020-08-09 | End: 2020-08-09

## 2020-08-09 RX ORDER — HYDROCODONE BITARTRATE AND ACETAMINOPHEN 10; 325 MG/1; MG/1
1 TABLET ORAL EVERY 4 HOURS PRN
Status: DISCONTINUED | OUTPATIENT
Start: 2020-08-09 | End: 2020-08-14 | Stop reason: HOSPADM

## 2020-08-09 RX ORDER — 0.9 % SODIUM CHLORIDE 0.9 %
500 INTRAVENOUS SOLUTION INTRAVENOUS ONCE
Status: COMPLETED | OUTPATIENT
Start: 2020-08-09 | End: 2020-08-09

## 2020-08-09 RX ORDER — LATANOPROST 50 UG/ML
1 SOLUTION/ DROPS OPHTHALMIC NIGHTLY
Status: DISCONTINUED | OUTPATIENT
Start: 2020-08-09 | End: 2020-08-14 | Stop reason: HOSPADM

## 2020-08-09 RX ORDER — DOCUSATE SODIUM 100 MG/1
100 CAPSULE, LIQUID FILLED ORAL 2 TIMES DAILY PRN
Status: DISCONTINUED | OUTPATIENT
Start: 2020-08-09 | End: 2020-08-14 | Stop reason: HOSPADM

## 2020-08-09 RX ORDER — ACETAMINOPHEN 650 MG/1
650 SUPPOSITORY RECTAL EVERY 6 HOURS PRN
Status: DISCONTINUED | OUTPATIENT
Start: 2020-08-09 | End: 2020-08-14 | Stop reason: HOSPADM

## 2020-08-09 RX ORDER — SPIRONOLACTONE 25 MG/1
37.5 TABLET ORAL DAILY
Status: DISCONTINUED | OUTPATIENT
Start: 2020-08-09 | End: 2020-08-14 | Stop reason: HOSPADM

## 2020-08-09 RX ORDER — MAGNESIUM SULFATE IN WATER 40 MG/ML
2 INJECTION, SOLUTION INTRAVENOUS ONCE
Status: COMPLETED | OUTPATIENT
Start: 2020-08-09 | End: 2020-08-09

## 2020-08-09 RX ORDER — LIDOCAINE HYDROCHLORIDE 20 MG/ML
INJECTION, SOLUTION INFILTRATION; PERINEURAL
Status: COMPLETED | OUTPATIENT
Start: 2020-08-09 | End: 2020-08-09

## 2020-08-09 RX ORDER — HYDRALAZINE HYDROCHLORIDE 20 MG/ML
10 INJECTION INTRAMUSCULAR; INTRAVENOUS EVERY 30 MIN PRN
Status: DISCONTINUED | OUTPATIENT
Start: 2020-08-09 | End: 2020-08-14 | Stop reason: HOSPADM

## 2020-08-09 RX ORDER — SODIUM CHLORIDE, SODIUM LACTATE, POTASSIUM CHLORIDE, CALCIUM CHLORIDE 600; 310; 30; 20 MG/100ML; MG/100ML; MG/100ML; MG/100ML
INJECTION, SOLUTION INTRAVENOUS CONTINUOUS
Status: ACTIVE | OUTPATIENT
Start: 2020-08-09 | End: 2020-08-10

## 2020-08-09 RX ORDER — FERROUS SULFATE 325(65) MG
325 TABLET ORAL
Status: DISCONTINUED | OUTPATIENT
Start: 2020-08-09 | End: 2020-08-14 | Stop reason: HOSPADM

## 2020-08-09 RX ORDER — ONDANSETRON 4 MG/1
4 TABLET, ORALLY DISINTEGRATING ORAL EVERY 8 HOURS PRN
Status: DISCONTINUED | OUTPATIENT
Start: 2020-08-09 | End: 2020-08-14 | Stop reason: HOSPADM

## 2020-08-09 RX ORDER — SODIUM CHLORIDE 0.9 % (FLUSH) 0.9 %
10 SYRINGE (ML) INJECTION PRN
Status: DISCONTINUED | OUTPATIENT
Start: 2020-08-09 | End: 2020-08-09 | Stop reason: SDUPTHER

## 2020-08-09 RX ADMIN — SPIRONOLACTONE 37.5 MG: 25 TABLET ORAL at 13:41

## 2020-08-09 RX ADMIN — SODIUM CHLORIDE, PRESERVATIVE FREE 10 ML: 5 INJECTION INTRAVENOUS at 13:44

## 2020-08-09 RX ADMIN — DOCUSATE SODIUM 50 MG AND SENNOSIDES 8.6 MG 1 TABLET: 8.6; 5 TABLET, FILM COATED ORAL at 13:41

## 2020-08-09 RX ADMIN — SODIUM CHLORIDE 20 ML: 9 INJECTION, SOLUTION INTRAVENOUS at 02:54

## 2020-08-09 RX ADMIN — Medication 5000 UNITS: at 11:11

## 2020-08-09 RX ADMIN — VITAMIN D 3000 UNITS: 25 TAB ORAL at 13:41

## 2020-08-09 RX ADMIN — HYDROCODONE BITARTRATE AND ACETAMINOPHEN 1 TABLET: 10; 325 TABLET ORAL at 21:53

## 2020-08-09 RX ADMIN — ATORVASTATIN CALCIUM 10 MG: 10 TABLET, FILM COATED ORAL at 21:47

## 2020-08-09 RX ADMIN — SODIUM CHLORIDE 500 ML: 9 INJECTION, SOLUTION INTRAVENOUS at 02:56

## 2020-08-09 RX ADMIN — SODIUM CHLORIDE, PRESERVATIVE FREE 10 ML: 5 INJECTION INTRAVENOUS at 21:47

## 2020-08-09 RX ADMIN — POTASSIUM CHLORIDE 10 MEQ: 10 TABLET, EXTENDED RELEASE ORAL at 17:37

## 2020-08-09 RX ADMIN — HYDROCODONE BITARTRATE AND ACETAMINOPHEN 1 TABLET: 10; 325 TABLET ORAL at 13:39

## 2020-08-09 RX ADMIN — MAGNESIUM SULFATE HEPTAHYDRATE 2 G: 40 INJECTION, SOLUTION INTRAVENOUS at 02:59

## 2020-08-09 RX ADMIN — IOPAMIDOL 110 ML: 755 INJECTION, SOLUTION INTRAVENOUS at 02:27

## 2020-08-09 RX ADMIN — PANTOPRAZOLE SODIUM 40 MG: 40 TABLET, DELAYED RELEASE ORAL at 13:47

## 2020-08-09 RX ADMIN — Medication 5000 UNITS: at 11:00

## 2020-08-09 RX ADMIN — SODIUM CHLORIDE, POTASSIUM CHLORIDE, SODIUM LACTATE AND CALCIUM CHLORIDE: 600; 310; 30; 20 INJECTION, SOLUTION INTRAVENOUS at 16:36

## 2020-08-09 RX ADMIN — FERROUS SULFATE TAB 325 MG (65 MG ELEMENTAL FE) 325 MG: 325 (65 FE) TAB at 13:41

## 2020-08-09 RX ADMIN — SODIUM CHLORIDE: 9 INJECTION, SOLUTION INTRAVENOUS at 01:25

## 2020-08-09 RX ADMIN — SODIUM CHLORIDE 20 ML: 9 INJECTION, SOLUTION INTRAVENOUS at 02:53

## 2020-08-09 RX ADMIN — SODIUM CHLORIDE: 9 INJECTION, SOLUTION INTRAVENOUS at 06:53

## 2020-08-09 RX ADMIN — FUROSEMIDE 20 MG: 20 TABLET ORAL at 13:42

## 2020-08-09 RX ADMIN — LIDOCAINE HYDROCHLORIDE 4 ML: 20 INJECTION, SOLUTION INFILTRATION; PERINEURAL at 11:23

## 2020-08-09 RX ADMIN — POTASSIUM CHLORIDE 10 MEQ: 10 TABLET, EXTENDED RELEASE ORAL at 13:41

## 2020-08-09 RX ADMIN — DOXAZOSIN 2 MG: 2 TABLET ORAL at 21:47

## 2020-08-09 RX ADMIN — LATANOPROST 1 DROP: 50 SOLUTION OPHTHALMIC at 23:39

## 2020-08-09 RX ADMIN — IOVERSOL 200 ML: 678 INJECTION INTRA-ARTERIAL; INTRAVENOUS at 13:12

## 2020-08-09 RX ADMIN — ALLOPURINOL 300 MG: 300 TABLET ORAL at 13:40

## 2020-08-09 RX ADMIN — LACTULOSE 10 G: 20 SOLUTION ORAL at 13:40

## 2020-08-09 RX ADMIN — POLYETHYLENE GLYCOL 3350 17 G: 17 POWDER, FOR SOLUTION ORAL at 13:42

## 2020-08-09 RX ADMIN — DOCUSATE SODIUM 50 MG AND SENNOSIDES 8.6 MG 1 TABLET: 8.6; 5 TABLET, FILM COATED ORAL at 21:47

## 2020-08-09 ASSESSMENT — PAIN SCALES - GENERAL
PAINLEVEL_OUTOF10: 0
PAINLEVEL_OUTOF10: 2
PAINLEVEL_OUTOF10: 4
PAINLEVEL_OUTOF10: 3
PAINLEVEL_OUTOF10: 5
PAINLEVEL_OUTOF10: 3
PAINLEVEL_OUTOF10: 8
PAINLEVEL_OUTOF10: 3
PAINLEVEL_OUTOF10: 8

## 2020-08-09 ASSESSMENT — PAIN DESCRIPTION - PAIN TYPE
TYPE: SURGICAL PAIN

## 2020-08-09 ASSESSMENT — PAIN DESCRIPTION - ORIENTATION
ORIENTATION: LEFT

## 2020-08-09 ASSESSMENT — PAIN DESCRIPTION - LOCATION
LOCATION: ABDOMEN;SCROTUM
LOCATION: ABDOMEN
LOCATION: ABDOMEN

## 2020-08-09 ASSESSMENT — PAIN DESCRIPTION - DESCRIPTORS
DESCRIPTORS: DULL;DISCOMFORT;ACHING
DESCRIPTORS: DULL;DISCOMFORT;ACHING
DESCRIPTORS: DULL;DISCOMFORT

## 2020-08-09 ASSESSMENT — PAIN DESCRIPTION - FREQUENCY: FREQUENCY: INTERMITTENT

## 2020-08-09 NOTE — CONSULTS
Vascular Surgery Inpatient Consultation Note      Reason for Consultation: Post hernia bleeding    HISTORY OF PRESENT ILLNESS:                The patient is a 80 y.o. male who is admitted to the hospital for treatment of groin hematoma following robotic left inguinal hernia repair 8 5. The patient developed pain in his groin associated with swelling and a drop in his hemoglobin. A CT angiogram of the abdomen and pelvis was performed that shows no extravasation into the hematoma. Vascular surgery is consulted for evaluation and treatment. IMPRESSION: Postoperative groin hematoma. I reviewed the CAT scan images and discussed the patient with Dr. Joseph Pruett. I see no contrast in the hematoma and all of the major arteries and their branches appear intact. If this is venous bleeding, it should tamponade. Agree with observation and serial hemoglobin levels. Interventional radiology consulted for catheter directed arteriogram.  I will await the reports. Patient Active Problem List   Diagnosis Code    Pneumonia due to organism J18.9    Anemia D64.9    HTN (hypertension) I10    VALERIE (acute kidney injury) ([facility] [address] N17.9    Acute hyperglycemia R73.9    Community acquired pneumonia J18.9    Aspiration pneumonia ([facility] [address] J69.0    Syncope and collapse R55    Acute blood loss anemia D62    Acute ischemic stroke (HCC) I63.9    Gross hematuria R31.0    Peripheral edema R60.9    Arthritis M19.90    Grade I diastolic dysfunction, EF 84%.  (Cardiac echo 3-) I51.9    Thrombocytopenia (HCC) D69.6    Abnormal findings on EGD (4-: Yakov Monteiro M.D.) - Gastritis w [id number] antral ulcers, LA class B esophagitis, HH R19.8    Abnormal colonoscopy (4-: Yakov Monteiro M.D.) - Sigmoid colitis, probably ischemic R93.3    Hypokalemia E87.6    Pelvic hematoma in male N50.1    Acute renal insufficiency N28.9    Hyperkalemia E87.5    Hyponatremia E87.1    Postoperative or surgical complication, initial encounter T81. 9XXA    Mild protein-calorie malnutrition (HCC) E44.1    Postprocedural hematoma of abdominal wall M96.89       Past Medical History:   Diagnosis Date    Abnormal brain MRI 03/26/2020    Episode of syncope 03/25/2020    GI bleed     FOBT negative 3/29/20    Hypertension         Past Surgical History:   Procedure Laterality Date    BACK SURGERY      CARPAL TUNNEL RELEASE Bilateral     COLONOSCOPY N/A 4/26/2020    COLONOSCOPY DIAGNOSTIC performed by Zoe Hammans, MD at [address] [place] [address]      IR EMBOLIZATION HEMORRHAGE  8/9/2020    IR EMBOLIZATION HEMORRHAGE 8/9/2020 Woody Núñez MD [facility]    JOINT REPLACEMENT Bilateral     hips    UPPER GASTROINTESTINAL ENDOSCOPY N/A 4/26/2020    EGD ESOPHAGOGASTRODUODENOSCOPY performed by Zoe Hammans, MD at [facility] OR       Current Medications:    lactated ringers 100 mL/hr at 08/09/20 1636      sodium chloride flush, HYDROcodone-acetaminophen, ondansetron, docusate sodium, acetaminophen **OR** acetaminophen, magnesium hydroxide, ondansetron **OR** [DISCONTINUED] ondansetron, sodium chloride flush    allopurinol  300 mg Oral Daily    doxazosin  2 mg Oral Nightly    lactulose  10 g Oral Daily    latanoprost  1 drop Right Eye Nightly    Vitamin D  3,000 Units Oral Daily    ferrous sulfate  325 mg Oral Daily with breakfast    sodium chloride flush  10 mL Intravenous 2 times per day    polyethylene glycol  17 g Oral Daily    sennosides-docusate sodium  1 tablet Oral BID    sodium chloride  20 mL Intravenous Once    furosemide  20 mg Oral Daily    pantoprazole  40 mg Oral QAM AC    potassium chloride  10 mEq Oral BID WC    spironolactone  37.5 mg Oral Daily        Allergies:  Patient has no known allergies.     Social History     Socioeconomic History    Marital status:      Spouse name: Not on file    Number of children: Not on file    Years of education: Not on file    Highest education level: Not on file   Occupational History    Not on file   Social Needs    Financial resource strain: Not on file    Food insecurity     Worry: Not on file     Inability: Not on file    Transportation needs     Medical: Not on file     Non-medical: Not on file   Tobacco Use    Smoking status: Former Smoker     Types: Cigarettes     Last attempt to quit: 1980     Years since quittin.6    Smokeless tobacco: Former User   Substance and Sexual Activity    Alcohol use:  Yes     Alcohol/week: 2.0 standard drinks     Types: 2 Shots of liquor per week     Comment: occasional    Drug use: Never    Sexual activity: Not on file   Lifestyle    Physical activity     Days per week: Not on file     Minutes per session: Not on file    Stress: Not on file   Relationships    Social connections     Talks on phone: Not on file     Gets together: Not on file     Attends [employer]ism service: Not on file     Active member of club or organization: Not on file     Attends meetings of clubs or organizations: Not on file     Relationship status: Not on file    Intimate partner violence     Fear of current or ex partner: Not on file     Emotionally abused: Not on file     Physically abused: Not on file     Forced sexual activity: Not on file   Other Topics Concern    Not on file   Social History Narrative    Not on file        Family History   Problem Relation Age of Onset    Cancer Father     Heart Attack Brother        REVIEW OF SYSTEMS:      Eyes:      Blurred vision:  No [x]/Yes []               Diplopia:   No [x]/Yes []               Vision loss:       No [x]/Yes []   Ears, nose, throat:             Hearing loss:    No [x]/Yes []      Vertigo:   No [x]/Yes []                       Swallowing problem:  No [x]/Yes []               Nose bleeds:   No [x]/Yes []      Voice hoarseness:  No [x]/Yes []  Respiratory:             Cough:   No [x]/Yes []      Pleuritic chest pain:  No [x]/Yes []                        Dyspnea:   No erythema    PULSE EXAM      Right      Left   Brachial     Radial     Femoral     Popliteal     Dorsalis Pedis 3 3   Posterior Tibial     (3=normal, 2=diminished, 1=barely palpable, 4=widened)      LABS:    Lab Results   Component Value Date    WBC 16.4 (H) 08/09/2020    HGB 7.7 (L) 08/09/2020    HCT 23.3 (L) 08/09/2020     (L) 08/09/2020    PROTIME 13.7 (H) 08/09/2020    INR 1.2 08/09/2020    K 4.9 08/09/2020    BUN 30 (H) 08/09/2020    CREATININE 1.3 (H) 08/09/2020       RADIOLOGY:    CTA reviewed.

## 2020-08-09 NOTE — PROGRESS NOTES
Spoke with Dr. Arvel Spurling regarding Hgb 6.3, plan is to transfer patient to [facility] in [facility].   Luis Foy RN

## 2020-08-09 NOTE — PROGRESS NOTES
Comprehensive Nutrition Assessment    Type and Reason for Visit:  Initial, Positive Nutrition Screen    Nutrition Recommendations/Plan: Continue NPO. Will provide nutrition recommendations w/ nutrition advancement as medically appropriate. Nutrition Assessment:  Pt w/ nutritional risk d/t admit s/p hernia repair now w/ pelvic hematoma. Now meets mild malnutrition criteria. Will monitor/follow & provide reccomendations as medically appropriate.     Malnutrition Assessment:  Malnutrition Status:  Mild malnutrition    Context:  Chronic Illness     Findings of the 6 clinical characteristics of malnutrition:  Energy Intake:  Mild decrease in energy intake (Comment)(noted poor po intake x 5 days PTA)  Weight Loss:  1 - Mild weight loss (specify amount and time period)(8% wt loss x 5 months)     Body Fat Loss:  Unable to assess(d/t lack of PPE)     Muscle Mass Loss:  Unable to assess(d/t lack of PPE)    Fluid Accumulation:  No significant fluid accumulation     Strength:  Not Performed    Estimated Daily Nutrient Needs:  Energy (kcal):  3106-4127; Weight Used for Energy Requirements:  Current     Protein (g):  1.3-1.5= 80-90; Weight Used for Protein Requirements:  Current        Fluid (ml/day):  1400ml; Weight Used for Fluid Requirements:  Current      Nutrition Related Findings:  +i/os, distended abd, abd pain noted, no edema      Wounds:  None       Current Nutrition Therapies:    Diet NPO Effective Now    Anthropometric Measures:  · Height: 5' 4\" (162.6 cm)  · Current Body Weight: 138 lb (62.6 kg)   · Usual Body Weight: 150 lb (68 kg)(3/2020 actual per EMR)     · Ideal Body Weight: 130 lbs; % Ideal Body Weight 106.2 %   · BMI: 23.7  · Adjusted Body Weight:  ; No Adjustment   · BMI Categories: Normal Weight (BMI 22.0 to 24.9) age over 72       Nutrition Diagnosis:   · Mild malnutrition, In context of chronic illness related to altered GI structure(hx of gastritis & s/p hernia repair) as evidenced by weight loss 7.5% in 3 months, poor intake prior to admission      Nutrition Interventions:   Food and/or Nutrient Delivery:  Continue NPO  Nutrition Education/Counseling:  No recommendation at this time   Coordination of Nutrition Care:  Continued Inpatient Monitoring    Goals:  nutrititon as medically feasible       Nutrition Monitoring and Evaluation:   Food/Nutrient Intake Outcomes:  Diet Advancement/Tolerance  Physical Signs/Symptoms Outcomes:  Biochemical Data, GI Status, Fluid Status or Edema, Hemodynamic Status, Nutrition Focused Physical Findings, Skin, Weight     Discharge Planning:     Too soon to determine     Electronically signed by Tish Eisenmenger, RD, LD on 8/9/20 at 9:55 AM EDT    Contact: x [phone]

## 2020-08-09 NOTE — H&P
GENERAL SURGERY  HISTORY AND PHYSICAL  8/9/2020  CC:  Left inguinal pain,     HPI  Argentina Woodson is a 80 y.o. male who presents for evaluation of weakness, fatigue and syncopal episodes at home. He had robotic assisted laparoscopic left inguinal hernia repair on 8/5 @ [facility]. He had repair of quite large symptomatic hernia. Symptoms started at home after surgery. He presented to the ED @ [facility] with soft blood pressures and Hgb 6.8 (baseline 8-9) with a CT scan showing 13cm pelvic hematoma. He was given 2u RBC and is now normotensive. Comlpains of mild discomfort suprapubic region and groin. Denies any nausea, vomiting, chest pain, SOB. His hemoglobin has continued to trend down, @ 8/8 2000 was 6.3 and continued to have orthostatic hypotension. He was transferred from [facility] to [facility] for admission to SICU for further care. Presently denies dizziness, SOB, CP. C/o 5/10 left groin pain. Past Medical History:   Diagnosis Date    Abnormal brain MRI 03/26/2020    Episode of syncope 03/25/2020    GI bleed     FOBT negative 3/29/20    Hypertension        Past Surgical History:   Procedure Laterality Date    BACK SURGERY      CARPAL TUNNEL RELEASE Bilateral     COLONOSCOPY N/A 4/26/2020    COLONOSCOPY DIAGNOSTIC performed by Hany Weathers MD at [address] JOINT REPLACEMENT Bilateral     hips    UPPER GASTROINTESTINAL ENDOSCOPY N/A 4/26/2020    EGD ESOPHAGOGASTRODUODENOSCOPY performed by Hany Weathers MD at [address]       Prior to Admission medications    Medication Sig Start Date End Date Taking?  Authorizing Provider   allopurinol (ZYLOPRIM) 300 MG tablet Take 300 mg by mouth daily    Historical Provider, MD   spironolactone (ALDACTONE) 25 MG tablet Take 37.5 mg by mouth daily    Historical Provider, MD   LACTULOSE PO Take by mouth as needed    Historical Provider, MD   Multiple Vitamins-Minerals (PRESERVISION AREDS 2+MULTI VIT PO) Take by mouth daily Historical Provider, MD   VITAMIN D PO Take 3,000 Units by mouth daily    Historical Provider, MD   Glucosamine-Chondroitin (GLUCOSAMINE CHONDR COMPLEX PO) Take by mouth daily    Historical Provider, MD   Saw Josephine, Serenoa repens, (SAW PALMETTO PO) Take by mouth daily    Historical Provider, MD   furosemide (LASIX) 20 MG tablet Take 1 tablet by mouth daily 20   Han Lewis MD   pantoprazole (PROTONIX) 40 MG tablet Take 1 tablet by mouth every morning (before breakfast) 20   Julius Johnson MD   doxazosin (CARDURA) 2 MG tablet Take 1 tablet by mouth nightly 20   Julius Johnson MD   potassium chloride (KLOR-CON M) 20 MEQ extended release tablet Take 1 tablet by mouth 2 times daily (with meals)  Patient taking differently: Take 10 mEq by mouth 2 times daily (with meals)  20   Julius Johnson MD   simvastatin (ZOCOR) 20 MG tablet Take 1 tablet by mouth nightly  Patient not taking: Reported on 2020 3/31/20   Sean Millard MD   ferrous sulfate (IRON 325) 325 (65 Fe) MG tablet Take 1 tablet by mouth 2 times daily (with meals)  Patient taking differently: Take 325 mg by mouth as needed  3/31/20   Sean Millard MD   latanoprost (XALATAN) 0.005 % ophthalmic solution Place 1 drop into the right eye nightly    Historical Provider, MD   docusate sodium (COLACE) 100 MG capsule Take 100 mg by mouth 2 times daily as needed for Constipation    Historical Provider, MD   lisinopril (PRINIVIL;ZESTRIL) 10 MG tablet Take 10 mg by mouth daily    Historical Provider, MD       No Known Allergies    Family History   Problem Relation Age of Onset    Cancer Father     Heart Attack Brother        Social History     Tobacco Use    Smoking status: Former Smoker     Types: Cigarettes     Last attempt to quit: 1980     Years since quittin.6    Smokeless tobacco: Former User   Substance Use Topics    Alcohol use:  Yes     Alcohol/week: 2.0 standard drinks Types: 2 Shots of liquor per week     Comment: occasional    Drug use: Never         Review of Systems: pertinent ROS listed in HPI, all others negative       PHYSICAL EXAM:    Vitals:    08/09/20 0100   BP: (!) 81/50   Pulse: 121   Resp: 13   Temp:    SpO2: 95%       GENERAL:  NAD. A&Ox3. HEAD:  Normocephalic, atraumatic. EYES:   No scleral icterus. PERRLA. LUNGS:  No increased work of breathing. CARDIOVASCULAR: mild tachycardic, hypotension. ABDOMEN:  Soft, non-distended, TTP LLQ. No guarding, rigidity, rebound. Incisions are c/d/i with dermabond        ASSESSMENT/PLAN:  80 y.o. male with pelvic hematoma and acute on chronic anemia s/p 8/5 robotic laparoscopic left inguinal hernia repair    -Admit to ICU  -Consult to ICU team for critical care management  -q6 H/H, transfuse prn for Hgb <7 or asymptomatic  -CTA abd/pel stat to reassess pelvic hematoma and possible source  -Pending CTA results may need IR embolization  -Prn pain control  -Prn antiemetics  -PPX:  SCDs, IS. Hold chemoppx due to concern for acute bleeding      Plan will be discussed with Dr. Ora Cheek.     Marla Tate DO  Resident, PGY-2  8/9/2020  1:52 AM

## 2020-08-09 NOTE — BRIEF OP NOTE
Brief Postoperative Note    Fiordaliza Esquivel  YOB: 1931  [id number]    Pre-operative Diagnosis and Procedure: 79 yo M s/p hernia repair with pelvic hematoma on CTA. Here for pelvic angiogram and possible embolization. Post-operative Diagnosis: Same    Anesthesia: Local    Estimated Blood Loss: < 10 cc    Surgeon: Cristofer Thao MD    Complications: none    Specimen obtained: none     Findings:     Successful embolization of the left superficial epigastric artery with gel foam and coils.      Cristofer Thao MD   8/9/2020 10:54 AM

## 2020-08-09 NOTE — CONSULTS
SURGICAL CRITICAL CARE  ICU CONSULT NOTE      Date of admission:  8/9/2020  Reason for ICU transfer:  Pelvic hematoma, hypotension, acute blood loss anemia    LUAN Orourke is a 80 y.o. male who presents for evaluation of weakness, fatigue and syncopal episodes at home. He had robotic assisted laparoscopic left inguinal hernia repair on 8/5 @ [facility]. He had repair of quite large symptomatic hernia.       Symptoms started at home after surgery. He presented to the ED @ [facility] with soft blood pressures and Hgb 6.8 (baseline 8-9) with a CT scan showing 13cm pelvic hematoma. He was given 2u RBC and is now normotensive. Comlpains of mild discomfort suprapubic region and groin. Denies any nausea, vomiting, chest pain, SOB. His hemoglobin has continued to trend down, @ 8/8 2000 was 6.3 and continued to have orthostatic hypotension. He was transferred from [facility] to [facility] for admission to SICU for further care.     Presently denies dizziness, SOB, CP. C/o 5/10 left groin pain. PHYSICAL EXAM  Vitals:    08/09/20 0213   BP: (!) 97/42   Pulse: 109   Resp: 26   Temp: 100.2 °F (37.9 °C)   SpO2: 96%       GENERAL:  NAD. A&Ox3. HEAD:  Normocephalic. Atraumatic. EYES:   No scleral icterus. PERRL. LUNGS:  No increased work of breathing. CARDIOVASCULAR: mild tachycardia, hypotension   ABDOMEN:  Soft, non-distended, TTP LLQ. No guarding, rigidity, rebound. Incisions are c/d/i with dermabondEXTREMITIES:   MAEx4. Atraumatic. No LE edema. SKIN:  Warm and dry  NEUROLOGIC:  GCS 15      ASSESSMENT/PLAN  Neuro:  Pain - tylenol, oxy  CV:  Anemia, hypotension 2/2 hypovolemia - Monitor hemodynamics. Transfuse prn  Pulm:  No acute issues - Monitor RR, SpO2. IS, pulmonary toilet  GI:  NPO. Start H2 blocker for DVT ppx. Bowel regimen  Renal:  VALERIE- IVFs. Monitor UOP, Electrolytes, replace prn. Heme:        Acute blood loss anemia on chronic anemia. q6 H/H. Transfuse prn for Hgb <7 or symptomatic  ID:  No acute issues. Monitor for SIRS. Endo:  No acute issues. Monitor glucose, SSI.   MSK:  S/p right inguinal hernia repair. PT/OT when able. Bowel Regimen: Senna/Colace  DVT PPx:  PCDs, hold chemoppx in setting of bleeding and persistent anemia  GI PPx:  H2 blocker   Glucose Protocol: n/a  Mouth/Eye Care: Peridex/Aqua tears   Saleh:   Keep in place for critical care monitoring of fluid balance. CVC Sites:  [facility] 8/9  Ancillary Consults: General Surgery admitting, Critical Care consult  Family Update: As available         HOSPITAL COURSE:  8/9/20  - admitted to ICU, transferred from [facility] for ICU level of care for pelvic hematoma and acute blood loss anemia.           Wendy Childers DO  Resident, PGY-2  8/9/2020  2:17 AM

## 2020-08-09 NOTE — PRE SEDATION
Sedation Pre-Procedure Note    Patient Name: Minor Malone   YOB: 1931  Room/Bed: [address] [place], [place] [address]  Medical Record Number: [id number]  Date: 8/9/2020   Time: 10:54 AM       Indication:  81 yo M s/p hernia repair with pelvic hematoma on CTA. Here for pelvic angiogram and possible embolization. Consent: I have discussed with the patient and/or the patient representative the indication, alternatives, and the possible risks and/or complications of the planned procedure and the anesthesia methods. The patient and/or patient representative appear to understand and agree to proceed. Vital Signs:   Vitals:    08/09/20 1015   BP: 128/62   Pulse: 83   Resp: 23   Temp: 99.7 °F (37.6 °C)   SpO2: 96%       Past Medical History:   has a past medical history of Abnormal brain MRI, Episode of syncope, GI bleed, and Hypertension. Past Surgical History:   has a past surgical history that includes hernia repair; joint replacement (Bilateral); Carpal tunnel release (Bilateral); eye surgery; back surgery; Upper gastrointestinal endoscopy (N/A, 4/26/2020); and Colonoscopy (N/A, 4/26/2020).     Medications:   Scheduled Meds:    allopurinol  300 mg Oral Daily    doxazosin  2 mg Oral Nightly    lactulose  10 g Oral Daily    latanoprost  1 drop Right Eye Nightly    Vitamin D  3,000 Units Oral Daily    ferrous sulfate  325 mg Oral Daily with breakfast    sodium chloride flush  10 mL Intravenous 2 times per day    polyethylene glycol  17 g Oral Daily    sennosides-docusate sodium  1 tablet Oral BID    sodium chloride  20 mL Intravenous Once    furosemide  20 mg Oral Daily    pantoprazole  40 mg Oral QAM AC    potassium chloride  10 mEq Oral BID WC    spironolactone  37.5 mg Oral Daily     Continuous Infusions:   PRN Meds: sodium chloride flush, HYDROcodone-acetaminophen, ondansetron, docusate sodium, acetaminophen **OR** acetaminophen, magnesium hydroxide, ondansetron **OR** [DISCONTINUED] ondansetron, sodium chloride flush  Home Meds:   Prior to Admission medications    Medication Sig Start Date End Date Taking?  Authorizing Provider   allopurinol (ZYLOPRIM) 300 MG tablet Take 300 mg by mouth daily    Historical Provider, MD   spironolactone (ALDACTONE) 25 MG tablet Take 37.5 mg by mouth daily    Historical Provider, MD   LACTULOSE PO Take by mouth as needed    Historical Provider, MD   Multiple Vitamins-Minerals (PRESERVISION AREDS 2+MULTI VIT PO) Take by mouth daily    Historical Provider, MD   VITAMIN D PO Take 3,000 Units by mouth daily    Historical Provider, MD   Glucosamine-Chondroitin (GLUCOSAMINE CHONDR COMPLEX PO) Take by mouth daily    Historical Provider, MD   [place], Serenoa repens, (SAW PALMETTO PO) Take by mouth daily    Historical Provider, MD   furosemide (LASIX) 20 MG tablet Take 1 tablet by mouth daily 5/13/20   Noy Rodriguez MD   pantoprazole (PROTONIX) 40 MG tablet Take 1 tablet by mouth every morning (before breakfast) 4/29/20   Marie Risk., MD   doxazosin (CARDURA) 2 MG tablet Take 1 tablet by mouth nightly 4/28/20   Marie Risk., MD   potassium chloride (KLOR-CON M) 20 MEQ extended release tablet Take 1 tablet by mouth 2 times daily (with meals)  Patient taking differently: Take 10 mEq by mouth 2 times daily (with meals)  4/28/20   Marie Risk., MD   simvastatin (ZOCOR) 20 MG tablet Take 1 tablet by mouth nightly  Patient not taking: Reported on 5/13/2020 3/31/20   Se Conklin MD   ferrous sulfate (IRON 325) 325 (65 Fe) MG tablet Take 1 tablet by mouth 2 times daily (with meals)  Patient taking differently: Take 325 mg by mouth as needed  3/31/20   Marly Cervantes MD   latanoprost (XALATAN) 0.005 % ophthalmic solution Place 1 drop into the right eye nightly    Historical Provider, MD   docusate sodium (COLACE) 100 MG capsule Take 100 mg by mouth 2 times daily as needed for Constipation    Historical Provider, MD   lisinopril (PRINIVIL;ZESTRIL) 10 MG tablet Take 10 mg by mouth daily    Historical Provider, MD     Coumadin Use Last 7 Days:  no  Antiplatelet drug therapy use last 7 days: no  Other anticoagulant use last 7 days: no  Additional Medication Information:  n/a      Pre-Sedation Documentation and Exam:   I have personally completed a history, physical exam & review of systems for this patient (see notes).     Mallampati Airway Assessment:  Mallampati Class II - (soft palate, fauces & uvula are visible)    Prior History of Anesthesia Complications:   none    ASA Classification:  Class 3 - A patient with severe systemic disease that limits activity but is not incapacitating    Sedation/ Anesthesia Plan:   intravenous sedation    Medications Planned:   midazolam (Versed) intravenously and fentanyl intravenously    Patient is an appropriate candidate for plan of sedation: yes    Electronically signed by Woody Núñez MD on 8/9/2020 at 10:54 AM

## 2020-08-09 NOTE — PROGRESS NOTES
Patient's code status listed as DNR-CCA. Discussed with patient to confirm his code status. He reports he does wish to be DNR-CCA. He states he does not want to be intubated or have CPR. He does desire treatment for medical conditions and for example would want to receive antibiotics for an infection. He thinks he has Living will and POA paperwork at home. He will as his wife to bring in for documentation purposes.     Electronically signed by Tony Viramontes DO on 8/9/2020 at 3:17 AM

## 2020-08-09 NOTE — PROCEDURES
Regi Grace is a 80 y.o. male patient. No diagnosis found. Past Medical History:   Diagnosis Date    Abnormal brain MRI 03/26/2020    Episode of syncope 03/25/2020    GI bleed     FOBT negative 3/29/20    Hypertension      Blood pressure (!) 81/50, pulse 121, temperature 100.9 °F (38.3 °C), temperature source Bladder, resp. rate 13, height 5' 4\" (1.626 m), weight 138 lb 4.8 oz (62.7 kg), SpO2 95 %. Central Line    Date/Time: 8/9/2020 1:50 AM  Performed by: Hector Riley DO  Authorized by: Hector Riley DO   Consent: The procedure was performed in an emergent situation. Verbal consent obtained. Risks and benefits: risks, benefits and alternatives were discussed  Consent given by: patient  Required items: required blood products, implants, devices, and special equipment available  Patient identity confirmed: arm band and verbally with patient  Time out: Immediately prior to procedure a \"time out\" was called to verify the correct patient, procedure, equipment, support staff and site/side marked as required.   Indications: vascular access  Anesthesia: local infiltration    Anesthesia:  Local Anesthetic: lidocaine 1% without epinephrine    Sedation:  Patient sedated: no    Preparation: skin prepped with 2% chlorhexidine  Skin prep agent dried: skin prep agent completely dried prior to procedure  Sterile barriers: all five maximum sterile barriers used - cap, mask, sterile gown, sterile gloves, and large sterile sheet  Hand hygiene: hand hygiene performed prior to central venous catheter insertion  Location details: right internal jugular  Patient position: Trendelenburg  Catheter type: triple lumen  Catheter size: 7 Fr  Pre-procedure: landmarks identified  Ultrasound guidance: yes  Sterile ultrasound techniques: sterile gel and sterile probe covers were used  Number of attempts: 1  Successful placement: yes  Post-procedure: line sutured and dressing applied  Assessment: blood return through all ports,  free fluid flow,  placement verified by x-ray and no pneumothorax on x-ray  Patient tolerance: Patient tolerated the procedure well with no immediate complications          Vinh Saldivar DO  8/9/2020

## 2020-08-09 NOTE — INTERVAL H&P NOTE
Update History & Physical    The patient's History and Physical of August 9, 2020 was reviewed with the patient and I examined the patient. There was no change. The surgical site was confirmed by the patient and me. Plan: The risks, benefits, expected outcome, and alternative to the recommended procedure have been discussed with the patient. Patient understands and wants to proceed with the procedure.      Electronically signed by Olivia Bonds MD on 8/9/2020 at 10:44 AM

## 2020-08-09 NOTE — SEDATION DOCUMENTATION
Bedside report given to [facility]alle[place]. Dr Kirsten Murguia wants stat H&H. Wife placed in SICU waiting room.

## 2020-08-09 NOTE — CONSULTS
Blood and Marroquin Fix  Dr. Caryle Razor      Patient Name: Ramana Meadows  YOB: 1931  PCP: Fonnie Najjar, MD   Referring Provider: [address] [place] [address] 200 / [place] [id number]     Reason for Consultation: No chief complaint on file. History of Present Illness: This pt is a very pleasant 81 yo male, well known to me from previous admissions and outpatient follow ups, seen for recurrent anemia of uncertain etiology, suspected GI bleeding due to large amounts of required pRBC transfusions. 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 He is now admitted with progressive weakness, fatigue and a syncopal episode at home and was found to have Hgb 6.8 in the ER. He recently underwent robotic inguinal hernia repair at [facility] on 8/5. He has undergone CT A/P on 8/6 which showed a 13 cm L pelvic hematoma and hematoma and gas in the L inguinal canal as well as a large amount of intraperitoneal free air (likely due to post-op). CTA A/P on 8/9 showed not active arterial bleeding with increased L groin hematoma. He has received 3 unit spRBCs so far, with 4th this AM. He is being evaluated by IR for pelvic angio and possible embolization.  Hgb at 01:00 this morning was 5.5, repeat s/p transfusion up to 6.9    Diagnostic Data:     Past Medical History:   Diagnosis Date    Abnormal brain MRI 03/26/2020    Episode of syncope 03/25/2020    GI bleed     FOBT negative 3/29/20    Hypertension        Patient Active Problem List    Diagnosis Date Noted    Mild protein-calorie malnutrition ([address] 08/09/2020    Postoperative or surgical complication, initial encounter 08/08/2020    Acute renal insufficiency 08/07/2020    Hyperkalemia 08/07/2020    Hyponatremia 08/07/2020    Pelvic hematoma in male 08/06/2020    Thrombocytopenia ([address] 04/27/2020    Abnormal findings on EGD (4-: Zoe Hammans, M.D.) - Gastritis w [id number] antral ulcers, LA class B esophagitis, HH 04/27/2020    Abnormal colonoscopy (4-: Lilian Rose M.D.) - Sigmoid colitis, probably ischemic 04/27/2020    Hypokalemia 04/27/2020    Peripheral edema 04/24/2020    Arthritis 04/24/2020    Grade I diastolic dysfunction, EF 78%. (Cardiac echo 3-) 04/24/2020    Gross hematuria 03/30/2020    Acute ischemic stroke (HCC)     Acute blood loss anemia     Syncope and collapse     Aspiration pneumonia (HCC)     Pneumonia due to organism 03/20/2020    Anemia 03/20/2020    HTN (hypertension) 03/20/2020    VALERIE (acute kidney injury) ([facility] 03/20/2020    Acute hyperglycemia 03/20/2020    Community acquired pneumonia         Past Surgical History:   Procedure Laterality Date    BACK SURGERY      CARPAL TUNNEL RELEASE Bilateral     COLONOSCOPY N/A 4/26/2020    COLONOSCOPY DIAGNOSTIC performed by Lilian Rose MD at [address] [place] [address]      JOINT REPLACEMENT Bilateral     hips    UPPER GASTROINTESTINAL ENDOSCOPY N/A 4/26/2020    EGD ESOPHAGOGASTRODUODENOSCOPY performed by Lilian Rose MD at [facility] OR       Family History  Family History   Problem Relation Age of Onset    Cancer Father     Heart Attack Brother        Social History    TOBACCO:   reports that he quit smoking about 40 years ago. His smoking use included cigarettes. He has quit using smokeless tobacco.  ETOH:   reports current alcohol use of about 2.0 standard drinks of alcohol per week. Home Medications  Prior to Admission medications    Medication Sig Start Date End Date Taking?  Authorizing Provider   allopurinol (ZYLOPRIM) 300 MG tablet Take 300 mg by mouth daily    Historical Provider, MD   spironolactone (ALDACTONE) 25 MG tablet Take 37.5 mg by mouth daily    Historical Provider, MD   LACTULOSE PO Take by mouth as needed    Historical Provider, MD   Multiple Vitamins-Minerals (PRESERVISION AREDS 2+MULTI VIT PO) Take by mouth daily    Historical Sclera non icteric. Oropharynx : Clear  Neck: no JVD,  no adenopathy  LYMPHATICS : No LAD  Heart: Tahcycardia, regular rhythm, no murmur  Lungs: Clear to auscultation   Extremities: No edema,no cyanosis, no clubbing. Abdomen: Soft, non-tender;no masses, no organomegaly  Skin:  No rash  Neurologic:Cranial nerves grossly intact. No focal motor or sensory deficits . Recent Laboratory Data-   Lab Results   Component Value Date    WBC 16.4 (H) 08/09/2020    HGB 6.9 (L) 08/09/2020    HCT 21.3 (L) 08/09/2020    MCV 93.8 08/09/2020     (L) 08/09/2020    LYMPHOPCT 7.0 (L) 08/09/2020    RBC 2.27 (L) 08/09/2020    MCH 30.4 08/09/2020    MCHC 32.4 08/09/2020    RDW 15.4 (H) 08/09/2020    NEUTOPHILPCT 71.3 08/09/2020    MONOPCT 21.7 (H) 08/09/2020    BASOPCT 0.1 08/09/2020    NEUTROABS 14.13 (H) 08/09/2020    LYMPHSABS 1.39 (L) 08/09/2020    MONOSABS 4.38 (H) 08/09/2020    EOSABS 0.00 (L) 08/09/2020    BASOSABS 0.00 08/09/2020       Lab Results   Component Value Date     08/09/2020    K 4.9 08/09/2020     08/09/2020    CO2 20 (L) 08/09/2020    BUN 30 (H) 08/09/2020    CREATININE 1.3 (H) 08/09/2020    GLUCOSE 119 (H) 08/09/2020    CALCIUM 8.9 08/09/2020    PROT 5.8 (L) 08/09/2020    LABALBU 2.7 (L) 08/09/2020    BILITOT 0.4 08/09/2020    ALKPHOS 43 08/09/2020    AST 9 08/09/2020    ALT <5 08/09/2020    LABGLOM 52 08/09/2020    GFRAA >60 08/09/2020       Lab Results   Component Value Date    IRON 52 (L) 04/24/2020    TIBC 170 (L) 04/24/2020    FERRITIN 1,049 04/24/2020           Radiology-    CTA ABDOMEN PELVIS W CONTRAST   Final Result   No active arterial bleeding is identified. Artifact from the hip prostheses    obscures the adjacent soft tissues. A subtle area of active bleeding could be    obscured by the artifact. Left groin hematoma has increased in size prior CT. Findings could represent    venous bleeding. If indicated clinically recommend correlation with catheter    angiography. Bibasal atelectasis and pleural effusions are slightly worse. Cannot exclude a    superimposed pneumonia. This report has been electronically signed by Kanu Finn MD.      XR CHEST PORTABLE   Final Result      Right internal jugular central venous catheter tip terminates over the   superior cavoatrial junction. No evidence of pneumothorax. Pneumoperitoneum better visualized on prior CT. 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 Pelvic hematoma expanding with dropping H+H consistent with continued bleeding into the soft tissue  - IR evaluating for potential embolization  - Continue to trend H+H and transfusion for Hgb <7 or if symptomatic  - He has developed a mild thrombocytopenia, this is due to consumption. Monitor  - With further transfusions, follow coagulation profile to monitor clotting ability and transfuse FFP as needed.  PT 13.7 and INR 1.2 this AM, not requiring replacement at this time  - In depth anemia work up not warranted at this time as previously completed and patient has known active source of blood loss  - Will follow with you      Electronically signed by Ness Anderson MD on 8/9/2020 at 11:36 AM

## 2020-08-09 NOTE — PROGRESS NOTES
Name: Pascual Amaya  : 3/10/1931  MRN: [id number]    Date: 2020    Benefits of immediately proceeding with Radiology exam outweigh the risks and therefore the following is being waived:      [] Pregnancy test    [] Protocol for Iodine allergy    [] MRI questionnaire    [x] BUN/Creatinine        Jennifer Mcdonnell DO      Electronically signed by Jennifer Mcdonnell DO on 2020 at 2:25 AM

## 2020-08-09 NOTE — PROGRESS NOTES
Unfortunately, the patient had a drop of his hemoglobin to 6.3. Again, the patient had robotic assisted laparoscopic repair of a left inguinal hernia. The hernia was relatively large in size. The patient had a clean operative site after completion. Since Wednesday evening, the patient has had issues related to a hematoma. This morning, the patient's hemoglobin went from 7.1-7.7. The evening hemoglobin was 6.3. The patient continues to have orthostatic hypotension. The patient will be transferred to the main campus to the surgical intensive care unit for a CTA to establish the source of bleeding with possible embolization. I spoke with the patient and his wife and we are in agreement.

## 2020-08-10 LAB
ALBUMIN SERPL-MCNC: 2.6 G/DL (ref 3.5–5.2)
ALP BLD-CCNC: 46 U/L (ref 40–129)
ALT SERPL-CCNC: <5 U/L (ref 0–40)
ANION GAP SERPL CALCULATED.3IONS-SCNC: 14 MMOL/L (ref 7–16)
ANISOCYTOSIS: ABNORMAL
AST SERPL-CCNC: 13 U/L (ref 0–39)
BASOPHILS ABSOLUTE: 0 E9/L (ref 0–0.2)
BASOPHILS RELATIVE PERCENT: 0.1 % (ref 0–2)
BILIRUB SERPL-MCNC: 0.7 MG/DL (ref 0–1.2)
BLASTS RELATIVE PERCENT: 0.9 % (ref 0–0)
BLOOD BANK DISPENSE STATUS: NORMAL
BLOOD BANK DISPENSE STATUS: NORMAL
BLOOD BANK PRODUCT CODE: NORMAL
BLOOD BANK PRODUCT CODE: NORMAL
BPU ID: NORMAL
BPU ID: NORMAL
BUN BLDV-MCNC: 24 MG/DL (ref 8–23)
CALCIUM IONIZED: 1.25 MMOL/L (ref 1.15–1.33)
CALCIUM SERPL-MCNC: 9.1 MG/DL (ref 8.6–10.2)
CHLORIDE BLD-SCNC: 101 MMOL/L (ref 98–107)
CO2: 20 MMOL/L (ref 22–29)
CREAT SERPL-MCNC: 1 MG/DL (ref 0.7–1.2)
DESCRIPTION BLOOD BANK: NORMAL
DESCRIPTION BLOOD BANK: NORMAL
EOSINOPHILS ABSOLUTE: 0.09 E9/L (ref 0.05–0.5)
EOSINOPHILS RELATIVE PERCENT: 0.9 % (ref 0–6)
GFR AFRICAN AMERICAN: >60
GFR NON-AFRICAN AMERICAN: >60 ML/MIN/1.73
GLUCOSE BLD-MCNC: 92 MG/DL (ref 74–99)
HCT VFR BLD CALC: 20.7 % (ref 37–54)
HCT VFR BLD CALC: 22.7 % (ref 37–54)
HEMOGLOBIN: 6.5 G/DL (ref 12.5–16.5)
HEMOGLOBIN: 7.2 G/DL (ref 12.5–16.5)
INR BLD: 1.1
LYMPHOCYTES ABSOLUTE: 0.71 E9/L (ref 1.5–4)
LYMPHOCYTES RELATIVE PERCENT: 7 % (ref 20–42)
MAGNESIUM: 2 MG/DL (ref 1.6–2.6)
MCH RBC QN AUTO: 30 PG (ref 26–35)
MCH RBC QN AUTO: 30.1 PG (ref 26–35)
MCHC RBC AUTO-ENTMCNC: 31.4 % (ref 32–34.5)
MCHC RBC AUTO-ENTMCNC: 31.7 % (ref 32–34.5)
MCV RBC AUTO: 95 FL (ref 80–99.9)
MCV RBC AUTO: 95.4 FL (ref 80–99.9)
MONOCYTES ABSOLUTE: 1.84 E9/L (ref 0.1–0.95)
MONOCYTES RELATIVE PERCENT: 18.4 % (ref 2–12)
MRSA CULTURE ONLY: NORMAL
NEUTROPHILS ABSOLUTE: 7.45 E9/L (ref 1.8–7.3)
NEUTROPHILS RELATIVE PERCENT: 72.8 % (ref 43–80)
OVALOCYTES: ABNORMAL
PDW BLD-RTO: 15.4 FL (ref 11.5–15)
PDW BLD-RTO: 15.4 FL (ref 11.5–15)
PHOSPHORUS: 3.7 MG/DL (ref 2.5–4.5)
PLATELET # BLD: 105 E9/L (ref 130–450)
PLATELET # BLD: 111 E9/L (ref 130–450)
PMV BLD AUTO: 11.3 FL (ref 7–12)
PMV BLD AUTO: 12.1 FL (ref 7–12)
POIKILOCYTES: ABNORMAL
POLYCHROMASIA: ABNORMAL
POTASSIUM SERPL-SCNC: 4.6 MMOL/L (ref 3.5–5)
PROTHROMBIN TIME: 12.5 SEC (ref 9.3–12.4)
RBC # BLD: 2.17 E12/L (ref 3.8–5.8)
RBC # BLD: 2.39 E12/L (ref 3.8–5.8)
SCHISTOCYTES: ABNORMAL
SODIUM BLD-SCNC: 135 MMOL/L (ref 132–146)
TOTAL PROTEIN: 5.8 G/DL (ref 6.4–8.3)
WBC # BLD: 10.2 E9/L (ref 4.5–11.5)
WBC # BLD: 12.4 E9/L (ref 4.5–11.5)

## 2020-08-10 PROCEDURE — 85610 PROTHROMBIN TIME: CPT

## 2020-08-10 PROCEDURE — 97530 THERAPEUTIC ACTIVITIES: CPT

## 2020-08-10 PROCEDURE — 6370000000 HC RX 637 (ALT 250 FOR IP): Performed by: SURGERY

## 2020-08-10 PROCEDURE — 84100 ASSAY OF PHOSPHORUS: CPT

## 2020-08-10 PROCEDURE — 99291 CRITICAL CARE FIRST HOUR: CPT | Performed by: SURGERY

## 2020-08-10 PROCEDURE — 2580000003 HC RX 258: Performed by: SURGERY

## 2020-08-10 PROCEDURE — 85027 COMPLETE CBC AUTOMATED: CPT

## 2020-08-10 PROCEDURE — 6370000000 HC RX 637 (ALT 250 FOR IP): Performed by: STUDENT IN AN ORGANIZED HEALTH CARE EDUCATION/TRAINING PROGRAM

## 2020-08-10 PROCEDURE — 97535 SELF CARE MNGMENT TRAINING: CPT

## 2020-08-10 PROCEDURE — 85025 COMPLETE CBC W/AUTO DIFF WBC: CPT

## 2020-08-10 PROCEDURE — 2000000000 HC ICU R&B

## 2020-08-10 PROCEDURE — 2580000003 HC RX 258: Performed by: STUDENT IN AN ORGANIZED HEALTH CARE EDUCATION/TRAINING PROGRAM

## 2020-08-10 PROCEDURE — 97166 OT EVAL MOD COMPLEX 45 MIN: CPT

## 2020-08-10 PROCEDURE — 36415 COLL VENOUS BLD VENIPUNCTURE: CPT

## 2020-08-10 PROCEDURE — 82330 ASSAY OF CALCIUM: CPT

## 2020-08-10 PROCEDURE — 80053 COMPREHEN METABOLIC PANEL: CPT

## 2020-08-10 PROCEDURE — 83735 ASSAY OF MAGNESIUM: CPT

## 2020-08-10 PROCEDURE — 97162 PT EVAL MOD COMPLEX 30 MIN: CPT

## 2020-08-10 RX ORDER — HEPARIN SODIUM 10000 [USP'U]/ML
INJECTION, SOLUTION INTRAVENOUS; SUBCUTANEOUS
Status: COMPLETED | OUTPATIENT
Start: 2020-08-09 | End: 2020-08-09

## 2020-08-10 RX ORDER — 0.9 % SODIUM CHLORIDE 0.9 %
20 INTRAVENOUS SOLUTION INTRAVENOUS ONCE
Status: COMPLETED | OUTPATIENT
Start: 2020-08-10 | End: 2020-08-10

## 2020-08-10 RX ADMIN — DOXAZOSIN 2 MG: 2 TABLET ORAL at 21:07

## 2020-08-10 RX ADMIN — DOCUSATE SODIUM 50 MG AND SENNOSIDES 8.6 MG 1 TABLET: 8.6; 5 TABLET, FILM COATED ORAL at 21:07

## 2020-08-10 RX ADMIN — ATORVASTATIN CALCIUM 10 MG: 10 TABLET, FILM COATED ORAL at 08:47

## 2020-08-10 RX ADMIN — SODIUM CHLORIDE, POTASSIUM CHLORIDE, SODIUM LACTATE AND CALCIUM CHLORIDE: 600; 310; 30; 20 INJECTION, SOLUTION INTRAVENOUS at 00:45

## 2020-08-10 RX ADMIN — POTASSIUM CHLORIDE 10 MEQ: 10 TABLET, EXTENDED RELEASE ORAL at 08:46

## 2020-08-10 RX ADMIN — SODIUM CHLORIDE, PRESERVATIVE FREE 10 ML: 5 INJECTION INTRAVENOUS at 21:06

## 2020-08-10 RX ADMIN — DOCUSATE SODIUM 50 MG AND SENNOSIDES 8.6 MG 1 TABLET: 8.6; 5 TABLET, FILM COATED ORAL at 08:47

## 2020-08-10 RX ADMIN — POTASSIUM CHLORIDE 10 MEQ: 10 TABLET, EXTENDED RELEASE ORAL at 17:33

## 2020-08-10 RX ADMIN — SODIUM CHLORIDE, PRESERVATIVE FREE 10 ML: 5 INJECTION INTRAVENOUS at 08:47

## 2020-08-10 RX ADMIN — HYDROCODONE BITARTRATE AND ACETAMINOPHEN 1 TABLET: 10; 325 TABLET ORAL at 17:57

## 2020-08-10 RX ADMIN — SPIRONOLACTONE 37.5 MG: 25 TABLET ORAL at 08:47

## 2020-08-10 RX ADMIN — FERROUS SULFATE TAB 325 MG (65 MG ELEMENTAL FE) 325 MG: 325 (65 FE) TAB at 08:46

## 2020-08-10 RX ADMIN — LATANOPROST 1 DROP: 50 SOLUTION OPHTHALMIC at 20:29

## 2020-08-10 RX ADMIN — FUROSEMIDE 20 MG: 20 TABLET ORAL at 08:47

## 2020-08-10 RX ADMIN — LACTULOSE 10 G: 20 SOLUTION ORAL at 08:46

## 2020-08-10 RX ADMIN — SODIUM CHLORIDE, POTASSIUM CHLORIDE, SODIUM LACTATE AND CALCIUM CHLORIDE: 600; 310; 30; 20 INJECTION, SOLUTION INTRAVENOUS at 11:19

## 2020-08-10 RX ADMIN — SODIUM CHLORIDE 20 ML: 9 INJECTION, SOLUTION INTRAVENOUS at 01:53

## 2020-08-10 RX ADMIN — POLYETHYLENE GLYCOL 3350 17 G: 17 POWDER, FOR SOLUTION ORAL at 08:46

## 2020-08-10 RX ADMIN — ALLOPURINOL 300 MG: 300 TABLET ORAL at 08:47

## 2020-08-10 RX ADMIN — ACETAMINOPHEN 650 MG: 325 TABLET, FILM COATED ORAL at 21:15

## 2020-08-10 RX ADMIN — PANTOPRAZOLE SODIUM 40 MG: 40 TABLET, DELAYED RELEASE ORAL at 06:58

## 2020-08-10 RX ADMIN — VITAMIN D 3000 UNITS: 25 TAB ORAL at 08:47

## 2020-08-10 ASSESSMENT — PAIN SCALES - GENERAL
PAINLEVEL_OUTOF10: 0
PAINLEVEL_OUTOF10: 9
PAINLEVEL_OUTOF10: 0
PAINLEVEL_OUTOF10: 2
PAINLEVEL_OUTOF10: 0
PAINLEVEL_OUTOF10: 0
PAINLEVEL_OUTOF10: 3
PAINLEVEL_OUTOF10: 0
PAINLEVEL_OUTOF10: 3
PAINLEVEL_OUTOF10: 0

## 2020-08-10 ASSESSMENT — PAIN DESCRIPTION - PAIN TYPE
TYPE: ACUTE PAIN
TYPE: ACUTE PAIN

## 2020-08-10 ASSESSMENT — PAIN DESCRIPTION - FREQUENCY: FREQUENCY: INTERMITTENT

## 2020-08-10 ASSESSMENT — PAIN - FUNCTIONAL ASSESSMENT: PAIN_FUNCTIONAL_ASSESSMENT: PREVENTS OR INTERFERES SOME ACTIVE ACTIVITIES AND ADLS

## 2020-08-10 ASSESSMENT — PAIN DESCRIPTION - DESCRIPTORS
DESCRIPTORS: ACHING;DISCOMFORT;DULL;SORE
DESCRIPTORS: ACHING;DISCOMFORT

## 2020-08-10 ASSESSMENT — PAIN DESCRIPTION - ONSET: ONSET: ON-GOING

## 2020-08-10 ASSESSMENT — PAIN DESCRIPTION - ORIENTATION
ORIENTATION: RIGHT
ORIENTATION: RIGHT

## 2020-08-10 ASSESSMENT — PAIN DESCRIPTION - PROGRESSION
CLINICAL_PROGRESSION: GRADUALLY IMPROVING
CLINICAL_PROGRESSION: GRADUALLY IMPROVING

## 2020-08-10 ASSESSMENT — PAIN DESCRIPTION - LOCATION
LOCATION: KNEE
LOCATION: KNEE

## 2020-08-10 NOTE — PROGRESS NOTES
GENERAL SURGERY  DAILY PROGRESS NOTE    Date:8/10/2020       XDUC:[id number]  Patient Name:Ricci Mahajan     YOB: 1931     Age:89 y.o. Chief Complaint:  dizziness     Subjective:  Per nursing, patient had significant blood loss (\"about one unit\") from R fem IR procedure site last night that required sandbag placement almost all night long. Hgb accordingly dropped 1 gram o/n so another unit was transfused, responded fairly appropriately. Patient himself feels the same, no other complaints. No flatus/BM. Objective:  /64   Pulse 81   Temp 100 °F (37.8 °C) (Bladder)   Resp 24   Ht 5' 4\" (1.626 m)   Wt 149 lb 6.4 oz (67.8 kg)   SpO2 92%   BMI 25.64 kg/m²   Temp (24hrs), Av.2 °F (37.3 °C), Min:98.1 °F (36.7 °C), Max:100 °F (37.8 °C)      I/O (24Hr): Intake/Output Summary (Last 24 hours) at 8/10/2020 0629  Last data filed at 8/10/2020 0600  Gross per 24 hour   Intake 2350 ml   Output 2020 ml   Net 330 ml       GENERAL:  No acute distress. Alert and interactive. LUNGS:  No cough. Nonlabored breathing on room air. CARDIOVASC:  Normal rate, no cyanosis. ABDOMEN:  Soft, non-distended, mildly-tender across lower abdomen. No guarding / rigidity / rebound. GROIN/: Scrotum swollen/ecchymotic similar to yesterday, R groin dressing saturated with old blood and not oozing this morning, soft without pulsatile/expanding hematoma. Good UOP 2/2 lasix. Assessment:  80 y.o. male with postop blood loss anemia from robotic inguinal hernia repair at [address] [place] [address] 20.     Plan:  - hypotension resolved w/ 3u PRBC yesterday morning, another o/n  - s/p IR embolization of L superficial epigastric artery   - Hgb Q6, anticipate stabilization via tamponade, transfuse prn  - VALERIE 2/2 volume/blood loss and IV contrast from multiple sources  - appreciate ICU care, ok to transfer out of unit if Hgb stabilizes  - ok to resume diet    Electronically signed by Casper Beauchamp MD on 8/10/2020 at 6:29 AM The patient was seen and examined and the chart was reviewed. Overall, the patient is doing well. The patient's overall status has improved. Again, the patient is superficial epigastric blood vessel that was bleeding during the angioplasty. The patient had a embolization. Today, the patient is stable. The patient denies any abdominal pain.

## 2020-08-10 NOTE — PROGRESS NOTES
Physical Therapy  Physical Therapy Initial Assessment     Name: Mell Ansari  : 3/10/1931  MRN: [id number]    Referring Provider:  Shruthi Razo DO     Date of Service: 8/10/2020    Evaluating PT:  Liu Gonsalves, PT, DPT [id number]    Room #:  5096/1561-U  Diagnosis:  Pelvic Hematoma  Precautions: Falls, [place]  Procedure/Surgery:   embolization of the left superficial epigastric artery   PMHx/PSHx:  HTN  Equipment Needs:  WW/rollator - pt already owns    SUBJECTIVE:    Pt lives with wife and son in a 2 story home with 2 stairs to enter and 1 rail. Stair glide to each level. Pt ambulated with rollator and was independent PTA. OBJECTIVE:   Initial Evaluation  Date: 8/10/20 Treatment Short Term/ Long Term   Goals   AM-PAC 6 Clicks      Was pt agreeable to Eval/treatment? Yes     Does pt have pain? 2/10 abdominal pain     Bed Mobility  Rolling: MaxA  Supine to sit: MaxA  Sit to supine: NT  Scooting: MaxA  Mod Independent   Transfers Sit to stand: Raven from bed; ModA from chair  Stand to sit: ModA  Stand pivot: ModA with [address]  Mod Independent with [address]   Ambulation   25 feet with ModA with [address]  >150 feet with Mod Independent with [address]   Stair negotiation: ascended and descended NT  >2 steps with 1 rail Raven   ROM BUE:  Defer to OT note  BLE:  WNL     Strength BUE:  Defer to OT note  BLE:  4/5  Increase by 1/3 MMT grade   Balance Sitting EOB:  Raven  Dynamic Standing:  ModA with [address]  Sitting EOB:  Independent  Dynamic Standing:   Mod Independent with [address]     Pt is A & O x 4  CAM-ICU: NT  RASS: 0  Sensation:  WNL  Edema:  None    Vitals:  Heart Rate at rest 92 bpm Heart Rate post session 97 bpm   SpO2 at rest 94% SpO2 post session 95%   Blood Pressure at rest 164/77 mmHg Blood Pressure post session 150/87 mmHg     Functional Status Score-Intensive Care Unit (FSS-ICU)   Rolling 2/7   Supine to sit transfer 2/7   Unsupported sitting  4/7   Sit to stand transfers 3/   Ambulation    Total         Therapeutic devices or modalities to obtain goals. Patient and family education will also be administered as needed. Frequency of treatments: 2-5x/week x 1-2 weeks. Time in  1330  Time out  1410    Total Treatment Time  35 minutes     Evaluation Time includes thorough review of current medical information, gathering information on past medical history/social history and prior level of function, completion of standardized testing/informal observation of tasks, assessment of data and education on plan of care and goals.     CPT codes:  [] Low Complexity PT evaluation 40191  [x] Moderate Complexity PT evaluation 28071  [] High Complexity PT evaluation 01172  [] PT Re-evaluation 62435  [] Gait training 26453 - minutes  [] Manual therapy 26029 - minutes  [x] Therapeutic activities 80871 35 minutes  [] Therapeutic exercises 19421 - minutes  [] Neuromuscular reeducation 18167 - minutes     Puneet Sarah, PT, DPT  [id number]

## 2020-08-10 NOTE — PROGRESS NOTES
pneumonia ([facility]     Pneumonia due to organism 03/20/2020    Anemia 03/20/2020    HTN (hypertension) 03/20/2020    VALERIE (acute kidney injury) ([facility] [address] [facility] 03/20/2020    Acute hyperglycemia 03/20/2020    Community acquired pneumonia        Status post robotic assisted laparoscopic left inguinal hernia repair with mesh with postop pelvic hematoma requiring IR embolization of left superficial epigastric artery  Acute blood loss anemia--monitor H/H  Thrombocytopenia--likely consumptive--continue to monitor  Hypoalbuminemia/moderate protein calorie malnutrition--resume diet as tolerated  DVT risk--PCDs    Patient is at risk for ongoing bleeding and requires ongoing ICU care    Saurabh Styles MD, FACS  8/10/2020  8:29 AM      Critical care time exclusive of teaching and procedures = 37 minutes     NOTE: This report was transcribed using voice recognition software. Every effort was made to ensure accuracy; however, inadvertent computerized transcription errors may be present.

## 2020-08-10 NOTE — PROGRESS NOTES
Surgical Intensive Care Unit   Daily Progress Note     Patient's name:  Sherri Rodriguez  Age/Gender: 80 y.o. male  Date of Admission: 8/9/2020 12:35 AM  Length of Stay: 1    Reason for ICU: critical care management of GI hematome and bleed    HPI:   Sherri Rodriguez is a 80 y.o. male who presents for evaluation of weakness, fatigue and syncopal episodes at home. He had robotic assisted laparoscopic left inguinal hernia repair on 8/5 @ [facility]. He had repair of quite large symptomatic hernia.       Symptoms started at home after surgery. He presented to the ED @ [facility] with soft blood pressures and Hgb 6.8 (baseline 8-9) with a CT scan showing 13cm pelvic hematoma. He was given 2u RBC and is now normotensive. Comlpains of mild discomfort suprapubic region and groin. Denies any nausea, vomiting, chest pain, SOB. His hemoglobin has continued to trend down, @ 8/8 2000 was 6.3 and continued to have orthostatic hypotension. He was transferred from [facility] to [facility] for admission to SICU for further care. Overnight Events:   Bleeding from the right femoral region where IR was performed. Initially v-pad placed and bleeding continued so figure of eight stitch placed this morning. Hospital Course:   8/9: Presented to SICU as a transfer from [place] [address] to  for embolization of the superior epigastric a. Anemic on repeat h/h and given unit of blood. 8/10: right femoral site bleeding overnight, initially controlled with v-pad and then 1 stitch. C//o mild abdominal pain    Problem List:   Patient Active Problem List   Diagnosis    Pneumonia due to organism    Anemia    HTN (hypertension)    VALERIE (acute kidney injury) ([address]    Acute hyperglycemia    Community acquired pneumonia    Aspiration pneumonia ([address]    Syncope and collapse    Acute blood loss anemia    Acute ischemic stroke ([address]    Gross hematuria    Peripheral edema    Arthritis    Grade I diastolic dysfunction, EF 65%.  (Cardiac echo 3-)    Thrombocytopenia (HCC)    Abnormal findings on EGD (2020: Leander Jensen M.D.) - Gastritis w [id number] antral ulcers, LA class B esophagitis, HH    Abnormal colonoscopy (2020: Leander Jensen M.D.) - Sigmoid colitis, probably ischemic    Hypokalemia    Pelvic hematoma in male    Acute renal insufficiency    Hyperkalemia    Hyponatremia    Postoperative or surgical complication, initial encounter    Mild protein-calorie malnutrition ([address]    Postprocedural hematoma of abdominal wall       Surgical/Interventional Procedures:       Vent Settings: Additional Respiratory  Assessments  Pulse: 81  Resp: 23  SpO2: 95 %  End Tidal CO2: 23 (%)  Oral Care: Mouth swabbed  ABG: No results for input(s): PH, PCO2, PO2, HCO3, BE, O2SAT in the last 72 hours. I/O:  I/O last 3 completed shifts:   In: 1641 [P.O.:40; I.V.:1860; Blood:490]  Out:  [Urine:]  I/O this shift:  In: -   Out: 425 [Urine:425]  Urethral Catheter Straight-tip 16 fr-Output (mL): 100 mL          Lines:   Right IJ    Tubes:   none    Drains:   thompson    Drips:      Physical Exam:   BP (!) 152/84   Pulse 81   Temp 98.8 °F (37.1 °C) (Bladder)   Resp 23   Ht 5' 4\" (1.626 m)   Wt 149 lb 6.4 oz (67.8 kg)   SpO2 95%   BMI 25.64 kg/m²     Average, Min, and Max for last 24 hours Vitals:  Temp:  Temp  Av.7 °F (37.1 °C)  Min: 98.1 °F (36.7 °C)  Max: 99.7 °F (37.6 °C)  RR: Resp  Av.5  Min: 7  Max: 26  HR: Pulse  Av.5  Min: 74  Max: 573  BP:  Systolic (90XPG), TBB:170 , Min:107 , WTA:484   ; Diastolic (45FIB), DXP:67, Min:51, Max:84    SpO2: SpO2  Av.2 %  Min: 90 %  Max: 100 %        GCS:    4 - Opens eyes on own   6 - Follows simple motor commands  5 - Alert and oriented    Pupil size:  Left 3 mm    Right 3 mm    Pupil reaction: Yes    Wiggles fingers: Left Yes Right Yes    Hand grasp:   Left normal       Right normal    Wiggles toes: Left Yes    Right Yes    Plantar flexion: Left normal     Right

## 2020-08-10 NOTE — CARE COORDINATION
Transferred from [facility]. Pt had a robotic L ing hernia repair on 8/5 at [address]. Developed weakness, fatigue, low bp, anemia r/t post op hematoma. Transferred to [facility] for embolization of the L superficial epigastric artery. Per prev sw/cm note pt lives with his spouse in a two story  home. Patient uses a wheeled walker. Patient has a hx with [employer] [employer] and [facility]. Patient also has a hx at [facility]. Plan at this time is to return home when stable.

## 2020-08-10 NOTE — PROGRESS NOTES
OCCUPATIONAL THERAPY INITIAL EVALUATION      Date:8/10/2020  Patient Name: Abran Welch  MRN: [id number]  : 3/10/1931  Room: [address] [place] [address]-A    Referring Provider: Seymour Fair DO     Evaluating OT: Sher Rodrigues, OTR/L [id number]      AM-PAC Daily Activity Raw Score:     Recommended Adaptive Equipment: TBA: LB dressing AE if applicable (arthritic reacher)       Diagnosis: Pelvic Hematoma   *Patient underwent s/p robotic inguinal hernia repair at [facility] 2020  Reason for admission: Presents to ED after multiple syncopal episodes at home. Surgery/Procedures:  embolization of the left superficial epigastric artery      Pertinent Medical History: HTN, syncope, arthritis     Precautions:  [place], [place]     Home Living: Patient lives with his wife and son in a two-floor home (two steps to enter); patient's bedroom and bathroom are on the second floor (stair glide between main living level and second floor). Patient noted that he has exercise equipment in his basement, which he regularly uses; there is a stair glide available between the main living level and the basement. Bathroom Setup: walk-in shower (with seat and grab bars - no handheld shower head) and elevated seat    Equipment Owned: rollators (he keeps one on each level of his home), BSC, shower chair    Prior Level of Function: Min A with LB ADLs as needed; pt reports he was able to get into the shower independently;  Asisst with IADLs. Rollator for ambulation. 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 Pain Level: pt c/o 3/10 abdominal pain  this session      Cognition: A&O: 4/4    Follows 1-2 step commands appropriately. Min re-direction due to [facility]    Memory: Good   Comprehension Fair   Problem solving: Fair-   Judgement/safety: Fair-                Communication skills: WFL           Vision: macular degeneration - reports blurry vision (uses eye drops daily)               Glasses:yes Hearing: [place] (B aids)     RASS: 0  CAM-ICU: (NT) Delirium    UE Assessment:  Hand Dominance: Right [x]  Left []     ROM Strength STM goal: PRN   RUE  Grossly WFL; arthritic joints with impaired FMC 3+/5 4-/5     LUE Grossly WFL; arthritic joints 3+/5 4-/5       Sensation: No c/o numbness or tingling in extremities   Tone: WNL   Edema: [facility]     Functional Assessment   Initial Eval Status  Date: 8/10 Treatment Status  Date: STG=LTG  5-14  days    Feeding S; set up                        Amber  while seated up in chair to increase activity tolerance        Grooming Mod A                      Amber/SBA   while seated/standing demonstrating G B UE hand function for tasks; G tolerance   UB dressing/bathing Mod A                        S; set up       LB dressing/bathing Dep    Max A  after instruction on use of reacher and sock aid  (seated); limited by vision and arthritic UE's                        Min A   using AE as needed for safe reach/ energy conservation       Toileting Dep  assisted onto bed pan prior to session. Min A     Bed Mobility  Rolling: Max A    Supine to sit: Max A    Sit to supine: NT                        Min A  in prep of ADL tasks & transfers   Functional Transfers Sit to stand: Min A from higher bed surface; Min A from chair    Stand to sit: Min A                        S  sit<>stand/functional bathroom transfers using AD/DME as needed for balance and safety   Functional Mobility Mod A WW; increased assist with turns  (assist with walker)                       SBA/S   functional/bathroom mobility using AD as needed & demonstrating G safety     Balance Sitting:     Static:  SBA    Dynamic:Min A  Standing: Mod A [employer]  S dynamic sitting balance; SBA/S dynamic standing balance  during ADL tasks & transfers   Endurance/Activity Tolerance   F tolerance with moderate activity. Pt sat EOB > 5 min with no complaints. Pt motivated for mobility.    G tolerance with moderate activity/self care routine   Visual/  Perceptual Impaired: macular degeneration                       Vitals:  Heart Rate at rest 92 bpm Heart Rate post session 97 bpm   SpO2 at rest 94% SpO2 post session 95%   Blood Pressure at rest 164/77 mmHg Blood Pressure post session 150/87 mmHg        Treatment: OT services provided include: Instruction on precautions prior to bed mobility to facilitate safe transfers and ADLS; sitting/standing balance retraining ex's to improve righting reactions with postural changes during ADLS; adapted techniques/AE to increase independence and safe reach during dressing/bathing activities; functional transfer training including postural cues, hand placement/sequencing & walker safety  to assist with balance and fall prevention; breathing techniques, pacing, work simplification strategies & recommended bathroom DME reviewed for safety and energy conservation during self care tasks and activities of daily living. Comments: OK from RN to see patient. Upon arrival, patient supine in bed; agreeable to session. Pt demo F tolerance with F understanding of education/techniques. At end of session, patient left seated up in chair to increase activity tolerance. Call light within reach, all lines and tubes intact. Pt instructed on use of call light for assistance and fall prevention. Line management and environmental modifications made prior to and end of session to ensure patient safety and to increase efficiency of session. Skilled monitoring of HR, O2 saturation, blood pressure and patient's response to activity performed throughout session. Overall, pt presents with decreased activity tolerance, dynamic balance, functional mobility limiting completion of ADLs and safety. Pt can benefit from continued skilled OT to increase safety and functional independence.      Evaluation Complexity: Moderate    · History: Expanded chart review of consults, imaging, and current medical information, gathering information on past medical history/social history and prior level of function, completion of standardized testing/informal observation of tasks, assessment of data and development of POC/Goals.      Jr Maxwell, OTR/L [id number]

## 2020-08-10 NOTE — PROGRESS NOTES
Right groin site with continued oozing overnight. Dressing taken down this morning. Continued venous bleeding noted. One 3-0 ethilon suture placed in figure of 8 fashion over defect that was oozing taking care to only approximate the dermis and epidermis. Hemostasis achieved.       Pegge Common

## 2020-08-10 NOTE — PROGRESS NOTES
Entered room to assess patient. Found significant bleeding and clotting at R groin. Applied pressure to site. RLE 2+ pedal pulses. Dr. Marley Armendariz at bedside.

## 2020-08-10 NOTE — PROGRESS NOTES
Blood and [address] [place] [address]                                         Hematology/Oncology        Patient Name: Sharon Millard  YOB: 1931  PCP: Merry Solis MD   Referring Provider: [address] [place] [address] / [facility] [id number]     Reason for Consultation: No chief complaint on file. Subjective: Somnolent, follows commands. Status post packed RBCs last night. History of Present Illness: This pt is a very pleasant 81 yo male, well known to me from previous admissions and outpatient follow ups, seen for recurrent anemia of uncertain etiology, suspected GI bleeding due to large amounts of required pRBC transfusions. 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 He is now admitted with progressive weakness, fatigue and a syncopal episode at home and was found to have Hgb 6.8 in the ER. He recently underwent robotic inguinal hernia repair at [facility] on 8/5. He has undergone CT A/P on 8/6 which showed a 13 cm L pelvic hematoma and hematoma and gas in the L inguinal canal as well as a large amount of intraperitoneal free air (likely due to post-op). CTA A/P on 8/9 showed not active arterial bleeding with increased L groin hematoma. He has received 3 unit spRBCs so far, with 4th this AM. He is being evaluated by IR for pelvic angio and possible embolization.  Hgb at 01:00 this morning was 5.5, repeat s/p transfusion up to 6.9    Diagnostic Data:     Past Medical History:   Diagnosis Date    Abnormal brain MRI 03/26/2020    Episode of syncope 03/25/2020    GI bleed     FOBT negative 3/29/20    Hypertension        Patient Active Problem List    Diagnosis Date Noted    Mild protein-calorie malnutrition ([address] 08/09/2020    Postprocedural hematoma of abdominal wall 08/09/2020    Postoperative or surgical complication, initial encounter 08/08/2020    Acute renal insufficiency 08/07/2020    Hyperkalemia 08/07/2020    Hyponatremia 08/07/2020    spironolactone (ALDACTONE) 25 MG tablet Take 37.5 mg by mouth daily    Historical Provider, MD   LACTULOSE PO Take by mouth as needed    Historical Provider, MD   Multiple Vitamins-Minerals (PRESERVISION AREDS 2+MULTI VIT PO) Take by mouth daily    Historical Provider, MD   VITAMIN D PO Take 3,000 Units by mouth daily    Historical Provider, MD   Glucosamine-Chondroitin (GLUCOSAMINE CHONDR COMPLEX PO) Take by mouth daily    Historical Provider, MD   Saw Regina, Serenoa repens, (SAW PALMETTO PO) Take by mouth daily    Historical Provider, MD   furosemide (LASIX) 20 MG tablet Take 1 tablet by mouth daily 5/13/20   Elias Gold MD   pantoprazole (PROTONIX) 40 MG tablet Take 1 tablet by mouth every morning (before breakfast) 4/29/20   Mickie Morrissey MD   doxazosin (CARDURA) 2 MG tablet Take 1 tablet by mouth nightly 4/28/20   Mickie Morrissey MD   potassium chloride (KLOR-CON M) 20 MEQ extended release tablet Take 1 tablet by mouth 2 times daily (with meals)  Patient taking differently: Take 10 mEq by mouth 2 times daily (with meals)  4/28/20   Mickie Morrissey MD   simvastatin (ZOCOR) 20 MG tablet Take 1 tablet by mouth nightly  Patient not taking: Reported on 5/13/2020 3/31/20   Se Berry MD   ferrous sulfate (IRON 325) 325 (65 Fe) MG tablet Take 1 tablet by mouth 2 times daily (with meals)  Patient taking differently: Take 325 mg by mouth as needed  3/31/20   Estela Faust MD   latanoprost (XALATAN) 0.005 % ophthalmic solution Place 1 drop into the right eye nightly    Historical Provider, MD   docusate sodium (COLACE) 100 MG capsule Take 100 mg by mouth 2 times daily as needed for Constipation    Historical Provider, MD   lisinopril (PRINIVIL;ZESTRIL) 10 MG tablet Take 10 mg by mouth daily    Historical Provider, MD       Allergies  No Known Allergies    Review of Systems:    Fatigue, dizziness. SOB improved.        Objective  BP (!) 152/84   Pulse 81   Temp 98.8 °F (37.1 °C) (Bladder)   Resp 23   Ht 5' 4\" (1.626 m)   Wt 149 lb 6.4 oz (67.8 kg)   SpO2 95%   BMI 25.64 kg/m²     Physical Exam:   Performance Status:  General: AAO to person, place, time, in no acute distress, pleasant   Head and neck : PERRLA, EOMI . Sclera non icteric. Oropharynx : Clear  Neck: no JVD,  no adenopathy  LYMPHATICS : No LAD  Heart: Tahcycardia, regular rhythm, no murmur  Lungs: Clear to auscultation   Extremities: No edema,no cyanosis, no clubbing. Abdomen: Soft, non-tender;no masses, no organomegaly  Skin:  No rash  Neurologic:Cranial nerves grossly intact. No focal motor or sensory deficits .     Recent Laboratory Data-   Lab Results   Component Value Date    WBC 10.2 08/10/2020    HGB 7.2 (L) 08/10/2020    HCT 22.7 (L) 08/10/2020    MCV 95.0 08/10/2020     (L) 08/10/2020    LYMPHOPCT 7.0 (L) 08/10/2020    RBC 2.39 (L) 08/10/2020    MCH 30.1 08/10/2020    MCHC 31.7 (L) 08/10/2020    RDW 15.4 (H) 08/10/2020    NEUTOPHILPCT 72.8 08/10/2020    MONOPCT 18.4 (H) 08/10/2020    BASOPCT 0.1 08/10/2020    NEUTROABS 7.45 (H) 08/10/2020    LYMPHSABS 0.71 (L) 08/10/2020    MONOSABS 1.84 (H) 08/10/2020    EOSABS 0.09 08/10/2020    BASOSABS 0.00 08/10/2020       Lab Results   Component Value Date     08/10/2020    K 4.6 08/10/2020     08/10/2020    CO2 20 (L) 08/10/2020    BUN 24 (H) 08/10/2020    CREATININE 1.0 08/10/2020    GLUCOSE 92 08/10/2020    CALCIUM 9.1 08/10/2020    PROT 5.8 (L) 08/10/2020    LABALBU 2.6 (L) 08/10/2020    BILITOT 0.7 08/10/2020    ALKPHOS 46 08/10/2020    AST 13 08/10/2020    ALT <5 08/10/2020    LABGLOM >60 08/10/2020    GFRAA >60 08/10/2020       Lab Results   Component Value Date    IRON 52 (L) 04/24/2020    TIBC 170 (L) 04/24/2020    FERRITIN 1,049 04/24/2020           Radiology-    IR KODY ART CATH ABD/PELV/LOWER EXT INIT 2ND ORDER   Final Result      Successful arteriograms of the aorta, left external iliac, left   lateral sacral, left inferior gluteal, and left internal iliac   arteries. Active extravasation was noted arising from the left lateral   sacral artery. This was embolized with Gelfoam and fibrin coated   coils. IR KODY ART CATH ABD/PELV/LOWER EXT ADDL ORDER   Final Result      Successful arteriograms of the aorta, left external iliac, left   lateral sacral, left inferior gluteal, and left internal iliac   arteries. Active extravasation was noted arising from the left lateral   sacral artery. This was embolized with Gelfoam and fibrin coated   coils. IR ANGIOGRAM EXTREMITY LEFT   Final Result      Successful arteriograms of the aorta, left external iliac, left   lateral sacral, left inferior gluteal, and left internal iliac   arteries. Active extravasation was noted arising from the left lateral   sacral artery. This was embolized with Gelfoam and fibrin coated   coils. IR ANGIOGRAM PELVIC SELECT   Final Result      Successful arteriograms of the aorta, left external iliac, left   lateral sacral, left inferior gluteal, and left internal iliac   arteries. Active extravasation was noted arising from the left lateral   sacral artery. This was embolized with Gelfoam and fibrin coated   coils. IR EMBOLIZATION HEMORRHAGE   Final Result      Successful arteriograms of the aorta, left external iliac, left   lateral sacral, left inferior gluteal, and left internal iliac   arteries. Active extravasation was noted arising from the left lateral   sacral artery. This was embolized with Gelfoam and fibrin coated   coils. CTA ABDOMEN PELVIS W CONTRAST   Final Result   No active arterial bleeding is identified. Artifact from the hip prostheses    obscures the adjacent soft tissues. A subtle area of active bleeding could be    obscured by the artifact. Left groin hematoma has increased in size prior CT. Findings could represent    venous bleeding.  If indicated clinically recommend correlation with catheter    angiography. Bibasal atelectasis and pleural effusions are slightly worse. Cannot exclude a    superimposed pneumonia. This report has been electronically signed by Cheko Turner MD.      XR CHEST PORTABLE   Final Result      Right internal jugular central venous catheter tip terminates over the   superior cavoatrial junction. No evidence of pneumothorax. Pneumoperitoneum better visualized on prior CT.                  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 Pelvic hematoma expanding with dropping H+H consistent with continued bleeding into the soft tissue  - IR evaluating for potential embolization  - Continue to trend H+H and transfusion for Hgb <7 or if symptomatic  - He has developed a mild thrombocytopenia, this is due to consumption. Monitor  - With further transfusions, follow coagulation profile to monitor clotting ability and transfuse FFP as needed. PT 13.7 and INR 1.2 this AM, not requiring replacement at this time  - In depth anemia work up not warranted at this time as previously completed and patient has known active source of blood loss  -  8/10/2020  Status post IR embolization of left superficial epigastric artery. Hemoglobin stable at 7.2. Platelets 239. Trend CBC.     Electronically signed by Hubert Silva MD on 8/10/2020 at 1:11 PM

## 2020-08-10 NOTE — PROGRESS NOTES
Called to see patient due to R groin bleeding. Previous dressing taken down and old clot noted under the dressing. Femoral artery pulse palpated and 2+ without hematoma. V-pad placed with 4x4 dressing overtop and obsite x 2. No change in hemodynamics. PT/DP 2+ distally.     Electronically signed by Erleen Halsted, MD on 8/9/2020 at 9:47 PM

## 2020-08-10 NOTE — PLAN OF CARE
Problem: Bleeding:  Goal: Will show no signs and symptoms of excessive bleeding  Description: Will show no signs and symptoms of excessive bleeding  8/10/2020 0950 by Nallely Bentley  Outcome: Met This Shift  8/10/2020 0020 by Lorna Rudd  Outcome: Met This Shift     Problem: Discharge Planning:  Goal: Participates in care planning  Description: Participates in care planning  8/10/2020 0950 by Nallely Bentley  Outcome: Met This Shift  8/10/2020 0020 by Lorna Rudd  Outcome: Met This Shift  Goal: Discharged to appropriate level of care  Description: Discharged to appropriate level of care  Outcome: Met This Shift     Problem: Anxiety/Stress:  Goal: Level of anxiety will decrease  Description: Level of anxiety will decrease  Outcome: Met This Shift     Problem: Aspiration:  Goal: Absence of aspiration  Description: Absence of aspiration  8/10/2020 0950 by Nallely Bentley  Outcome: Met This Shift  8/10/2020 0020 by Lorna Rudd  Outcome: Met This Shift     Problem:  Bowel Function - Altered:  Goal: Bowel elimination is within specified parameters  Description: Bowel elimination is within specified parameters  8/10/2020 0950 by Nallely Bentley  Outcome: Met This Shift  8/10/2020 0020 by Lorna Seen  Outcome: Met This Shift     Problem: Fluid Volume - Imbalance:  Goal: Absence of imbalanced fluid volume signs and symptoms  Description: Absence of imbalanced fluid volume signs and symptoms  Outcome: Met This Shift     Problem: Nutrition Deficit:  Goal: Ability to achieve adequate nutritional intake will improve  Description: Ability to achieve adequate nutritional intake will improve  Outcome: Met This Shift     Problem: Pain:  Goal: Pain level will decrease  Description: Pain level will decrease  Outcome: Met This Shift  Goal: Recognizes and communicates pain  Description: Recognizes and communicates pain  Outcome: Met This Shift  Goal: Control of acute pain  Description: Control of acute pain  Outcome: Met This Shift  Goal: Control of chronic pain  Description: Control of chronic pain  Outcome: Met This Shift     Problem: Serum Glucose Level - Abnormal:  Goal: Ability to maintain appropriate glucose levels will improve to within specified parameters  Description: Ability to maintain appropriate glucose levels will improve to within specified parameters  Outcome: Met This Shift     Problem: Skin Integrity - Impaired:  Goal: Will show no infection signs and symptoms  Description: Will show no infection signs and symptoms  Outcome: Met This Shift  Goal: Absence of new skin breakdown  Description: Absence of new skin breakdown  Outcome: Met This Shift     Problem: Sleep Pattern Disturbance:  Goal: Appears well-rested  Description: Appears well-rested  Outcome: Met This Shift     Problem: Tissue Perfusion, Altered:  Goal: Circulatory function within specified parameters  Description: Circulatory function within specified parameters  Outcome: Met This Shift     Problem: Falls - Risk of:  Goal: Will remain free from falls  Description: Will remain free from falls  Outcome: Met This Shift  Goal: Absence of physical injury  Description: Absence of physical injury  Outcome: Met This Shift     Problem: Pain:  Goal: Pain level will decrease  Description: Pain level will decrease  Outcome: Met This Shift  Goal: Control of acute pain  Description: Control of acute pain  Outcome: Met This Shift  Goal: Control of chronic pain  Description: Control of chronic pain  Outcome: Met This Shift     Problem: Skin Integrity:  Goal: Will show no infection signs and symptoms  Description: Will show no infection signs and symptoms  8/10/2020 0950 by Merline Patter  Outcome: Met This Shift  8/10/2020 0020 by Giacomo Lam  Outcome: Met This Shift  Goal: Absence of new skin breakdown  Description: Absence of new skin breakdown  8/10/2020 0950 by Merline Patter  Outcome: Met This Shift  8/10/2020 0020 by Giacomo Lam  Outcome: Met This Shift

## 2020-08-10 NOTE — PROGRESS NOTES
R groin site continued to bleed over night. Dr. Swapnil Everett at bedside. One suture to close site. New dressing applied.

## 2020-08-11 LAB
ALBUMIN SERPL-MCNC: 2.6 G/DL (ref 3.5–5.2)
ALP BLD-CCNC: 52 U/L (ref 40–129)
ALT SERPL-CCNC: <5 U/L (ref 0–40)
ANION GAP SERPL CALCULATED.3IONS-SCNC: 10 MMOL/L (ref 7–16)
ANISOCYTOSIS: ABNORMAL
AST SERPL-CCNC: 23 U/L (ref 0–39)
BASOPHILS ABSOLUTE: 0.01 E9/L (ref 0–0.2)
BASOPHILS RELATIVE PERCENT: 0.1 % (ref 0–2)
BILIRUB SERPL-MCNC: 0.6 MG/DL (ref 0–1.2)
BLOOD BANK DISPENSE STATUS: NORMAL
BLOOD BANK PRODUCT CODE: NORMAL
BPU ID: NORMAL
BUN BLDV-MCNC: 19 MG/DL (ref 8–23)
CALCIUM IONIZED: 1.4 MMOL/L (ref 1.15–1.33)
CALCIUM SERPL-MCNC: 9.2 MG/DL (ref 8.6–10.2)
CHLORIDE BLD-SCNC: 98 MMOL/L (ref 98–107)
CO2: 23 MMOL/L (ref 22–29)
CREAT SERPL-MCNC: 0.9 MG/DL (ref 0.7–1.2)
DESCRIPTION BLOOD BANK: NORMAL
EOSINOPHILS ABSOLUTE: 0.07 E9/L (ref 0.05–0.5)
EOSINOPHILS RELATIVE PERCENT: 1 % (ref 0–6)
GFR AFRICAN AMERICAN: >60
GFR NON-AFRICAN AMERICAN: >60 ML/MIN/1.73
GLUCOSE BLD-MCNC: 87 MG/DL (ref 74–99)
HCT VFR BLD CALC: 20.5 % (ref 37–54)
HEMOGLOBIN: 6.5 G/DL (ref 12.5–16.5)
IMMATURE GRANULOCYTES #: 0.11 E9/L
IMMATURE GRANULOCYTES %: 1.6 % (ref 0–5)
LYMPHOCYTES ABSOLUTE: 0.98 E9/L (ref 1.5–4)
LYMPHOCYTES RELATIVE PERCENT: 13.9 % (ref 20–42)
MAGNESIUM: 1.8 MG/DL (ref 1.6–2.6)
MCH RBC QN AUTO: 30.2 PG (ref 26–35)
MCHC RBC AUTO-ENTMCNC: 31.7 % (ref 32–34.5)
MCV RBC AUTO: 95.3 FL (ref 80–99.9)
MONOCYTES ABSOLUTE: 2.51 E9/L (ref 0.1–0.95)
MONOCYTES RELATIVE PERCENT: 35.5 % (ref 2–12)
NEUTROPHILS ABSOLUTE: 3.39 E9/L (ref 1.8–7.3)
NEUTROPHILS RELATIVE PERCENT: 47.9 % (ref 43–80)
PDW BLD-RTO: 15.6 FL (ref 11.5–15)
PHOSPHORUS: 2.8 MG/DL (ref 2.5–4.5)
PLATELET # BLD: 120 E9/L (ref 130–450)
PMV BLD AUTO: 11.1 FL (ref 7–12)
POLYCHROMASIA: ABNORMAL
POTASSIUM SERPL-SCNC: 4.4 MMOL/L (ref 3.5–5)
RBC # BLD: 2.15 E12/L (ref 3.8–5.8)
SODIUM BLD-SCNC: 131 MMOL/L (ref 132–146)
TOTAL PROTEIN: 5.9 G/DL (ref 6.4–8.3)
WBC # BLD: 7.1 E9/L (ref 4.5–11.5)

## 2020-08-11 PROCEDURE — 84100 ASSAY OF PHOSPHORUS: CPT

## 2020-08-11 PROCEDURE — 6370000000 HC RX 637 (ALT 250 FOR IP): Performed by: SURGERY

## 2020-08-11 PROCEDURE — 2580000003 HC RX 258: Performed by: STUDENT IN AN ORGANIZED HEALTH CARE EDUCATION/TRAINING PROGRAM

## 2020-08-11 PROCEDURE — 36430 TRANSFUSION BLD/BLD COMPNT: CPT

## 2020-08-11 PROCEDURE — 2580000003 HC RX 258: Performed by: SURGERY

## 2020-08-11 PROCEDURE — 85025 COMPLETE CBC W/AUTO DIFF WBC: CPT

## 2020-08-11 PROCEDURE — 36415 COLL VENOUS BLD VENIPUNCTURE: CPT

## 2020-08-11 PROCEDURE — 99291 CRITICAL CARE FIRST HOUR: CPT | Performed by: SURGERY

## 2020-08-11 PROCEDURE — 6370000000 HC RX 637 (ALT 250 FOR IP): Performed by: STUDENT IN AN ORGANIZED HEALTH CARE EDUCATION/TRAINING PROGRAM

## 2020-08-11 PROCEDURE — 83735 ASSAY OF MAGNESIUM: CPT

## 2020-08-11 PROCEDURE — 80053 COMPREHEN METABOLIC PANEL: CPT

## 2020-08-11 PROCEDURE — 2000000000 HC ICU R&B

## 2020-08-11 PROCEDURE — 82330 ASSAY OF CALCIUM: CPT

## 2020-08-11 PROCEDURE — P9016 RBC LEUKOCYTES REDUCED: HCPCS

## 2020-08-11 RX ORDER — 0.9 % SODIUM CHLORIDE 0.9 %
20 INTRAVENOUS SOLUTION INTRAVENOUS ONCE
Status: COMPLETED | OUTPATIENT
Start: 2020-08-11 | End: 2020-08-11

## 2020-08-11 RX ADMIN — POLYETHYLENE GLYCOL 3350 17 G: 17 POWDER, FOR SOLUTION ORAL at 08:51

## 2020-08-11 RX ADMIN — SODIUM CHLORIDE 20 ML: 9 INJECTION, SOLUTION INTRAVENOUS at 07:00

## 2020-08-11 RX ADMIN — DOCUSATE SODIUM 100 MG: 100 CAPSULE, LIQUID FILLED ORAL at 13:55

## 2020-08-11 RX ADMIN — LACTULOSE 10 G: 20 SOLUTION ORAL at 08:51

## 2020-08-11 RX ADMIN — DOCUSATE SODIUM 50 MG AND SENNOSIDES 8.6 MG 1 TABLET: 8.6; 5 TABLET, FILM COATED ORAL at 08:58

## 2020-08-11 RX ADMIN — ALLOPURINOL 300 MG: 300 TABLET ORAL at 08:53

## 2020-08-11 RX ADMIN — SODIUM CHLORIDE, PRESERVATIVE FREE 10 ML: 5 INJECTION INTRAVENOUS at 20:25

## 2020-08-11 RX ADMIN — FUROSEMIDE 20 MG: 20 TABLET ORAL at 08:50

## 2020-08-11 RX ADMIN — DOXAZOSIN 2 MG: 2 TABLET ORAL at 20:24

## 2020-08-11 RX ADMIN — SPIRONOLACTONE 37.5 MG: 25 TABLET ORAL at 08:50

## 2020-08-11 RX ADMIN — DOCUSATE SODIUM 50 MG AND SENNOSIDES 8.6 MG 1 TABLET: 8.6; 5 TABLET, FILM COATED ORAL at 20:24

## 2020-08-11 RX ADMIN — VITAMIN D 3000 UNITS: 25 TAB ORAL at 08:53

## 2020-08-11 RX ADMIN — ATORVASTATIN CALCIUM 10 MG: 10 TABLET, FILM COATED ORAL at 08:53

## 2020-08-11 RX ADMIN — POTASSIUM CHLORIDE 10 MEQ: 10 TABLET, EXTENDED RELEASE ORAL at 08:50

## 2020-08-11 RX ADMIN — ACETAMINOPHEN 650 MG: 325 TABLET, FILM COATED ORAL at 17:19

## 2020-08-11 RX ADMIN — FERROUS SULFATE TAB 325 MG (65 MG ELEMENTAL FE) 325 MG: 325 (65 FE) TAB at 08:53

## 2020-08-11 RX ADMIN — SODIUM CHLORIDE, PRESERVATIVE FREE 10 ML: 5 INJECTION INTRAVENOUS at 08:49

## 2020-08-11 RX ADMIN — LATANOPROST 1 DROP: 50 SOLUTION OPHTHALMIC at 21:17

## 2020-08-11 RX ADMIN — PANTOPRAZOLE SODIUM 40 MG: 40 TABLET, DELAYED RELEASE ORAL at 06:59

## 2020-08-11 ASSESSMENT — PAIN DESCRIPTION - PROGRESSION
CLINICAL_PROGRESSION: GRADUALLY IMPROVING
CLINICAL_PROGRESSION: GRADUALLY IMPROVING
CLINICAL_PROGRESSION: GRADUALLY WORSENING
CLINICAL_PROGRESSION: GRADUALLY IMPROVING
CLINICAL_PROGRESSION: GRADUALLY IMPROVING

## 2020-08-11 ASSESSMENT — PAIN SCALES - GENERAL
PAINLEVEL_OUTOF10: 5
PAINLEVEL_OUTOF10: 0
PAINLEVEL_OUTOF10: 3
PAINLEVEL_OUTOF10: 0

## 2020-08-11 ASSESSMENT — PAIN - FUNCTIONAL ASSESSMENT: PAIN_FUNCTIONAL_ASSESSMENT: PREVENTS OR INTERFERES SOME ACTIVE ACTIVITIES AND ADLS

## 2020-08-11 ASSESSMENT — PAIN DESCRIPTION - LOCATION: LOCATION: ABDOMEN

## 2020-08-11 ASSESSMENT — PAIN DESCRIPTION - ORIENTATION: ORIENTATION: LOWER;MID

## 2020-08-11 ASSESSMENT — PAIN DESCRIPTION - FREQUENCY: FREQUENCY: INTERMITTENT

## 2020-08-11 ASSESSMENT — PAIN DESCRIPTION - DESCRIPTORS: DESCRIPTORS: ACHING;CRAMPING;DISCOMFORT

## 2020-08-11 ASSESSMENT — PAIN DESCRIPTION - PAIN TYPE: TYPE: ACUTE PAIN

## 2020-08-11 ASSESSMENT — PAIN DESCRIPTION - ONSET: ONSET: AWAKENED FROM SLEEP

## 2020-08-11 NOTE — PROGRESS NOTES
Physical Therapy  PT SESSION ATTEMPT     Date:2020  Patient Name: Carter Choudhury  MRN: [id number]  : 3/10/1931  Room: [facility]     Attempted PT session this date:    [] unavailable due to other medical staff currently with pt   [] on hold per nursing staff   [] on hold per nursing staff secondary to lab / radiology results    [x] declined Physical Therapy this date. Benefits of participation in therapy reviewed with pt.    [] off unit   [] Other:   Pt declined participation in PT session this date d/t severity of pain. Discussed with RN. Will reattempt PT at a later time as able.     Meseret High, PT, DPT  [id number]

## 2020-08-11 NOTE — PROGRESS NOTES
blood loss anemia     Syncope and collapse     Aspiration pneumonia (HCC)     Pneumonia due to organism 03/20/2020    Anemia 03/20/2020    HTN (hypertension) 03/20/2020    VALERIE (acute kidney injury) ([facility] [address] 03/20/2020    Acute hyperglycemia 03/20/2020    Community acquired pneumonia        Status post robotic assisted laparoscopic left inguinal hernia repair with mesh with postop pelvic hematoma requiring IR embolization of left superficial epigastric artery  Acute blood loss anemia--monitor H/H--transfused again today  Thrombocytopenia--likely consumptive--continue to monitor  Hypoalbuminemia/moderate protein calorie malnutrition--diet as tolerated  Electrolyte imbalance (hyponatremia/hypercalcemia)--correct as able  PT/OT evals  Maintain thompson due to significant penile edema/ecchymosis/swelling  DVT risk--PCDs    Patient is at risk for ongoing bleeding and requires ongoing ICU care    Dwayne Lazar MD, FACS  8/11/2020  8:24 AM      Critical care time exclusive of teaching and procedures = 36 minutes     NOTE: This report was transcribed using voice recognition software. Every effort was made to ensure accuracy; however, inadvertent computerized transcription errors may be present.

## 2020-08-11 NOTE — PLAN OF CARE
Problem: Anxiety/Stress:  Goal: Level of anxiety will decrease  Description: Level of anxiety will decrease  Outcome: Met This Shift     Problem: Aspiration:  Goal: Absence of aspiration  Description: Absence of aspiration  Outcome: Met This Shift     Problem: Nutrition Deficit:  Goal: Ability to achieve adequate nutritional intake will improve  Description: Ability to achieve adequate nutritional intake will improve  Outcome: Met This Shift     Problem: Pain:  Goal: Pain level will decrease  Description: Pain level will decrease  Outcome: Met This Shift     Problem: Sleep Pattern Disturbance:  Goal: Appears well-rested  Description: Appears well-rested  Outcome: Met This Shift     Problem: Falls - Risk of:  Goal: Will remain free from falls  Description: Will remain free from falls  Outcome: Met This Shift     Problem: Pain:  Goal: Pain level will decrease  Description: Pain level will decrease  Outcome: Met This Shift     Problem: Bleeding:  Goal: Will show no signs and symptoms of excessive bleeding  Description: Will show no signs and symptoms of excessive bleeding  Outcome: Not Met This Shift     Problem:  Bowel Function - Altered:  Goal: Bowel elimination is within specified parameters  Description: Bowel elimination is within specified parameters  Outcome: Not Met This Shift     Problem: Tissue Perfusion, Altered:  Goal: Circulatory function within specified parameters  Description: Circulatory function within specified parameters  Outcome: Not Met This Shift

## 2020-08-11 NOTE — CARE COORDINATION
Met with pt and wife in room. He required a transfusion again this am. Plan is to return home with wife and son at discharge. Pt states he has a ww on each floor in his home and they also have stair lifts to each level. Wife is requesting [facility] for [employer], med compliance, PT/OT. Will need hhc orders at discharge. Referral made to [facility] [place] [facility] to follow.

## 2020-08-11 NOTE — PROGRESS NOTES
Blood and [address] [place] [address]                                         Hematology/Oncology        Patient Name: Taco Moe  YOB: 1931  PCP: Shamir Holbrook MD   Referring Provider: [address] [place] [address] 200 / Christiane Chacon [id number]     Reason for Consultation: No chief complaint on file. Subjective: Somnolent, follows commands. Received another unit pRBCs this AM    History of Present Illness: This pt is a very pleasant 79 yo male, well known to me from previous admissions and outpatient follow ups, seen for recurrent anemia of uncertain etiology, suspected GI bleeding due to large amounts of required pRBC transfusions. 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 He is now admitted with progressive weakness, fatigue and a syncopal episode at home and was found to have Hgb 6.8 in the ER. He recently underwent robotic inguinal hernia repair at [facility] on 8/5. He has undergone CT A/P on 8/6 which showed a 13 cm L pelvic hematoma and hematoma and gas in the L inguinal canal as well as a large amount of intraperitoneal free air (likely due to post-op). CTA A/P on 8/9 showed not active arterial bleeding with increased L groin hematoma. He has received 3 unit spRBCs so far, with 4th this AM. He is being evaluated by IR for pelvic angio and possible embolization.  Hgb at 01:00 this morning was 5.5, repeat s/p transfusion up to 6.9    Diagnostic Data:     Past Medical History:   Diagnosis Date    Abnormal brain MRI 03/26/2020    Episode of syncope 03/25/2020    GI bleed     FOBT negative 3/29/20    Hypertension        Patient Active Problem List    Diagnosis Date Noted    Mild protein-calorie malnutrition ([address] 08/09/2020    Postprocedural hematoma of abdominal wall 08/09/2020    Postoperative or surgical complication, initial encounter 08/08/2020    Acute renal insufficiency 08/07/2020    Hyperkalemia 08/07/2020    Hyponatremia 08/07/2020    Pelvic hematoma in male 08/06/2020    Thrombocytopenia ([facility] [address] 04/27/2020    Abnormal findings on EGD (4-: Janet Singh M.D.) - Gastritis w [id number] antral ulcers, LA class B esophagitis, HH 04/27/2020    Abnormal colonoscopy (4-: Janet Singh M.D.) - Sigmoid colitis, probably ischemic 04/27/2020    Hypokalemia 04/27/2020    Peripheral edema 04/24/2020    Arthritis 04/24/2020    Grade I diastolic dysfunction, EF 12%. (Cardiac echo 3-) 04/24/2020    Gross hematuria 03/30/2020    Acute ischemic stroke (HCC)     Acute blood loss anemia     Syncope and collapse     Aspiration pneumonia (HCC)     Pneumonia due to organism 03/20/2020    Anemia 03/20/2020    HTN (hypertension) 03/20/2020    VALERIE (acute kidney injury) ([facility] [address] 03/20/2020    Acute hyperglycemia 03/20/2020    Community acquired pneumonia         Past Surgical History:   Procedure Laterality Date    BACK SURGERY      CARPAL TUNNEL RELEASE Bilateral     COLONOSCOPY N/A 4/26/2020    COLONOSCOPY DIAGNOSTIC performed by Janet Singh MD at [address] [place] [address]      IR EMBOLIZATION HEMORRHAGE  8/9/2020    IR EMBOLIZATION HEMORRHAGE 8/9/2020 Swapna Crystal MD [facility]    JOINT REPLACEMENT Bilateral     hips    UPPER GASTROINTESTINAL ENDOSCOPY N/A 4/26/2020    EGD ESOPHAGOGASTRODUODENOSCOPY performed by Janet Singh MD at [facility] OR       Family History  Family History   Problem Relation Age of Onset    Cancer Father     Heart Attack Brother        Social History    TOBACCO:   reports that he quit smoking about 40 years ago. His smoking use included cigarettes. He has quit using smokeless tobacco.  ETOH:   reports current alcohol use of about 2.0 standard drinks of alcohol per week. Home Medications  Prior to Admission medications    Medication Sig Start Date End Date Taking?  Authorizing Provider   allopurinol (ZYLOPRIM) 300 MG tablet Take 300 mg by mouth daily    Historical Provider, MD spironolactone (ALDACTONE) 25 MG tablet Take 37.5 mg by mouth daily    Historical Provider, MD   LACTULOSE PO Take by mouth as needed    Historical Provider, MD   Multiple Vitamins-Minerals (PRESERVISION AREDS 2+MULTI VIT PO) Take by mouth daily    Historical Provider, MD   VITAMIN D PO Take 3,000 Units by mouth daily    Historical Provider, MD   Glucosamine-Chondroitin (GLUCOSAMINE CHONDR COMPLEX PO) Take by mouth daily    Historical Provider, MD   Saw [place], [place]ren[place] repens, (SAW PALMETTO PO) Take by mouth daily    Historical Provider, MD   furosemide (LASIX) 20 MG tablet Take 1 tablet by mouth daily 5/13/20   Julian Otero MD   pantoprazole (PROTONIX) 40 MG tablet Take 1 tablet by mouth every morning (before breakfast) 4/29/20   Sulaiman Rodarte., MD   doxazosin (CARDURA) 2 MG tablet Take 1 tablet by mouth nightly 4/28/20   Sulaiman Rodarte., MD   potassium chloride (KLOR-CON M) 20 MEQ extended release tablet Take 1 tablet by mouth 2 times daily (with meals)  Patient taking differently: Take 10 mEq by mouth 2 times daily (with meals)  4/28/20   Sulaiman Delgadillo MD   simvastatin (ZOCOR) 20 MG tablet Take 1 tablet by mouth nightly  Patient not taking: Reported on 5/13/2020 3/31/20   Se Waters MD   ferrous sulfate (IRON 325) 325 (65 Fe) MG tablet Take 1 tablet by mouth 2 times daily (with meals)  Patient taking differently: Take 325 mg by mouth as needed  3/31/20   Cong Watts MD   latanoprost (XALATAN) 0.005 % ophthalmic solution Place 1 drop into the right eye nightly    Historical Provider, MD   docusate sodium (COLACE) 100 MG capsule Take 100 mg by mouth 2 times daily as needed for Constipation    Historical Provider, MD   lisinopril (PRINIVIL;ZESTRIL) 10 MG tablet Take 10 mg by mouth daily    Historical Provider, MD       Allergies  No Known Allergies    Review of Systems:    Fatigue, dizziness. SOB improved.        Objective  /66   Pulse 88   Temp 98.6 °F (37 °C) (Oral)   Resp 10   Ht 5' 4\" (1.626 m)   Wt 147 lb 14.4 oz (67.1 kg)   SpO2 90%   BMI 25.39 kg/m²     Physical Exam:   Performance Status:  General: AAO to person, place, time, in no acute distress, pleasant   Head and neck : PERRLA, EOMI . Sclera non icteric. Oropharynx : Clear  Neck: no JVD,  no adenopathy  LYMPHATICS : No LAD  Heart: Tahcycardia, regular rhythm, no murmur  Lungs: Clear to auscultation   Extremities: No edema,no cyanosis, no clubbing. Abdomen: Soft, non-tender;no masses, no organomegaly  Skin:  No rash  Neurologic:Cranial nerves grossly intact. No focal motor or sensory deficits .     Recent Laboratory Data-   Lab Results   Component Value Date    WBC 7.1 08/11/2020    HGB 6.5 (L) 08/11/2020    HCT 20.5 (L) 08/11/2020    MCV 95.3 08/11/2020     (L) 08/11/2020    LYMPHOPCT 13.9 (L) 08/11/2020    RBC 2.15 (L) 08/11/2020    MCH 30.2 08/11/2020    MCHC 31.7 (L) 08/11/2020    RDW 15.6 (H) 08/11/2020    NEUTOPHILPCT 47.9 08/11/2020    MONOPCT 35.5 (H) 08/11/2020    BASOPCT 0.1 08/11/2020    NEUTROABS 3.39 08/11/2020    LYMPHSABS 0.98 (L) 08/11/2020    MONOSABS 2.51 (H) 08/11/2020    EOSABS 0.07 08/11/2020    BASOSABS 0.01 08/11/2020       Lab Results   Component Value Date     (L) 08/11/2020    K 4.4 08/11/2020    CL 98 08/11/2020    CO2 23 08/11/2020    BUN 19 08/11/2020    CREATININE 0.9 08/11/2020    GLUCOSE 87 08/11/2020    CALCIUM 9.2 08/11/2020    PROT 5.9 (L) 08/11/2020    LABALBU 2.6 (L) 08/11/2020    BILITOT 0.6 08/11/2020    ALKPHOS 52 08/11/2020    AST 23 08/11/2020    ALT <5 08/11/2020    LABGLOM >60 08/11/2020    GFRAA >60 08/11/2020       Lab Results   Component Value Date    IRON 52 (L) 04/24/2020    TIBC 170 (L) 04/24/2020    FERRITIN 1,049 04/24/2020           Radiology-    IR KODY ART CATH ABD/PELV/LOWER EXT INIT 2ND ORDER   Final Result      Successful arteriograms of the aorta, left external iliac, left   lateral sacral, left inferior gluteal, and left internal iliac   arteries. Active extravasation was noted arising from the left lateral   sacral artery. This was embolized with Gelfoam and fibrin coated   coils. IR KODY ART CATH ABD/PELV/LOWER EXT ADDL ORDER   Final Result      Successful arteriograms of the aorta, left external iliac, left   lateral sacral, left inferior gluteal, and left internal iliac   arteries. Active extravasation was noted arising from the left lateral   sacral artery. This was embolized with Gelfoam and fibrin coated   coils. IR ANGIOGRAM EXTREMITY LEFT   Final Result      Successful arteriograms of the aorta, left external iliac, left   lateral sacral, left inferior gluteal, and left internal iliac   arteries. Active extravasation was noted arising from the left lateral   sacral artery. This was embolized with Gelfoam and fibrin coated   coils. IR ANGIOGRAM PELVIC SELECT   Final Result      Successful arteriograms of the aorta, left external iliac, left   lateral sacral, left inferior gluteal, and left internal iliac   arteries. Active extravasation was noted arising from the left lateral   sacral artery. This was embolized with Gelfoam and fibrin coated   coils. IR EMBOLIZATION HEMORRHAGE   Final Result      Successful arteriograms of the aorta, left external iliac, left   lateral sacral, left inferior gluteal, and left internal iliac   arteries. Active extravasation was noted arising from the left lateral   sacral artery. This was embolized with Gelfoam and fibrin coated   coils. CTA ABDOMEN PELVIS W CONTRAST   Final Result   No active arterial bleeding is identified. Artifact from the hip prostheses    obscures the adjacent soft tissues. A subtle area of active bleeding could be    obscured by the artifact. Left groin hematoma has increased in size prior CT. Findings could represent    venous bleeding. If indicated clinically recommend correlation with catheter    angiography. Bibasal atelectasis and pleural effusions are slightly worse. Cannot exclude a    superimposed pneumonia. This report has been electronically signed by Steve Stearns MD.      XR CHEST PORTABLE   Final Result      Right internal jugular central venous catheter tip terminates over the   superior cavoatrial junction. No evidence of pneumothorax. Pneumoperitoneum better visualized on prior CT.                  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 Pelvic hematoma expanding with dropping H+H consistent with continued bleeding into the soft tissue  - IR evaluating for potential embolization  - Continue to trend H+H and transfusion for Hgb <7 or if symptomatic  - He has developed a mild thrombocytopenia, this is due to consumption. Monitor  - With further transfusions, follow coagulation profile to monitor clotting ability and transfuse FFP as needed. PT 13.7 and INR 1.2 this AM, not requiring replacement at this time  - In depth anemia work up not warranted at this time as previously completed and patient has known active source of blood loss  -  8/10/2020  Status post IR embolization of left superficial epigastric artery. Hemoglobin stable at 7.2. Platelets 293.   Trend CBC    8/11/20  - Hgb 6.5 this AM, received another unit pRBC  - Platelets stable at 342  - Trend H+H, transfuse as above    Electronically signed by Karen Sanchez MD on 8/11/2020 at 1:27 PM

## 2020-08-11 NOTE — PLAN OF CARE
Problem: Anxiety/Stress:  Goal: Level of anxiety will decrease  Description: Level of anxiety will decrease  8/11/2020 1736 by Vero Kramer RN  Outcome: Met This Shift  8/11/2020 1021 by Vero Kramer RN  Outcome: Met This Shift     Problem: Aspiration:  Goal: Absence of aspiration  Description: Absence of aspiration  8/11/2020 1736 by Vero Kramer RN  Outcome: Met This Shift  8/11/2020 1021 by Vero Kramer RN  Outcome: Met This Shift     Problem: Falls - Risk of:  Goal: Will remain free from falls  Description: Will remain free from falls  8/11/2020 1736 by Vero Kramer RN  Outcome: Met This Shift  8/11/2020 1021 by Vero Kramer RN  Outcome: Met This Shift     Problem: Bleeding:  Goal: Will show no signs and symptoms of excessive bleeding  Description: Will show no signs and symptoms of excessive bleeding  8/11/2020 1736 by Vero Kramer RN  Outcome: Not Met This Shift  8/11/2020 1021 by Vero Kramer RN  Outcome: Not Met This Shift     Problem:  Bowel Function - Altered:  Goal: Bowel elimination is within specified parameters  Description: Bowel elimination is within specified parameters  8/11/2020 1736 by Vero Kramer RN  Outcome: Not Met This Shift  8/11/2020 1021 by Vero Kramer RN  Outcome: Not Met This Shift     Problem: Nutrition Deficit:  Goal: Ability to achieve adequate nutritional intake will improve  Description: Ability to achieve adequate nutritional intake will improve  8/11/2020 1736 by Vero Kramer RN  Outcome: Not Met This Shift  8/11/2020 1021 by Vero Kramer RN  Outcome: Met This Shift     Problem: Pain:  Goal: Pain level will decrease  Description: Pain level will decrease  8/11/2020 1736 by Vero Kramer RN  Outcome: Not Met This Shift  8/11/2020 1021 by Vero Kramer RN  Outcome: Met This Shift     Problem: Pain:  Goal: Pain level will decrease  Description: Pain level will decrease  8/11/2020 1736 by Vero Kramer RN  Outcome: Not Met This

## 2020-08-11 NOTE — PROGRESS NOTES
GENERAL SURGERY  DAILY PROGRESS NOTE    Date:2020       [id number]  Patient Name:Ricci Mahajan     YOB: 1931     Age:89 y.o. Chief Complaint:  dizziness     Subjective:  Pt reports no further blood loss from groin site after stitch placement yesterday. Is tolerating diet, passing gas and having BMs. No nausea or vomiting. Objective:  BP (!) 164/90   Pulse 101   Temp 98.6 °F (37 °C) (Bladder)   Resp 29   Ht 5' 4\" (1.626 m)   Wt 147 lb 14.4 oz (67.1 kg)   SpO2 96%   BMI 25.39 kg/m²   Temp (24hrs), Av.7 °F (37.1 °C), Min:98.4 °F (36.9 °C), Max:98.8 °F (37.1 °C)      I/O (24Hr): Intake/Output Summary (Last 24 hours) at 2020 0629  Last data filed at 2020 0600  Gross per 24 hour   Intake 2020 ml   Output 1735 ml   Net 285 ml       GENERAL:  No acute distress. Alert and interactive. LUNGS:  No cough. Nonlabored breathing on room air. CARDIOVASC:  Normal rate, no cyanosis. ABDOMEN:  Soft, non-distended, nontender. No guarding / rigidity / rebound. GROIN/: Saleh still in place. Scrotum swollen/ecchymotic similar to yesterday, R groin dressing clean and dry with no further blood loss and no hematoma. Mild groin tenderness    Assessment:  80 y.o. male with postop blood loss anemia from robotic inguinal hernia repair at [facility] 20. Plan:    - s/p IR embolization of L superficial epigastric artery   - Another stitch placed in groin site after blood loss yesterday  - Hgb Q6, transfuse prn  - appreciate ICU care, ok to transfer out of unit if H/H is stable  - Continue diet as tolerated    Electronically signed by Emil Maciel MD on 2020 at 6:29 AM     The patient was seen and examined and the chart was reviewed. I doubt that the patient has ongoing bleed loss. Monitor blood counts. ...

## 2020-08-12 ENCOUNTER — APPOINTMENT (OUTPATIENT)
Dept: ULTRASOUND IMAGING | Age: 85
DRG: 907 | End: 2020-08-12
Attending: SURGERY
Payer: MEDICARE

## 2020-08-12 ENCOUNTER — PREP FOR PROCEDURE (OUTPATIENT)
Dept: SURGERY | Age: 85
End: 2020-08-12

## 2020-08-12 LAB
ALBUMIN SERPL-MCNC: 2.7 G/DL (ref 3.5–5.2)
ALP BLD-CCNC: 55 U/L (ref 40–129)
ALT SERPL-CCNC: <5 U/L (ref 0–40)
ANION GAP SERPL CALCULATED.3IONS-SCNC: 10 MMOL/L (ref 7–16)
AST SERPL-CCNC: 24 U/L (ref 0–39)
BASOPHILS ABSOLUTE: 0 E9/L (ref 0–0.2)
BASOPHILS RELATIVE PERCENT: 0.2 % (ref 0–2)
BILIRUB SERPL-MCNC: 0.9 MG/DL (ref 0–1.2)
BUN BLDV-MCNC: 15 MG/DL (ref 8–23)
CALCIUM IONIZED: 1.35 MMOL/L (ref 1.15–1.33)
CALCIUM SERPL-MCNC: 9.3 MG/DL (ref 8.6–10.2)
CHLORIDE BLD-SCNC: 93 MMOL/L (ref 98–107)
CO2: 23 MMOL/L (ref 22–29)
CREAT SERPL-MCNC: 0.8 MG/DL (ref 0.7–1.2)
EOSINOPHILS ABSOLUTE: 0.17 E9/L (ref 0.05–0.5)
EOSINOPHILS RELATIVE PERCENT: 2.6 % (ref 0–6)
GFR AFRICAN AMERICAN: >60
GFR NON-AFRICAN AMERICAN: >60 ML/MIN/1.73
GLUCOSE BLD-MCNC: 84 MG/DL (ref 74–99)
HCT VFR BLD CALC: 23.5 % (ref 37–54)
HEMOGLOBIN: 7.5 G/DL (ref 12.5–16.5)
LYMPHOCYTES ABSOLUTE: 1.41 E9/L (ref 1.5–4)
LYMPHOCYTES RELATIVE PERCENT: 20.9 % (ref 20–42)
MAGNESIUM: 1.7 MG/DL (ref 1.6–2.6)
MCH RBC QN AUTO: 30 PG (ref 26–35)
MCHC RBC AUTO-ENTMCNC: 31.9 % (ref 32–34.5)
MCV RBC AUTO: 94 FL (ref 80–99.9)
MONOCYTES ABSOLUTE: 1.68 E9/L (ref 0.1–0.95)
MONOCYTES RELATIVE PERCENT: 25.2 % (ref 2–12)
NEUTROPHILS ABSOLUTE: 3.42 E9/L (ref 1.8–7.3)
NEUTROPHILS RELATIVE PERCENT: 51.3 % (ref 43–80)
PDW BLD-RTO: 15.1 FL (ref 11.5–15)
PHOSPHORUS: 2.9 MG/DL (ref 2.5–4.5)
PLATELET # BLD: 124 E9/L (ref 130–450)
PMV BLD AUTO: 10.4 FL (ref 7–12)
POLYCHROMASIA: ABNORMAL
POTASSIUM SERPL-SCNC: 4.2 MMOL/L (ref 3.5–5)
RBC # BLD: 2.5 E12/L (ref 3.8–5.8)
SODIUM BLD-SCNC: 126 MMOL/L (ref 132–146)
TOTAL PROTEIN: 6.1 G/DL (ref 6.4–8.3)
WBC # BLD: 6.7 E9/L (ref 4.5–11.5)

## 2020-08-12 PROCEDURE — 93926 LOWER EXTREMITY STUDY: CPT

## 2020-08-12 PROCEDURE — 97530 THERAPEUTIC ACTIVITIES: CPT

## 2020-08-12 PROCEDURE — 99233 SBSQ HOSP IP/OBS HIGH 50: CPT | Performed by: SURGERY

## 2020-08-12 PROCEDURE — 97535 SELF CARE MNGMENT TRAINING: CPT

## 2020-08-12 PROCEDURE — 2580000003 HC RX 258: Performed by: SURGERY

## 2020-08-12 PROCEDURE — 36415 COLL VENOUS BLD VENIPUNCTURE: CPT

## 2020-08-12 PROCEDURE — 85025 COMPLETE CBC W/AUTO DIFF WBC: CPT

## 2020-08-12 PROCEDURE — 2000000000 HC ICU R&B

## 2020-08-12 PROCEDURE — 6370000000 HC RX 637 (ALT 250 FOR IP): Performed by: EMERGENCY MEDICINE

## 2020-08-12 PROCEDURE — 6370000000 HC RX 637 (ALT 250 FOR IP): Performed by: STUDENT IN AN ORGANIZED HEALTH CARE EDUCATION/TRAINING PROGRAM

## 2020-08-12 PROCEDURE — 82330 ASSAY OF CALCIUM: CPT

## 2020-08-12 PROCEDURE — 6370000000 HC RX 637 (ALT 250 FOR IP): Performed by: SURGERY

## 2020-08-12 PROCEDURE — 83735 ASSAY OF MAGNESIUM: CPT

## 2020-08-12 PROCEDURE — 80053 COMPREHEN METABOLIC PANEL: CPT

## 2020-08-12 PROCEDURE — 84100 ASSAY OF PHOSPHORUS: CPT

## 2020-08-12 RX ORDER — BISACODYL 10 MG
10 SUPPOSITORY, RECTAL RECTAL DAILY
Status: DISCONTINUED | OUTPATIENT
Start: 2020-08-12 | End: 2020-08-12

## 2020-08-12 RX ORDER — BISACODYL 10 MG
20 SUPPOSITORY, RECTAL RECTAL DAILY
Status: DISCONTINUED | OUTPATIENT
Start: 2020-08-12 | End: 2020-08-13

## 2020-08-12 RX ORDER — SENNA AND DOCUSATE SODIUM 50; 8.6 MG/1; MG/1
2 TABLET, FILM COATED ORAL 2 TIMES DAILY
Status: DISCONTINUED | OUTPATIENT
Start: 2020-08-12 | End: 2020-08-14 | Stop reason: HOSPADM

## 2020-08-12 RX ORDER — POLYETHYLENE GLYCOL 3350 17 G/17G
17 POWDER, FOR SOLUTION ORAL 2 TIMES DAILY
Status: DISCONTINUED | OUTPATIENT
Start: 2020-08-12 | End: 2020-08-13

## 2020-08-12 RX ORDER — SODIUM CHLORIDE 1000 MG
1 TABLET, SOLUBLE MISCELLANEOUS
Status: DISCONTINUED | OUTPATIENT
Start: 2020-08-12 | End: 2020-08-14 | Stop reason: HOSPADM

## 2020-08-12 RX ADMIN — SPIRONOLACTONE 37.5 MG: 25 TABLET ORAL at 08:32

## 2020-08-12 RX ADMIN — DOCUSATE SODIUM 50 MG AND SENNOSIDES 8.6 MG 2 TABLET: 8.6; 5 TABLET, FILM COATED ORAL at 08:32

## 2020-08-12 RX ADMIN — LACTULOSE 10 G: 20 SOLUTION ORAL at 08:32

## 2020-08-12 RX ADMIN — ALLOPURINOL 300 MG: 300 TABLET ORAL at 08:34

## 2020-08-12 RX ADMIN — Medication 1 G: at 11:55

## 2020-08-12 RX ADMIN — PANTOPRAZOLE SODIUM 40 MG: 40 TABLET, DELAYED RELEASE ORAL at 06:27

## 2020-08-12 RX ADMIN — MAGNESIUM HYDROXIDE 30 ML: 2400 SUSPENSION ORAL at 08:40

## 2020-08-12 RX ADMIN — FUROSEMIDE 20 MG: 20 TABLET ORAL at 08:33

## 2020-08-12 RX ADMIN — FERROUS SULFATE TAB 325 MG (65 MG ELEMENTAL FE) 325 MG: 325 (65 FE) TAB at 08:33

## 2020-08-12 RX ADMIN — POLYETHYLENE GLYCOL 3350 17 G: 17 POWDER, FOR SOLUTION ORAL at 08:33

## 2020-08-12 RX ADMIN — ACETAMINOPHEN 650 MG: 325 TABLET, FILM COATED ORAL at 14:25

## 2020-08-12 RX ADMIN — POLYETHYLENE GLYCOL 3350 17 G: 17 POWDER, FOR SOLUTION ORAL at 20:48

## 2020-08-12 RX ADMIN — POTASSIUM CHLORIDE 10 MEQ: 10 TABLET, EXTENDED RELEASE ORAL at 18:13

## 2020-08-12 RX ADMIN — DOCUSATE SODIUM 50 MG AND SENNOSIDES 8.6 MG 2 TABLET: 8.6; 5 TABLET, FILM COATED ORAL at 20:48

## 2020-08-12 RX ADMIN — DOCUSATE SODIUM 100 MG: 100 CAPSULE, LIQUID FILLED ORAL at 08:33

## 2020-08-12 RX ADMIN — ACETAMINOPHEN 650 MG: 325 TABLET, FILM COATED ORAL at 01:08

## 2020-08-12 RX ADMIN — ATORVASTATIN CALCIUM 10 MG: 10 TABLET, FILM COATED ORAL at 08:32

## 2020-08-12 RX ADMIN — ACETAMINOPHEN 650 MG: 325 TABLET, FILM COATED ORAL at 20:48

## 2020-08-12 RX ADMIN — VITAMIN D 3000 UNITS: 25 TAB ORAL at 08:32

## 2020-08-12 RX ADMIN — Medication 1 G: at 18:12

## 2020-08-12 RX ADMIN — BISACODYL 20 MG: 10 SUPPOSITORY RECTAL at 14:25

## 2020-08-12 RX ADMIN — SODIUM CHLORIDE, PRESERVATIVE FREE 10 ML: 5 INJECTION INTRAVENOUS at 20:48

## 2020-08-12 RX ADMIN — POTASSIUM CHLORIDE 10 MEQ: 10 TABLET, EXTENDED RELEASE ORAL at 08:33

## 2020-08-12 RX ADMIN — SODIUM CHLORIDE, PRESERVATIVE FREE 10 ML: 5 INJECTION INTRAVENOUS at 08:34

## 2020-08-12 RX ADMIN — LATANOPROST 1 DROP: 50 SOLUTION OPHTHALMIC at 20:49

## 2020-08-12 ASSESSMENT — PAIN SCALES - GENERAL
PAINLEVEL_OUTOF10: 6
PAINLEVEL_OUTOF10: 0
PAINLEVEL_OUTOF10: 5
PAINLEVEL_OUTOF10: 5
PAINLEVEL_OUTOF10: 0
PAINLEVEL_OUTOF10: 7

## 2020-08-12 ASSESSMENT — PAIN DESCRIPTION - LOCATION: LOCATION: ABDOMEN;BACK

## 2020-08-12 ASSESSMENT — PAIN DESCRIPTION - ORIENTATION: ORIENTATION: LOWER;MID;RIGHT

## 2020-08-12 NOTE — PLAN OF CARE
Problem: Bleeding:  Goal: Will show no signs and symptoms of excessive bleeding  8/12/2020 0848 by Jerson Recio RN  Outcome: Met This Shift  8/12/2020 0257 by Izzy Pulliam RN  Outcome: Met This Shift     Problem: Discharge Planning:  Goal: Participates in care planning  Outcome: Ongoing     Problem: Aspiration:  Goal: Absence of aspiration  8/12/2020 0848 by Jerson Recio RN  Outcome: Met This Shift  8/12/2020 0257 by Izzy Pulliam RN  Outcome: Met This Shift     Problem:  Bowel Function - Altered:  Goal: Bowel elimination is within specified parameters  Outcome: Ongoing     Problem: Fluid Volume - Imbalance:  Goal: Absence of imbalanced fluid volume signs and symptoms  Outcome: Met This Shift     Problem: Nutrition Deficit:  Goal: Ability to achieve adequate nutritional intake will improve  Outcome: Met This Shift     Problem: Pain:  Goal: Pain level will decrease  8/12/2020 0848 by Jerson Recio RN  Outcome: Met This Shift  8/12/2020 0257 by Izzy Pulliam RN  Outcome: Met This Shift  Goal: Recognizes and communicates pain  8/12/2020 0257 by Izzy Pulliam RN  Outcome: Met This Shift  Goal: Control of acute pain  8/12/2020 0257 by Izzy Pulliam RN  Outcome: Met This Shift     Problem: Serum Glucose Level - Abnormal:  Goal: Ability to maintain appropriate glucose levels will improve to within specified parameters  Outcome: Met This Shift     Problem: Skin Integrity - Impaired:  Goal: Will show no infection signs and symptoms  Outcome: Met This Shift  Goal: Absence of new skin breakdown  8/12/2020 0257 by Izzy Pulliam RN  Outcome: Met This Shift     Problem: Sleep Pattern Disturbance:  Goal: Appears well-rested  Outcome: Met This Shift     Problem: Tissue Perfusion, Altered:  Goal: Circulatory function within specified parameters  Outcome: Met This Shift     Problem: Falls - Risk of:  Goal: Will remain free from falls  Outcome: Met This Shift     Problem: Pain:  Goal: Pain level will

## 2020-08-12 NOTE — PROGRESS NOTES
[facility]  SURGICAL INTENSIVE CARE UNIT (SICU)  ATTENDING PHYSICIAN CRITICAL CARE PROGRESS NOTE     I have examined the patient, reviewed the record, and discussed the case with the APN/ resident. Please refer to the APN/ resident's note. I agree with the assessment and plan. I have reviewed all relevant labs and imaging data. The following summarizes my clinical findings and independent assessment. CC:  Critical care management for pelvic hematoma    Hospital Course/Overnight Events:  8/9--admitted with pelvic hematoma (underwent robotic left inguinal hernia repair on 8/5); underwent IR embolization of left superficial epigastric artery  8/10--transfused overnight  8/11--requires transfusion again this AM  8/12--no issues overnight    Pt reports feeling better--states he feels like he needs to have a BM. Awake and alert  Follows commands  Heart: Regular  Lungs: Fairly clear bilaterally  Abdomen: bowel sounds active; minimal tenderness to left lower quadrant; firm hematoma palpated; incisions healing well--questionable hernia at supra-umbilical site; groin/scrotal ecchymosis and edema of penis  Skin: Warm/dry    Patient Active Problem List    Diagnosis Date Noted    Mild protein-calorie malnutrition ([address] 08/09/2020    Postprocedural hematoma of abdominal wall 08/09/2020    Postoperative or surgical complication, initial encounter 08/08/2020    Acute renal insufficiency 08/07/2020    Hyperkalemia 08/07/2020    Hyponatremia 08/07/2020    Pelvic hematoma in male 08/06/2020    Thrombocytopenia ([facility] [address] 04/27/2020    Abnormal findings on EGD (4-: Delvis Zhao M.D.) - Gastritis w [id number] antral ulcers, LA class B esophagitis, HH 04/27/2020    Abnormal colonoscopy (4-: Delvis Zhao M.D.) - Sigmoid colitis, probably ischemic 04/27/2020    Hypokalemia 04/27/2020    Peripheral edema 04/24/2020    Arthritis 04/24/2020    Grade I diastolic dysfunction, EF 24%.  (Cardiac echo 3-) 04/24/2020    Gross hematuria 03/30/2020    Acute ischemic stroke (HCC)     Acute blood loss anemia     Syncope and collapse     Aspiration pneumonia (HCC)     Pneumonia due to organism 03/20/2020    Anemia 03/20/2020    HTN (hypertension) 03/20/2020    VALERIE (acute kidney injury) ([facility] [address] 03/20/2020    Acute hyperglycemia 03/20/2020    Community acquired pneumonia        Status post robotic assisted laparoscopic left inguinal hernia repair with mesh with postop pelvic hematoma requiring IR embolization of left superficial epigastric artery  Acute blood loss anemia--monitor H/H  Thrombocytopenia--likely consumptive--continue to monitor  Hypoalbuminemia/moderate protein calorie malnutrition--diet as tolerated  Electrolyte imbalance (hyponatremia/hypercalcemia)--correct as able; start salt tabs  PT/OT evals  Bowel regimen  Remove thompson  DVT risk--PCDs/start subQ heparin if H/H remains stable      Robi Love MD, FACS  8/12/2020  7:41 AM      NOTE: This report was transcribed using voice recognition software. Every effort was made to ensure accuracy; however, inadvertent computerized transcription errors may be present.

## 2020-08-12 NOTE — PROGRESS NOTES
GENERAL SURGERY  DAILY PROGRESS NOTE    Date:2020       [id number]  Patient Name:Ricci Mahajan     YOB: 1931     Age:89 y.o. Chief Complaint:  dizziness     Subjective:  Pt reports no further blood loss from groin site   States he \"feels good\"  Has not had BM x 1 week since hospitalization  Denies nausea, vomiting  Tolerating a diet. S/p 1U PRBC yesterday am for Hgb 6.5    Objective:  BP (!) 150/68   Pulse 77   Temp 99.7 °F (37.6 °C) (Bladder)   Resp 19   Ht 5' 4\" (1.626 m)   Wt 145 lb 6.4 oz (66 kg)   SpO2 95%   BMI 24.96 kg/m²   Temp (24hrs), Av.7 °F (37.6 °C), Min:98.4 °F (36.9 °C), Max:100.8 °F (38.2 °C)      I/O (24Hr): Intake/Output Summary (Last 24 hours) at 2020 0614  Last data filed at 2020 0400  Gross per 24 hour   Intake 1020 ml   Output 1940 ml   Net -920 ml       GENERAL:  No acute distress. Alert and interactive. LUNGS:  No cough. Nonlabored breathing on room air. CARDIOVASC:  Normal rate, no cyanosis. ABDOMEN:  Soft, mildly distended, nontender. No guarding / rigidity / rebound. Incisions are c/d/i  GROIN/: Thompson still in place. Scrotum swollen/ecchymotic similar to yesterday, R groin dressing clean and dry with no further blood loss and no hematoma. Mild groin tenderness    Assessment:  80 y.o. male with postop blood loss anemia from robotic inguinal hernia repair at [facility] 20.     Plan:  - no further clinical signs of bleeding  - s/p IR embolization of L superficial epigastric artery   - Another stitch placed in groin site after blood loss yesterday  - Monitor H/H,  Transfuse prn  - appreciate ICU care, ok to transfer out of unit from our standpoint  - Continue diet as tolerated  - suppository for constipation  - ambulate  - ok to remove thompson  - PPX:  SCDs, ambulation, IS    Electronically signed by Hector Riley DO on 2020 at 6:18 AM      Agree with the assessment and plan

## 2020-08-12 NOTE — PROGRESS NOTES
Physical Therapy  Physical Therapy Daily Treatment Note      Name: Jim Schaefer  : 3/10/1931  MRN: [id number]    Referring Provider:  Nina Croonel DO     Date of Service: 2020    Evaluating PT:  Andre Mota, PT, DPT [id number]    Room #:  2977/9766-U  Diagnosis:  Pelvic Hematoma  Precautions: [place], [place]  Procedure/Surgery:   embolization of the left superficial epigastric artery   PMHx/PSHx:  HTN  Equipment Needs:  WW/rollator - pt already owns    SUBJECTIVE:    Pt lives with wife and son in a 2 story home with 2 stairs to enter and 1 rail. Stair glide to each level. Pt ambulated with rollator and was independent PTA. OBJECTIVE:   Initial Evaluation  Date: 8/10/20 Treatment  20  Short Term/ Long Term   Goals   AM-PAC 6 Clicks /43     Was pt agreeable to Eval/treatment? Yes Yes    Does pt have pain? /10 abdominal pain Denies pain at rest     Bed Mobility  Rolling: MaxA  Supine to sit: MaxA  Sit to supine: NT  Scooting: MaxA Rolling: MaxA  Supine to sit: MaxA  Sit to supine: NT  Scooting: MaxA Mod Independent   Transfers Sit to stand: Raven from bed; ModA from chair  Stand to sit: [facility] [place]  Stand pivot: ModA with [employer] Sit to stand:ModA bed; MaxA chair/BSC  Stand to sit: ModA  Stand pivot: MaxA with [employer] Mod Independent with [employer]   Ambulation   25 feet with ModA with [employer] 10 feet x2 reps with MaxA x2 with [employer] >150 feet with Mod Independent with [employer]   Stair negotiation: ascended and descended NT  >2 steps with 1 rail Raven   ROM BUE:  Defer to OT note  BLE:  WNL     Strength BUE:  Defer to OT note  BLE:  4/5  Increase by 1/3 MMT grade   Balance Sitting EOB:  Raven  Dynamic Standing:  ModA with [employer] Sitting EOB: SBA  Dynamic standing:  MaxA x2 with [employer]  Sitting EOB:  Independent  Dynamic Standing:   Mod Independent with [employer]     Pt is A & O x 4  CAM-ICU: NT  RASS: 0  Sensation:  WNL  Edema:  None    Vitals:  Heart Rate at rest 90 bpm Heart Rate post session 106 bpm   SpO2 at rest 94% SpO2 post session 92% Blood Pressure at rest 123/76 mmHg Blood Pressure post session 134/91 mmHg     Functional Status Score-Intensive Care Unit (FSS-ICU)   Rolling 2/7   Supine to sit transfer 2/7   Unsupported sitting  5/7   Sit to stand transfers 2/7   Ambulation 1/7   Total  12/35       Therapeutic Exercises:  NA    Patient education  Pt educated on safety, up to chair as tolerated (with assistance), gait training    Patient response to education:   Pt verbalized understanding Pt demonstrated skill Pt requires further education in this area   x x x     ASSESSMENT:    Comments:  RN reported pt was stable for session. Pt was supine in bed upon arrival, agreeable to treatment session. Pt demonstrates increased ataxia today. Very poor coordination and proprioception of LE during ambulation. Upon initial STS and ambulation pt demonstrated R foot supination and adduction of RLE. Pt had a very difficult time getting feet within ORESTES and managing [facility]. With maximal assistance and cues pt able to somewhat correct but was still ambulating on the lateral side of his R foot. Pt states this is out of the norm for him. Given seated rest d/t gait mechanics deteriorating even more with distance. After sitting in chair performed STS. Pt demonstrated more ataxia than first attempt. Poor coordination and proprioception of LE and very poor [facility] management. Pt required heavy assistance to prevent fall. With assistance and cueing for [facility] management, getting feet within ORESTES, and upright posture pt was able to ambulate short distance back to bedside. Initially sat on [facility] but unsuccessful so transferred to bedside chair. Dtr present on exit. Discussed case with RN and case management. Pt would benefit from intensive PT services at discharge.      Treatment:  Patient practiced and was instructed in the following treatment:     Bed mobility training - pt given verbal and tactile cues to facilitate proper sequencing and safety during rolling and supine>sit as well as provided with physical assistance to complete task    Transfer training - pt was given verbal and tactile cues to facilitate proper hand placement, technique and safety during sit to stand and stand to sit as well as provided with physical assistance to complete task.  Gait training- pt was given verbal and tactile cues to facilitate safety, balance and use of AD during ambulation as well as provided with physical assistance to complete task.  o Excessive cueing for proper gait sequencing, maintaining appropriate ORESTES, upright posture, foot placemen    PLAN:  Pt is making slow progress towards established goals. Continue PT POC.        Time in  1106  Time out  1145    Total Treatment Time  39 minutes     CPT codes:  [] Low Complexity PT evaluation 55851  [] Moderate Complexity PT evaluation 16889  [] High Complexity PT evaluation 74948  [] PT Re-evaluation 88636  [] Gait training 83386 - minutes  [] Manual therapy 07139 - minutes  [x] Therapeutic activities 22242 39 minutes  [] Therapeutic exercises 44756 - minutes  [] Neuromuscular reeducation 35586 - minutes     Chago Maier PT, DPT  [id number]

## 2020-08-12 NOTE — PROGRESS NOTES
Blood and [address] [place] [address]                                         Hematology/Oncology        Patient Name: Sharon Millard  YOB: 1931  PCP: Merry Solis MD   Referring Provider: [address] [place] [address] / [facility] [id number]     Reason for Consultation: No chief complaint on file. Subjective: Awake, alert and feeling better. History of Present Illness: This pt is a very pleasant 81 yo male, well known to me from previous admissions and outpatient follow ups, seen for recurrent anemia of uncertain etiology, suspected GI bleeding due to large amounts of required pRBC transfusions. 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 He is now admitted with progressive weakness, fatigue and a syncopal episode at home and was found to have Hgb 6.8 in the ER. He recently underwent robotic inguinal hernia repair at [facility] on 8/5. He has undergone CT A/P on 8/6 which showed a 13 cm L pelvic hematoma and hematoma and gas in the L inguinal canal as well as a large amount of intraperitoneal free air (likely due to post-op). CTA A/P on 8/9 showed not active arterial bleeding with increased L groin hematoma. He has received 3 unit spRBCs so far, with 4th this AM. He is being evaluated by IR for pelvic angio and possible embolization.  Hgb at 01:00 this morning was 5.5, repeat s/p transfusion up to 6.9    Diagnostic Data:     Past Medical History:   Diagnosis Date    Abnormal brain MRI 03/26/2020    Episode of syncope 03/25/2020    GI bleed     FOBT negative 3/29/20    Hypertension        Patient Active Problem List    Diagnosis Date Noted    Mild protein-calorie malnutrition ([facility] 08/09/2020    Postprocedural hematoma of abdominal wall 08/09/2020    Postoperative or surgical complication, initial encounter 08/08/2020    Acute renal insufficiency 08/07/2020    Hyperkalemia 08/07/2020    Hyponatremia 08/07/2020    Pelvic hematoma in male 08/06/2020  Thrombocytopenia ([facility] 04/27/2020    Abnormal findings on EGD (4-: Lupis Lundy M.D.) - Gastritis w [id number] antral ulcers, LA class B esophagitis, HH 04/27/2020    Abnormal colonoscopy (4-: Lupis Lundy M.D.) - Sigmoid colitis, probably ischemic 04/27/2020    Hypokalemia 04/27/2020    Peripheral edema 04/24/2020    Arthritis 04/24/2020    Grade I diastolic dysfunction, EF 56%. (Cardiac echo 3-) 04/24/2020    Gross hematuria 03/30/2020    Acute ischemic stroke (HCC)     Acute blood loss anemia     Syncope and collapse     Aspiration pneumonia (HCC)     Pneumonia due to organism 03/20/2020    Anemia 03/20/2020    HTN (hypertension) 03/20/2020    VALERIE (acute kidney injury) ([facility] 03/20/2020    Acute hyperglycemia 03/20/2020    Community acquired pneumonia         Past Surgical History:   Procedure Laterality Date    BACK SURGERY      CARPAL TUNNEL RELEASE Bilateral     COLONOSCOPY N/A 4/26/2020    COLONOSCOPY DIAGNOSTIC performed by Lupis Lundy MD at [address] [place] [address]      IR EMBOLIZATION HEMORRHAGE  8/9/2020    IR EMBOLIZATION HEMORRHAGE 8/9/2020 Lucio Rodriguez MD [facility]    JOINT REPLACEMENT Bilateral     hips    UPPER GASTROINTESTINAL ENDOSCOPY N/A 4/26/2020    EGD ESOPHAGOGASTRODUODENOSCOPY performed by Lupis Lundy MD at [facility] OR       Family History  Family History   Problem Relation Age of Onset    Cancer Father     Heart Attack Brother        Social History    TOBACCO:   reports that he quit smoking about 40 years ago. His smoking use included cigarettes. He has quit using smokeless tobacco.  ETOH:   reports current alcohol use of about 2.0 standard drinks of alcohol per week. Home Medications  Prior to Admission medications    Medication Sig Start Date End Date Taking?  Authorizing Provider   allopurinol (ZYLOPRIM) 300 MG tablet Take 300 mg by mouth daily    Historical Provider, MD   spironolactone (ALDACTONE) 25 MG tablet Take 37.5 mg by mouth daily    Historical Provider, MD   LACTULOSE PO Take by mouth as needed    Historical Provider, MD   Multiple Vitamins-Minerals (PRESERVISION AREDS 2+MULTI VIT PO) Take by mouth daily    Historical Provider, MD   VITAMIN D PO Take 3,000 Units by mouth daily    Historical Provider, MD   Glucosamine-Chondroitin (GLUCOSAMINE CHONDR COMPLEX PO) Take by mouth daily    Historical Provider, MD   Saw Erin, Serenoa repens, (SAW PALMETTO PO) Take by mouth daily    Historical Provider, MD   furosemide (LASIX) 20 MG tablet Take 1 tablet by mouth daily 5/13/20   Cici Wilkes MD   pantoprazole (PROTONIX) 40 MG tablet Take 1 tablet by mouth every morning (before breakfast) 4/29/20   Lashae Spears MD   doxazosin (CARDURA) 2 MG tablet Take 1 tablet by mouth nightly 4/28/20   Lashae Spears MD   potassium chloride (KLOR-CON M) 20 MEQ extended release tablet Take 1 tablet by mouth 2 times daily (with meals)  Patient taking differently: Take 10 mEq by mouth 2 times daily (with meals)  4/28/20   Lashae Spears MD   simvastatin (ZOCOR) 20 MG tablet Take 1 tablet by mouth nightly  Patient not taking: Reported on 5/13/2020 3/31/20   Se Renteria MD   ferrous sulfate (IRON 325) 325 (65 Fe) MG tablet Take 1 tablet by mouth 2 times daily (with meals)  Patient taking differently: Take 325 mg by mouth as needed  3/31/20   Romaine Oppenheim, MD   latanoprost (XALATAN) 0.005 % ophthalmic solution Place 1 drop into the right eye nightly    Historical Provider, MD   docusate sodium (COLACE) 100 MG capsule Take 100 mg by mouth 2 times daily as needed for Constipation    Historical Provider, MD   lisinopril (PRINIVIL;ZESTRIL) 10 MG tablet Take 10 mg by mouth daily    Historical Provider, MD       Allergies  No Known Allergies    Review of Systems:    Fatigue, dizziness. SOB improved.        Objective  /72   Pulse 111   Temp 99.2 °F (37.3 °C) (Bladder)   Resp 16 Ht 5' 4\" (1.626 m)   Wt 145 lb 6.4 oz (66 kg)   SpO2 94%   BMI 24.96 kg/m²     Physical Exam:   Performance Status:  General: AAO to person, place, time, in no acute distress, pleasant   Head and neck : PERRLA, EOMI . Sclera non icteric. Oropharynx : Clear  Neck: no JVD,  no adenopathy  LYMPHATICS : No LAD  Heart: Tahcycardia, regular rhythm, no murmur  Lungs: Clear to auscultation   Extremities: No edema,no cyanosis, no clubbing. Abdomen: Soft, non-tender;no masses, no organomegaly  Skin:  No rash  Neurologic:Cranial nerves grossly intact. No focal motor or sensory deficits . Recent Laboratory Data-   Lab Results   Component Value Date    WBC 6.7 08/12/2020    HGB 7.5 (L) 08/12/2020    HCT 23.5 (L) 08/12/2020    MCV 94.0 08/12/2020     (L) 08/12/2020    LYMPHOPCT 20.9 08/12/2020    RBC 2.50 (L) 08/12/2020    MCH 30.0 08/12/2020    MCHC 31.9 (L) 08/12/2020    RDW 15.1 (H) 08/12/2020    NEUTOPHILPCT 51.3 08/12/2020    MONOPCT 25.2 (H) 08/12/2020    BASOPCT 0.2 08/12/2020    NEUTROABS 3.42 08/12/2020    LYMPHSABS 1.41 (L) 08/12/2020    MONOSABS 1.68 (H) 08/12/2020    EOSABS 0.17 08/12/2020    BASOSABS 0.00 08/12/2020       Lab Results   Component Value Date     (L) 08/12/2020    K 4.2 08/12/2020    CL 93 (L) 08/12/2020    CO2 23 08/12/2020    BUN 15 08/12/2020    CREATININE 0.8 08/12/2020    GLUCOSE 84 08/12/2020    CALCIUM 9.3 08/12/2020    PROT 6.1 (L) 08/12/2020    LABALBU 2.7 (L) 08/12/2020    BILITOT 0.9 08/12/2020    ALKPHOS 55 08/12/2020    AST 24 08/12/2020    ALT <5 08/12/2020    LABGLOM >60 08/12/2020    GFRAA >60 08/12/2020       Lab Results   Component Value Date    IRON 52 (L) 04/24/2020    TIBC 170 (L) 04/24/2020    FERRITIN 1,049 04/24/2020           Radiology-    IR KODY ART CATH ABD/PELV/LOWER EXT INIT 2ND ORDER   Final Result      Successful arteriograms of the aorta, left external iliac, left   lateral sacral, left inferior gluteal, and left internal iliac   arteries.  Active extravasation was noted arising from the left lateral   sacral artery. This was embolized with Gelfoam and fibrin coated   coils. IR KODY ART CATH ABD/PELV/LOWER EXT ADDL ORDER   Final Result      Successful arteriograms of the aorta, left external iliac, left   lateral sacral, left inferior gluteal, and left internal iliac   arteries. Active extravasation was noted arising from the left lateral   sacral artery. This was embolized with Gelfoam and fibrin coated   coils. IR ANGIOGRAM EXTREMITY LEFT   Final Result      Successful arteriograms of the aorta, left external iliac, left   lateral sacral, left inferior gluteal, and left internal iliac   arteries. Active extravasation was noted arising from the left lateral   sacral artery. This was embolized with Gelfoam and fibrin coated   coils. IR ANGIOGRAM PELVIC SELECT   Final Result      Successful arteriograms of the aorta, left external iliac, left   lateral sacral, left inferior gluteal, and left internal iliac   arteries. Active extravasation was noted arising from the left lateral   sacral artery. This was embolized with Gelfoam and fibrin coated   coils. IR EMBOLIZATION HEMORRHAGE   Final Result      Successful arteriograms of the aorta, left external iliac, left   lateral sacral, left inferior gluteal, and left internal iliac   arteries. Active extravasation was noted arising from the left lateral   sacral artery. This was embolized with Gelfoam and fibrin coated   coils. CTA ABDOMEN PELVIS W CONTRAST   Final Result   No active arterial bleeding is identified. Artifact from the hip prostheses    obscures the adjacent soft tissues. A subtle area of active bleeding could be    obscured by the artifact. Left groin hematoma has increased in size prior CT. Findings could represent    venous bleeding. If indicated clinically recommend correlation with catheter    angiography.       Bibasal atelectasis and pleural effusions are slightly worse. Cannot exclude a    superimposed pneumonia. This report has been electronically signed by Neelam Haywood MD.      XR CHEST PORTABLE   Final Result      Right internal jugular central venous catheter tip terminates over the   superior cavoatrial junction. No evidence of pneumothorax. Pneumoperitoneum better visualized on prior CT.                  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 Pelvic hematoma expanding with dropping H+H consistent with continued bleeding into the soft tissue  - IR evaluating for potential embolization  - Continue to trend H+H and transfusion for Hgb <7 or if symptomatic  - He has developed a mild thrombocytopenia, this is due to consumption. Monitor  - With further transfusions, follow coagulation profile to monitor clotting ability and transfuse FFP as needed. PT 13.7 and INR 1.2 this AM, not requiring replacement at this time  - In depth anemia work up not warranted at this time as previously completed and patient has known active source of blood loss  -  8/10/2020  Status post IR embolization of left superficial epigastric artery. Hemoglobin stable at 7.2. Platelets 190. Trend CBC    8/11/20  - Hgb 6.5 this AM, received another unit pRBC  - Platelets stable at 422  - Trend H+H, transfuse as above    8/12/2020  Hemoglobin improved to 7.5 posttransfusion. Leukocytosis resolved with a WBC count of 6.7. Platelets stable at 842.     Electronically signed by Ellen Leahy MD on 8/12/2020 at 2:21 PM

## 2020-08-12 NOTE — PROGRESS NOTES
[address] TREATMENT NOTE      Date:2020  Patient Name: Minor Malone  MRN: [id number]  : 3/10/1931  Room: [address] [place] [address]-A     Per OT Eval:    Referring Provider: Parul Almaraz DO      Evaluating OT: Mariely Watson OTR/L [id number]        AM-PAC Daily Activity Raw Score:      Recommended Adaptive Equipment: TBA: LB dressing AE if applicable (arthritic reacher)         Diagnosis: Pelvic Hematoma   *Patient underwent s/p robotic inguinal hernia repair at [facility] 2020  Reason for admission: Presents to ED after multiple syncopal episodes at home.     Surgery/Procedures:  embolization of the left superficial epigastric artery       Pertinent Medical History: HTN, syncope, arthritis      Precautions:  [place], [address] lives with his wife and son in a two-floor home (two steps to enter); patient's bedroom and bathroom are on the second floor (stair glide between main living level and second floor). Patient noted that he has exercise equipment in his basement, which he regularly uses; there is a stair glide available between the main living level and the basement.      Bathroom Setup: walk-in shower (with seat and grab bars - no handheld shower head) and elevated seat     Equipment Owned: rollators (he keeps one on each level of his home), BSC, shower chair     Prior Level of Function: Min A with LB ADLs as needed; pt reports he was able to get into the shower independently;  Asisst with IADLs. Rollator for ambulation. 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     Pain Level: pt c/o no pain at rest this session        Cognition: A&O: 4/4    Follows 1-2 step commands appropriately. Min re-direction due to [facility]               Memory: Good              Comprehension Fair              Problem solving: Fair-              Judgement/safety: Fair-                  Communication skills: WFL              Vision: macular degeneration - reports blurry vision (uses eye drops daily)                     Glasses:yes                                                        Hearing: [place] (B aids)                RASS: 0  CAM-ICU: (NT) Delirium     UE Assessment:  Hand Dominance: Right [x]? Left []?       ROM Strength STM goal: PRN   RUE  Grossly WFL; arthritic joints with impaired FMC 3+/5 4-/5      LUE Grossly WFL; arthritic joints 3+/5 4-/5         Sensation: No c/o numbness or tingling in extremities   Tone: WNL   Edema: [facility]     Functional Assessment    Initial Eval Status  Date: 8/10 Treatment Status  Date: 8/12/20 STG=LTG  5-14  days    Feeding S; set up  Supervision/set-up (in chair)                       Amber  while seated up in chair to increase activity tolerance         Grooming Mod A  Mod. A                      Amber/SBA   while seated/standing demonstrating G B UE hand function for tasks; G tolerance   UB dressing/bathing Mod A  Mod. A                       S; set up         LB dressing/bathing Dep     Max A  after instruction on use of reacher and sock aid  (seated); limited by vision and arthritic UE's  Max A w/ reacher/sock aide (limited d/t finger dexterity/UE strength)                       Min A   using AE as needed for safe reach/ energy conservation        Toileting Dep  assisted onto bed pan prior to session. Dep (simulated on BSC)                        Min A      Bed Mobility  Rolling: Max A     Supine to sit: Max A     Sit to supine: NT  Supine to sit: Max A x2  Sit to supine: NT                       Min A  in prep of ADL tasks & transfers   Functional Transfers Sit to stand: Min A from higher bed surface; Min A from chair     Stand to sit: Min A  Sit to stand: Max A x2  Stand to sit:  Max A x2                       S  sit<>stand/functional bathroom transfers using AD/DME as needed for balance and safety   Functional Mobility Mod A WW; increased assist with turns  (assist with walker)  Max A x 2 w/ ww (cueing for ww management/narrow base of support)                      SBA/S   functional/bathroom mobility using AD as needed & demonstrating G safety      Balance Sitting:     Static:  SBA    Dynamic:Min A  Standing: Mod A [employer]  Sitting:  Static: SBA  Dynamic: Min. A  Standing: Max A w/ ww S dynamic sitting balance; SBA/S dynamic standing balance  during ADL tasks & transfers   Endurance/Activity Tolerance    F tolerance with moderate activity. Pt sat EOB > 5 min with no complaints. Pt motivated for mobility.   F tolerance with light activity. Required seated rest breaks during session. G   tolerance with moderate activity/self care routine   Visual/  Perceptual Impaired: macular degeneration                                Vitals:  Heart Rate at rest 91 bpm Heart Rate post session 105 bpm   SpO2 at rest 94% SpO2 post session 93%   Blood Pressure at rest 164/82 mmHg Blood Pressure post session 139/84 mmHg         Treatment: OT services provided include: Instruction on precautions prior to bed mobility to facilitate safe transfers and ADLS; sitting/standing balance retraining ex's to improve righting reactions with postural changes during ADLS; adapted techniques/AE to increase independence and safe reach during dressing/bathing activities; functional transfer training including postural cues, hand placement/sequencing & walker safety  to assist with balance and fall prevention; breathing techniques, pacing, work simplification strategies & recommended bathroom DME reviewed for safety and energy conservation during self care tasks and activities of daily living.         Comments: OK from RN to see patient. Upon arrival, patient supine in bed; agreeable to session w/ daughter present throughout session. Pt demo F tolerance with F understanding of education/techniques. At end of session, patient left seated up in chair to increase activity tolerance. Call light within reach, all lines and tubes intact.  Pt instructed on use of call light for assistance and fall prevention. Line management and environmental modifications made prior to and end of session to ensure patient safety and to increase efficiency of session. Skilled monitoring of HR, O2 saturation, blood pressure and patient's response to activity performed throughout session. Overall, pt presents with decreased activity tolerance, dynamic balance, functional mobility limiting completion of ADLs and safety. Pt can benefit from continued skilled OT to increase safety and functional independence.      · Pt has made limited progress towards set goals.    · Continue with current plan of care    Treatment Time In: 11:05            Treatment Time Out: 11:45               Treatment Charges: Mins Units   Ther Ex  [id number]     Manual Therapy [id number]     Thera Activities 85225 23 2   ADL/Home Mgt [id number] 17 1   Neuro Re-ed [id number]     Group Therapy      Orthotic manage/training  [id number]     Non-Billable Time     Total Timed Treatment 40 [facility] [facility] #[id number]

## 2020-08-12 NOTE — CARE COORDINATION
Hgb 7.5 s/p transfusion, up 1 gm. Post op pelvic hematoma. Plan is to return home with [facility] when stable. Will need [facility] orders for CPA's ,med compliance, PT/OT.

## 2020-08-13 LAB
ALBUMIN SERPL-MCNC: 2.7 G/DL (ref 3.5–5.2)
ALP BLD-CCNC: 61 U/L (ref 40–129)
ALT SERPL-CCNC: 5 U/L (ref 0–40)
ANION GAP SERPL CALCULATED.3IONS-SCNC: 11 MMOL/L (ref 7–16)
ANION GAP SERPL CALCULATED.3IONS-SCNC: 9 MMOL/L (ref 7–16)
ANISOCYTOSIS: ABNORMAL
AST SERPL-CCNC: 24 U/L (ref 0–39)
BASOPHILIC STIPPLING: ABNORMAL
BASOPHILS ABSOLUTE: 0.01 E9/L (ref 0–0.2)
BASOPHILS RELATIVE PERCENT: 0.1 % (ref 0–2)
BILIRUB SERPL-MCNC: 0.8 MG/DL (ref 0–1.2)
BUN BLDV-MCNC: 16 MG/DL (ref 8–23)
BUN BLDV-MCNC: 17 MG/DL (ref 8–23)
CALCIUM IONIZED: 1.33 MMOL/L (ref 1.15–1.33)
CALCIUM SERPL-MCNC: 9.4 MG/DL (ref 8.6–10.2)
CALCIUM SERPL-MCNC: 9.8 MG/DL (ref 8.6–10.2)
CHLORIDE BLD-SCNC: 93 MMOL/L (ref 98–107)
CHLORIDE BLD-SCNC: 96 MMOL/L (ref 98–107)
CO2: 25 MMOL/L (ref 22–29)
CO2: 27 MMOL/L (ref 22–29)
CREAT SERPL-MCNC: 0.9 MG/DL (ref 0.7–1.2)
CREAT SERPL-MCNC: 0.9 MG/DL (ref 0.7–1.2)
EOSINOPHILS ABSOLUTE: 0.01 E9/L (ref 0.05–0.5)
EOSINOPHILS RELATIVE PERCENT: 0.1 % (ref 0–6)
GFR AFRICAN AMERICAN: >60
GFR AFRICAN AMERICAN: >60
GFR NON-AFRICAN AMERICAN: >60 ML/MIN/1.73
GFR NON-AFRICAN AMERICAN: >60 ML/MIN/1.73
GLUCOSE BLD-MCNC: 101 MG/DL (ref 74–99)
GLUCOSE BLD-MCNC: 127 MG/DL (ref 74–99)
HCT VFR BLD CALC: 24.9 % (ref 37–54)
HEMOGLOBIN: 7.7 G/DL (ref 12.5–16.5)
HYPOCHROMIA: ABNORMAL
IMMATURE GRANULOCYTES #: 0.18 E9/L
IMMATURE GRANULOCYTES %: 1.8 % (ref 0–5)
LYMPHOCYTES ABSOLUTE: 1.16 E9/L (ref 1.5–4)
LYMPHOCYTES RELATIVE PERCENT: 11.7 % (ref 20–42)
MAGNESIUM: 1.7 MG/DL (ref 1.6–2.6)
MCH RBC QN AUTO: 29.5 PG (ref 26–35)
MCHC RBC AUTO-ENTMCNC: 30.9 % (ref 32–34.5)
MCV RBC AUTO: 95.4 FL (ref 80–99.9)
MONOCYTES ABSOLUTE: 3.69 E9/L (ref 0.1–0.95)
MONOCYTES RELATIVE PERCENT: 37.2 % (ref 2–12)
NEUTROPHILS ABSOLUTE: 4.86 E9/L (ref 1.8–7.3)
NEUTROPHILS RELATIVE PERCENT: 49.1 % (ref 43–80)
OVALOCYTES: ABNORMAL
PDW BLD-RTO: 15.1 FL (ref 11.5–15)
PHOSPHORUS: 2.6 MG/DL (ref 2.5–4.5)
PLATELET # BLD: 131 E9/L (ref 130–450)
PMV BLD AUTO: 10.9 FL (ref 7–12)
POIKILOCYTES: ABNORMAL
POLYCHROMASIA: ABNORMAL
POTASSIUM SERPL-SCNC: 4.4 MMOL/L (ref 3.5–5)
POTASSIUM SERPL-SCNC: 4.6 MMOL/L (ref 3.5–5)
RBC # BLD: 2.61 E12/L (ref 3.8–5.8)
SODIUM BLD-SCNC: 129 MMOL/L (ref 132–146)
SODIUM BLD-SCNC: 132 MMOL/L (ref 132–146)
TOTAL PROTEIN: 6.4 G/DL (ref 6.4–8.3)
WBC # BLD: 9.9 E9/L (ref 4.5–11.5)

## 2020-08-13 PROCEDURE — 85025 COMPLETE CBC W/AUTO DIFF WBC: CPT

## 2020-08-13 PROCEDURE — 99233 SBSQ HOSP IP/OBS HIGH 50: CPT | Performed by: SURGERY

## 2020-08-13 PROCEDURE — 80048 BASIC METABOLIC PNL TOTAL CA: CPT

## 2020-08-13 PROCEDURE — 6370000000 HC RX 637 (ALT 250 FOR IP): Performed by: EMERGENCY MEDICINE

## 2020-08-13 PROCEDURE — 80053 COMPREHEN METABOLIC PANEL: CPT

## 2020-08-13 PROCEDURE — 6370000000 HC RX 637 (ALT 250 FOR IP): Performed by: STUDENT IN AN ORGANIZED HEALTH CARE EDUCATION/TRAINING PROGRAM

## 2020-08-13 PROCEDURE — 83735 ASSAY OF MAGNESIUM: CPT

## 2020-08-13 PROCEDURE — 36415 COLL VENOUS BLD VENIPUNCTURE: CPT

## 2020-08-13 PROCEDURE — 82330 ASSAY OF CALCIUM: CPT

## 2020-08-13 PROCEDURE — 6370000000 HC RX 637 (ALT 250 FOR IP): Performed by: SURGERY

## 2020-08-13 PROCEDURE — 1200000000 HC SEMI PRIVATE

## 2020-08-13 PROCEDURE — 6360000002 HC RX W HCPCS: Performed by: SURGERY

## 2020-08-13 PROCEDURE — 36592 COLLECT BLOOD FROM PICC: CPT

## 2020-08-13 PROCEDURE — 97530 THERAPEUTIC ACTIVITIES: CPT

## 2020-08-13 PROCEDURE — 84100 ASSAY OF PHOSPHORUS: CPT

## 2020-08-13 PROCEDURE — 2580000003 HC RX 258: Performed by: SURGERY

## 2020-08-13 PROCEDURE — 97535 SELF CARE MNGMENT TRAINING: CPT

## 2020-08-13 RX ORDER — HEPARIN SODIUM 10000 [USP'U]/ML
5000 INJECTION, SOLUTION INTRAVENOUS; SUBCUTANEOUS EVERY 8 HOURS SCHEDULED
Status: DISCONTINUED | OUTPATIENT
Start: 2020-08-13 | End: 2020-08-14 | Stop reason: HOSPADM

## 2020-08-13 RX ADMIN — PANTOPRAZOLE SODIUM 40 MG: 40 TABLET, DELAYED RELEASE ORAL at 06:01

## 2020-08-13 RX ADMIN — Medication 1 G: at 20:37

## 2020-08-13 RX ADMIN — Medication 1 G: at 08:18

## 2020-08-13 RX ADMIN — DOCUSATE SODIUM 50 MG AND SENNOSIDES 8.6 MG 2 TABLET: 8.6; 5 TABLET, FILM COATED ORAL at 08:19

## 2020-08-13 RX ADMIN — SODIUM CHLORIDE, PRESERVATIVE FREE 10 ML: 5 INJECTION INTRAVENOUS at 08:18

## 2020-08-13 RX ADMIN — SODIUM CHLORIDE, PRESERVATIVE FREE 10 ML: 5 INJECTION INTRAVENOUS at 20:38

## 2020-08-13 RX ADMIN — ATORVASTATIN CALCIUM 10 MG: 10 TABLET, FILM COATED ORAL at 08:18

## 2020-08-13 RX ADMIN — Medication 1 G: at 13:13

## 2020-08-13 RX ADMIN — DOCUSATE SODIUM 50 MG AND SENNOSIDES 8.6 MG 2 TABLET: 8.6; 5 TABLET, FILM COATED ORAL at 20:37

## 2020-08-13 RX ADMIN — HEPARIN SODIUM 5000 UNITS: 10000 INJECTION INTRAVENOUS; SUBCUTANEOUS at 14:59

## 2020-08-13 RX ADMIN — HEPARIN SODIUM 5000 UNITS: 10000 INJECTION INTRAVENOUS; SUBCUTANEOUS at 08:23

## 2020-08-13 RX ADMIN — DOXAZOSIN 2 MG: 2 TABLET ORAL at 21:32

## 2020-08-13 RX ADMIN — SPIRONOLACTONE 37.5 MG: 25 TABLET ORAL at 08:19

## 2020-08-13 RX ADMIN — VITAMIN D 3000 UNITS: 25 TAB ORAL at 08:19

## 2020-08-13 RX ADMIN — ALLOPURINOL 300 MG: 300 TABLET ORAL at 08:19

## 2020-08-13 RX ADMIN — LATANOPROST 1 DROP: 50 SOLUTION OPHTHALMIC at 20:38

## 2020-08-13 RX ADMIN — DOCUSATE SODIUM 100 MG: 100 CAPSULE, LIQUID FILLED ORAL at 20:37

## 2020-08-13 RX ADMIN — POTASSIUM CHLORIDE 10 MEQ: 10 TABLET, EXTENDED RELEASE ORAL at 18:26

## 2020-08-13 RX ADMIN — POTASSIUM CHLORIDE 10 MEQ: 10 TABLET, EXTENDED RELEASE ORAL at 08:19

## 2020-08-13 RX ADMIN — ACETAMINOPHEN 650 MG: 325 TABLET, FILM COATED ORAL at 14:22

## 2020-08-13 RX ADMIN — HEPARIN SODIUM 5000 UNITS: 10000 INJECTION INTRAVENOUS; SUBCUTANEOUS at 20:38

## 2020-08-13 ASSESSMENT — PAIN SCALES - GENERAL
PAINLEVEL_OUTOF10: 0
PAINLEVEL_OUTOF10: 2
PAINLEVEL_OUTOF10: 2
PAINLEVEL_OUTOF10: 0

## 2020-08-13 NOTE — PLAN OF CARE
Problem: Bleeding:  Goal: Will show no signs and symptoms of excessive bleeding  Description: Will show no signs and symptoms of excessive bleeding  Outcome: Met This Shift     Problem: Discharge Planning:  Goal: Participates in care planning  Description: Participates in care planning  Outcome: Met This Shift  Goal: Discharged to appropriate level of care  Description: Discharged to appropriate level of care  Outcome: Met This Shift     Problem: Anxiety/Stress:  Goal: Level of anxiety will decrease  Description: Level of anxiety will decrease  Outcome: Met This Shift     Problem: Aspiration:  Goal: Absence of aspiration  Description: Absence of aspiration  Outcome: Met This Shift     Problem: Fluid Volume - Imbalance:  Goal: Absence of imbalanced fluid volume signs and symptoms  Description: Absence of imbalanced fluid volume signs and symptoms  Outcome: Met This Shift     Problem: Nutrition Deficit:  Goal: Ability to achieve adequate nutritional intake will improve  Description: Ability to achieve adequate nutritional intake will improve  Outcome: Met This Shift     Problem: Pain:  Goal: Pain level will decrease  Description: Pain level will decrease  Outcome: Met This Shift  Goal: Recognizes and communicates pain  Description: Recognizes and communicates pain  Outcome: Met This Shift     Problem: Skin Integrity - Impaired:  Goal: Will show no infection signs and symptoms  Description: Will show no infection signs and symptoms  Outcome: Met This Shift  Goal: Absence of new skin breakdown  Description: Absence of new skin breakdown  Outcome: Met This Shift     Problem: Tissue Perfusion, Altered:  Goal: Circulatory function within specified parameters  Description: Circulatory function within specified parameters  Outcome: Met This Shift     Problem: Falls - Risk of:  Goal: Will remain free from falls  Description: Will remain free from falls  Outcome: Met This Shift  Goal: Absence of physical injury  Description:

## 2020-08-13 NOTE — PROGRESS NOTES
GENERAL SURGERY  DAILY PROGRESS NOTE    Date:2020       XNIR:[id number]  Patient Name:Ricci Mahajan     YOB: 1931     Age:89 y.o. Chief Complaint:  dizziness     Subjective:  Had BM x 2  Yesterday. Voiding without difficulty s/p thompson removal  Denies nausea, vomiting  Tolerating a diet. Has not required further blood transfusions    Objective:  BP (!) 160/84   Pulse 87   Temp 98.8 °F (37.1 °C) (Axillary)   Resp 18   Ht 5' 4\" (1.626 m)   Wt 140 lb (63.5 kg)   SpO2 94%   BMI 24.03 kg/m²   Temp (24hrs), Av.9 °F (37.2 °C), Min:98.4 °F (36.9 °C), Max:99.3 °F (37.4 °C)      I/O (24Hr): Intake/Output Summary (Last 24 hours) at 2020 0617  Last data filed at 2020 0600  Gross per 24 hour   Intake 470 ml   Output 990 ml   Net -520 ml       GENERAL:  No acute distress. Alert and interactive. LUNGS:  No cough. Nonlabored breathing on room air. CARDIOVASC:  Normal rate, no cyanosis. ABDOMEN:  Soft, non distended, appropriately tender at incision sites. No guarding / rigidity / rebound. Incisions are c/d/i  GROIN/:  Scrotum swollen/ecchymotic improving, R groin without no hematoma. Mild groin tenderness    Assessment:  80 y.o. male with postop blood loss anemia from robotic inguinal hernia repair at [address] [place] [address] 20. Plan:  - no further clinical signs of bleeding  - s/p IR embolization of L superficial epigastric artery   - Monitor H/H,  Transfuse prn. Hgb has been stable and not required further transfusions  - appreciate ICU care, ok to transfer out of unit from Primary standpoint  - Continue diet as tolerated  - ambulate  - PPX:  SCDs, ambulation, IS  - discharge planning    Electronically signed by Isaak Smith DO on 2020 at 6:17 AM    The patient was seen and examined and the chart was reviewed. I agree with the assessment and plan. The patient will be transferred out of the intensive care unit.   Likely, the patient will be discharged home or to an extended care facility based upon his physical therapy evaluation.

## 2020-08-13 NOTE — CARE COORDINATION
Wife visiting today. She states daughter informed her of discussion yesterday. PM&R consult ordered to evaluate for acute rehab. Await therapy session today to see if pt is improving. If pt does not qualify for acute rehab. Plan will be to go home with [facility] ( will need orders). If pt does return home, wife would like him to be transported by ambulance to 2nd floor of home. She has no preference for co and is aware that it will likely be private pay. Wife is agreeable. 15:00  Received message from acute rehab that dr is recommending BAR. Pt and wife decline bar. Plan will be to return home when discharged. Will need [facility] orders. [facility] following. [facility] who will transport pt home to 2nd floor when discharged. They are aware there are 12 steps to 2nd floor. Placed pt on \"will call\" list. Contact [facility] to arrange  time when discharged at [phone].

## 2020-08-13 NOTE — PROGRESS NOTES
Patient has order to insert Peripheral IV. Both attempts by this nurse were unsuccessful. Patient refusing reattempt at this time.

## 2020-08-13 NOTE — PROGRESS NOTES
reports blurry vision (uses eye drops daily)                     SURDNPF:IKJ                                                        Hearing: [place] (B aids)     UE Assessment:  Hand Dominance: Right [x]? ?  Left []? ?       ROM Strength STM goal: PRN   RUE  Grossly WFL; arthritic joints with impaired FMC 3+/5 4-/5      LUE Grossly WFL; arthritic joints 3+/5 4-/5         Sensation: No c/o numbness or tingling in extremities   Tone: WNL   Edema: WFL     Functional Assessment    Initial Eval Status  Date: 8/10 Treatment Status  Date: 8/13/20 STG=LTG  5-14  days    Feeding S; set up  Supervision/set-up (in chair)                       Amber  while seated up in chair to increase activity tolerance         Grooming Mod A  Mod A (seated in chair)                      Amber/SBA   while seated/standing demonstrating G B UE hand function for tasks; G tolerance   UB dressing/bathing Mod A  Min. A                       U; set up         LB dressing/bathing Dep     Max A  after instruction on use of reacher and sock aid  (seated); limited by vision and arthritic UE's  Max A w/ reacher/sock aide (limited d/t finger dexterity/UE strength)  Good recall of education from previous session                       Min A   using AE as needed for safe reach/ energy conservation        Toileting Dep  assisted onto bed pan prior to session. Dep                         Min A      Bed Mobility  Rolling: Max A     Supine to sit: Max A     Sit to supine: NT  Supine to sit: Mod. A  Sit to supine: Mod. A                       Min A  in prep of ADL tasks & transfers   Functional Transfers Sit to stand: Min A from higher bed surface; Min A from chair     Stand to sit: Min A  Sit to stand: Min. A  Stand to sit: Min. A                       G  sit<>stand/functional bathroom transfers using AD/DME as needed for balance and safety   Functional Mobility Mod A WW; increased assist with turns  (assist with walker)  Min.  A w/ ww (cueing for ww management/narrow base of support)                      SBA/S   functional/bathroom mobility using AD as needed & demonstrating G safety      Balance Sitting:     Static:  SBA    Dynamic:Min A  Standing: Mod A [employer]  Sitting:  Static: SBA  Dynamic: Min. A  Standing: Min A w/ ww S dynamic sitting balance; SBA/S dynamic standing balance  during ADL tasks & transfers   Endurance/Activity Tolerance    F tolerance with moderate activity.  Pt sat EOB > 5 min with no complaints. Pt motivated for mobility.   F tolerance with light activity. Required seated rest break after short ambulation in room d/t fatigue  G   tolerance with moderate activity/self care routine   Visual/  Perceptual Impaired: macular degeneration                                    Treatment: Upon arrival pt lying in bed with wife present during session and agreeable to OT session at this time in collaboration with PT. Therapist facilitated bed mobility(cueing on hand placement), functional transfers (EOB, sit<>stand; bedside chair, sit<>stand cueing on hand placement), standing tolerance tasks and functional mobility task with ww (cuing on posture, w/w management and safety). Therapist facilitated self-care retraining: UB/LB self-care tasks(re-educated pt on reacher/sock aide to don/doff socks), simulated toileting task and grooming task(washed face/hands) while educating pt on modified techniques, posture, safety and energy conservation techniques. Skilled monitoring of HR, O2 sats and pts response to treatment. O2 remained >94% on RA during ambulation. At end of session, pt lying in bed (d/t pt being transferred to general floors/RN request) with all lines and tubes intact, call light within reach. RN present and wife present. Comments: Overall pt demonstrated decreased independence and safety during completion of ADL/functional transfers/mobility tasks. Pt demonstrating fair+ understanding of education/techniques, requiring additional training / education.  Pt would benefit from continued skilled OT to increase functional independence and quality of life. · Pt has made fair progress towards set goals.    · Continue with current plan of care    Treatment Time In: 2:30            Treatment Time Out: 2:55               Treatment Charges: Mins Units   Ther Ex  [id number]     Manual Therapy [id number] [facility]     Thera Activities 63772 15 1   ADL/Home Mgt 00765 10 1   Neuro Re-ed 19912     Group Therapy      Orthotic manage/training  97230     Non-Billable Time     Total Timed Treatment 25 1948  Jeanine Vinson OTR/L, #[id number]

## 2020-08-13 NOTE — PROGRESS NOTES
Blood and [address] [place] [address]                                         Hematology/Oncology        Patient Name: Ernestine Singer  YOB: 1931  PCP: Preston Vela MD   Referring Provider: [address] [place] [id number]     Reason for Consultation: No chief complaint on file. Subjective: Awake, alert and feeling better. Denies abdominal pain    History of Present Illness: This pt is a very pleasant 81 yo male, well known to me from previous admissions and outpatient follow ups, seen for recurrent anemia of uncertain etiology, suspected GI bleeding due to large amounts of required pRBC transfusions. 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 He is now admitted with progressive weakness, fatigue and a syncopal episode at home and was found to have Hgb 6.8 in the ER. He recently underwent robotic inguinal hernia repair at [facility] on 8/5. He has undergone CT A/P on 8/6 which showed a 13 cm L pelvic hematoma and hematoma and gas in the L inguinal canal as well as a large amount of intraperitoneal free air (likely due to post-op). CTA A/P on 8/9 showed not active arterial bleeding with increased L groin hematoma. He has received 3 unit spRBCs so far, with 4th this AM. He is being evaluated by IR for pelvic angio and possible embolization.  Hgb at 01:00 this morning was 5.5, repeat s/p transfusion up to 6.9    Diagnostic Data:     Past Medical History:   Diagnosis Date    Abnormal brain MRI 03/26/2020    Episode of syncope 03/25/2020    GI bleed     FOBT negative 3/29/20    Hypertension        Patient Active Problem List    Diagnosis Date Noted    Mild protein-calorie malnutrition ([facility] [address] 08/09/2020    Postprocedural hematoma of abdominal wall 08/09/2020    Postoperative or surgical complication, initial encounter 08/08/2020    Acute renal insufficiency 08/07/2020    Hyperkalemia 08/07/2020    Hyponatremia 08/07/2020    Pelvic spironolactone (ALDACTONE) 25 MG tablet Take 37.5 mg by mouth daily    Historical Provider, MD   LACTULOSE PO Take by mouth as needed    Historical Provider, MD   Multiple Vitamins-Minerals (PRESERVISION AREDS 2+MULTI VIT PO) Take by mouth daily    Historical Provider, MD   VITAMIN D PO Take 3,000 Units by mouth daily    Historical Provider, MD   Glucosamine-Chondroitin (GLUCOSAMINE CHONDR COMPLEX PO) Take by mouth daily    Historical Provider, MD   Saw [place], [place] repens, (SAW [place]METTO PO) Take by mouth daily    Historical Provider, MD   furosemide (LASIX) 20 MG tablet Take 1 tablet by mouth daily 5/13/20   López Magaña MD   pantoprazole (PROTONIX) 40 MG tablet Take 1 tablet by mouth every morning (before breakfast) 4/29/20   Fonnie Najjar., MD   doxazosin (CARDURA) 2 MG tablet Take 1 tablet by mouth nightly 4/28/20   Fonnie Najjar., MD   potassium chloride (KLOR-CON M) 20 MEQ extended release tablet Take 1 tablet by mouth 2 times daily (with meals)  Patient taking differently: Take 10 mEq by mouth 2 times daily (with meals)  4/28/20   Fonnie Najjar., MD   simvastatin (ZOCOR) 20 MG tablet Take 1 tablet by mouth nightly  Patient not taking: Reported on 5/13/2020 3/31/20   Se Tiwari MD   ferrous sulfate (IRON 325) 325 (65 Fe) MG tablet Take 1 tablet by mouth 2 times daily (with meals)  Patient taking differently: Take 325 mg by mouth as needed  3/31/20   Arabella Aguirre MD   latanoprost (XALATAN) 0.005 % ophthalmic solution Place 1 drop into the right eye nightly    Historical Provider, MD   docusate sodium (COLACE) 100 MG capsule Take 100 mg by mouth 2 times daily as needed for Constipation    Historical Provider, MD   lisinopril (PRINIVIL;ZESTRIL) 10 MG tablet Take 10 mg by mouth daily    Historical Provider, MD       Allergies  No Known Allergies    Review of Systems:    Fatigue, dizziness. SOB improved.        Objective  BP (!) 153/75   Pulse 96   Temp 98.4 °F (36.9 °C) (Axillary)   Resp 17   Ht 5' 4\" (1.626 m)   Wt 140 lb (63.5 kg)   SpO2 94%   BMI 24.03 kg/m²     Physical Exam:   Performance Status:  General: AAO to person, place, time, in no acute distress, pleasant   Head and neck : PERRLA, EOMI . Sclera non icteric. Oropharynx : Clear  Neck: no JVD,  no adenopathy  LYMPHATICS : No LAD  Heart: Tahcycardia, regular rhythm, no murmur  Lungs: Clear to auscultation   Extremities: No edema,no cyanosis, no clubbing. Abdomen: Soft, non-tender;no masses, no organomegaly  Skin:  No rash  Neurologic:Cranial nerves grossly intact. No focal motor or sensory deficits . Recent Laboratory Data-   Lab Results   Component Value Date    WBC 9.9 08/13/2020    HGB 7.7 (L) 08/13/2020    HCT 24.9 (L) 08/13/2020    MCV 95.4 08/13/2020     08/13/2020    LYMPHOPCT 11.7 (L) 08/13/2020    RBC 2.61 (L) 08/13/2020    MCH 29.5 08/13/2020    MCHC 30.9 (L) 08/13/2020    RDW 15.1 (H) 08/13/2020    NEUTOPHILPCT 49.1 08/13/2020    MONOPCT 37.2 (H) 08/13/2020    BASOPCT 0.1 08/13/2020    NEUTROABS 4.86 08/13/2020    LYMPHSABS 1.16 (L) 08/13/2020    MONOSABS 3.69 (H) 08/13/2020    EOSABS 0.01 (L) 08/13/2020    BASOSABS 0.01 08/13/2020       Lab Results   Component Value Date     (L) 08/13/2020    K 4.4 08/13/2020    CL 93 (L) 08/13/2020    CO2 25 08/13/2020    BUN 16 08/13/2020    CREATININE 0.9 08/13/2020    GLUCOSE 101 (H) 08/13/2020    CALCIUM 9.4 08/13/2020    PROT 6.4 08/13/2020    LABALBU 2.7 (L) 08/13/2020    BILITOT 0.8 08/13/2020    ALKPHOS 61 08/13/2020    AST 24 08/13/2020    ALT 5 08/13/2020    LABGLOM >60 08/13/2020    GFRAA >60 08/13/2020       Lab Results   Component Value Date    IRON 52 (L) 04/24/2020    TIBC 170 (L) 04/24/2020    FERRITIN 1,049 04/24/2020           Radiology-    US DUP LOWER EXTREMITY RIGHT ARTERIES   Final Result   No evidence of pseudoaneurysm or fluid collection in the right groin. Patent visualized vasculature.             IR KODY ART CATH

## 2020-08-13 NOTE — PROGRESS NOTES
[facility]  SURGICAL INTENSIVE CARE UNIT (SICU)  ATTENDING PHYSICIAN CRITICAL CARE PROGRESS NOTE     I have examined the patient, reviewed the record, and discussed the case with the APN/ resident. Please refer to the APN/ resident's note. I agree with the assessment and plan. I have reviewed all relevant labs and imaging data. The following summarizes my clinical findings and independent assessment. CC:  Critical care management for pelvic hematoma    Hospital Course/Overnight Events:  8/9--admitted with pelvic hematoma (underwent robotic left inguinal hernia repair on 8/5); underwent IR embolization of left superficial epigastric artery  8/10--transfused overnight  8/11--requires transfusion again this AM  8/12--no issues overnight  8/13--nothing new overnight    Pt reports several BMs overnight. Awake and alert  Follows commands  Heart: Regular  Lungs: Fairly clear bilaterally  Abdomen: bowel sounds active; minimal tenderness to left lower quadrant; firm hematoma palpated; incisions healing well--questionable hernia vs hematoma at supra-umbilical site; groin/scrotal ecchymosis and edema of penis  Skin: Warm/dry    Patient Active Problem List    Diagnosis Date Noted    Mild protein-calorie malnutrition ([facility] [address] 08/09/2020    Postprocedural hematoma of abdominal wall 08/09/2020    Postoperative or surgical complication, initial encounter 08/08/2020    Acute renal insufficiency 08/07/2020    Hyperkalemia 08/07/2020    Hyponatremia 08/07/2020    Pelvic hematoma in male 08/06/2020    Thrombocytopenia ([facility] [address] 04/27/2020    Abnormal findings on EGD (4-: Daphney Whitney M.D.) - Gastritis w [id number] antral ulcers, LA class B esophagitis, HH 04/27/2020    Abnormal colonoscopy (4-: Daphney Whitney M.D.) - Sigmoid colitis, probably ischemic 04/27/2020    Hypokalemia 04/27/2020    Peripheral edema 04/24/2020    Arthritis 04/24/2020    Grade I diastolic dysfunction, EF 11%.

## 2020-08-13 NOTE — PLAN OF CARE
Problem: Anxiety/Stress:  Goal: Level of anxiety will decrease  Description: Level of anxiety will decrease  8/12/2020 2020 by Sussy Sanchez RN  Outcome: Met This Shift  8/12/2020 0848 by Agnieszka Ness RN  Outcome: Met This Shift     Problem: Aspiration:  Goal: Absence of aspiration  Description: Absence of aspiration  8/12/2020 2020 by Sussy Sanchez RN  Outcome: Met This Shift  8/12/2020 0848 by Agnieszka Ness RN  Outcome: Met This Shift     Problem: Pain:  Goal: Pain level will decrease  Description: Pain level will decrease  8/12/2020 2020 by Susys Sanchez RN  Outcome: Met This Shift  8/12/2020 0848 by Agnieszka Ness RN  Outcome: Met This Shift     Problem: Falls - Risk of:  Goal: Will remain free from falls  Description: Will remain free from falls  8/12/2020 2020 by Sussy Sanchez RN  Outcome: Met This Shift  8/12/2020 0848 by Agnieszka Ness RN  Outcome: Met This Shift     Problem: Pain:  Goal: Pain level will decrease  Description: Pain level will decrease  8/12/2020 2020 by Sussy Sanchez RN  Outcome: Met This Shift  8/12/2020 0848 by Agnieszka Ness RN  Outcome: Met This Shift     Problem: Bleeding:  Goal: Will show no signs and symptoms of excessive bleeding  Description: Will show no signs and symptoms of excessive bleeding  8/12/2020 2020 by Sussy Sanchez RN  Outcome: Not Met This Shift  8/12/2020 0848 by Agnieszka Ness RN  Outcome: Met This Shift     Problem:  Bowel Function - Altered:  Goal: Bowel elimination is within specified parameters  Description: Bowel elimination is within specified parameters  8/12/2020 2020 by Sussy Sanchez RN  Outcome: Not Met This Shift  8/12/2020 0848 by Agnieszka Ness RN  Outcome: Ongoing     Problem: Nutrition Deficit:  Goal: Ability to achieve adequate nutritional intake will improve  Description: Ability to achieve adequate nutritional intake will improve  8/12/2020 2020 by Sussy Sanchez RN  Outcome: Not Met This Shift  8/12/2020 0848 by Akin Hogan Pierre RN  Outcome: Met This Shift

## 2020-08-13 NOTE — PROGRESS NOTES
employed:  [] Yes [] Part Time [] Full Time [] No [] Retired  Occupation/Profession:   Prior living arrangements: [] Home  [] Assisted living  [] SNF [] Other  Lived with:  [] Alone  [] Spouse  [] Family  [] Other  Lived with:   Contact phone:   Home:   story home   entry steps  Rails:   Steps:   to 2nd floor  Rails:     Bedroom: [] 1st floor  [] 2nd floor    Bathroom:  [] 1st floor  [] 2nd floor    Prior Functional Level: Independent for:   Assistance for:   Dependent for:   Dominant hand: [] Right  [] Left    Previous Home Equipment:  [] Cane [] Grab bars [] Orthotic / prosthetic   [] Shower chair [] Tub bench  [] 3-in-1 Commode [] Long handle sponge   [] Oxygen [] Sock aide  [] Wheelchair  [] motorized wc/scooter  [] Wheelchair cushion   [] Crutches [] Long handle shoehorn  [] Reachers [] Toilet seat elevator [] Rollator  [] Walker(wheeled)   [] Walker(standard) [] Mechanical lift    [] None of the above     Has patient had 2 or more falls in the past year or any fall with injury in the past year? [] yes   [] no   []unknown    Has patient had major surgery during past 100 days prior to admission?    [] yes   [] no Type/ Date:     Surgical History:  Past Surgical History:   Procedure Laterality Date    BACK SURGERY      CARPAL TUNNEL RELEASE Bilateral     COLONOSCOPY N/A 4/26/2020    COLONOSCOPY DIAGNOSTIC performed by Kevan Espinoza MD at [facility] IR EMBOLIZATION HEMORRHAGE  8/9/2020    IR EMBOLIZATION HEMORRHAGE 8/9/2020 Sukumar Felipe MD [facility]ED[facility]    JOINT REPLACEMENT Bilateral     hips    UPPER GASTROINTESTINAL ENDOSCOPY N/A 4/26/2020    EGD ESOPHAGOGASTRODUODENOSCOPY performed by Kevan Espinoza MD at [facility] OR       Past Medical:  Past Medical History:   Diagnosis Date    Abnormal brain MRI 03/26/2020    Episode of syncope 03/25/2020    GI bleed     FOBT negative 3/29/20    Hypertension        Current Co-morbidities:  [] Alzheimer's   [] Dysphasia [] Parkinsonism  [] Amputation   [] GERD  [] Peripheral artery disease   [] Anemia      [] Encephalopathy  [] Peripheral vascular disease  [] Anxiety   [] Gangrene   [] Pneumonia  [] Aphasia   [] Gout    [] Polyneuropathy  [] Asthma   [] Heart Failure (diastolic) [] Post-polio syndrome  [] Atrial fibrillation  [] Heart Failure (left-sided) [] Pseudomonas enteritis   [] Blind    [] Heart Failure (right-sided) [] Pulmonary embolism  [] Cellulitis     [] Heart Failure (systolic) [] Renal dialysis  [] Clostridium difficile  [] Hemiparesis   [] Renal failure  [] Congestive heart failure [] Hypertension   [] Rheumatoid arthritis  [] COPD   [] Hypotension   [] Seizure disorder   [] Coronary Artery Disease [] Hypothyroidism  [] Septicemia   [] Deaf    [] Hyperlipidemia   [] Sleep apnea  [] Depression   [] Morbid obesity  [] Spinal cord injury  [] Diabetes   [] MRSA   [] Stroke  [] Diabetic nephropathy  [] Myocardial infarction  [] Tracheostomy  [] Diabetic neuropathy  [] Osteoarthritis  [] Traumatic brain injury   [] Diabetic retinopathy  [] Osteoporosis   [] Urinary tract infection  [] DVT    [] Pancytopenia  [] Vocal cord paralysis  []  Spinal stenosis   []  kidney disease [] VRE  [] Post op    []    []        Medical/Functional Conditions requiring inpatient rehabilitation:     Risk for Medical/Clinical Complications:     CLINICAL DATA:     Height :      Weight:     BMI:        Date:  Date:  Date:    temperature      pulse      respirations      Blood pressure      Pulse oximeter         ALLERGIES: Patient has no known allergies.     DIET : DIET GENERAL;    Current Lab and Diagnostic Tests:   Recent Results (from the past 24 hour(s))   CBC Auto Differential    Collection Time: 08/13/20  4:35 AM   Result Value Ref Range    WBC 9.9 4.5 - 11.5 E9/L    RBC 2.61 (L) 3.80 - 5.80 E12/L    Hemoglobin 7.7 (L) 12.5 - 16.5 g/dL    Hematocrit 24.9 (L) 37.0 - 54.0 %    MCV 95.4 80.0 - 99.9 fL    MCH 29.5 26.0 - 35.0 pg    MCHC 30.9 (L) 32.0 - 34.5 %    RDW 15.1 (H) 11.5 - 15.0 fL    Platelets 395 160 - 778 E9/L    MPV 10.9 7.0 - 12.0 fL    Neutrophils % 49.1 43.0 - 80.0 %    Immature Granulocytes % 1.8 0.0 - 5.0 %    Lymphocytes % 11.7 (L) 20.0 - 42.0 %    Monocytes % 37.2 (H) 2.0 - 12.0 %    Eosinophils % 0.1 0.0 - 6.0 %    Basophils % 0.1 0.0 - 2.0 %    Neutrophils Absolute 4.86 1.80 - 7.30 E9/L    Immature Granulocytes # 0.18 E9/L    Lymphocytes Absolute 1.16 (L) 1.50 - 4.00 E9/L    Monocytes Absolute 3.69 (H) 0.10 - 0.95 E9/L    Eosinophils Absolute 0.01 (L) 0.05 - 0.50 E9/L    Basophils Absolute 0.01 0.00 - 0.20 E9/L    Anisocytosis 1+     Polychromasia 2+     Hypochromia 1+     Poikilocytes 1+     Ovalocytes 1+     Basophilic Stippling 1+    Comprehensive metabolic panel    Collection Time: 20  4:35 AM   Result Value Ref Range    Sodium 129 (L) 132 - 146 mmol/L    Potassium 4.4 3.5 - 5.0 mmol/L    Chloride 93 (L) 98 - 107 mmol/L    CO2 25 22 - 29 mmol/L    Anion Gap 11 7 - 16 mmol/L    Glucose 101 (H) 74 - 99 mg/dL    BUN 16 8 - 23 mg/dL    CREATININE 0.9 0.7 - 1.2 mg/dL    GFR Non-African American >60 >=60 mL/min/1.73    GFR African American >60     Calcium 9.4 8.6 - 10.2 mg/dL    Total Protein 6.4 6.4 - 8.3 g/dL    Alb 2.7 (L) 3.5 - 5.2 g/dL    Total Bilirubin 0.8 0.0 - 1.2 mg/dL    Alkaline Phosphatase 61 40 - 129 U/L    ALT 5 0 - 40 U/L    AST 24 0 - 39 U/L   Magnesium    Collection Time: 20  4:35 AM   Result Value Ref Range    Magnesium 1.7 1.6 - 2.6 mg/dL   Phosphorus    Collection Time: 20  4:35 AM   Result Value Ref Range    Phosphorus 2.6 2.5 - 4.5 mg/dL   Calcium, ionized    Collection Time: 20  4:35 AM   Result Value Ref Range    Calcium, Ion 1.33 1.15 - 1.33 mmol/L     Ir Angiogram Extremity Left    Result Date: 2020  Patient MRN:  [id number] : 3/10/1931 Age: 80 years Gender: Male Order Date:  2020 11:00 AM EXAM: IR EMBOLIZATION HEMORRHAGE, IR ANGIOGRAM PELVIC SELECT, IR ANGIOGRAM EXTREMITY LEFT, IR KODY ART CATH ABD/PELV/LOWER EXT ADDL ORDER, IR KODY ART CATH ABD/PELV/LOWER EXT INIT 2ND ORDER NUMBER OF IMAGES:  9 INDICATION:  pelvic angiography possible embolization  pelvic angiography possible embolization  COMPARISON: None Procedure: After obtaining informed consent and following the routine sterile prep and drape and after administration of local anesthesia a needle was inserted into the right common femoral artery. A microwire was advanced through the needle. A 5 Grenadian access sheath was then exchanged for the needle. A working wire was then placed through the 5 [place] access sheath. The 5 Grenadian access sheath was then replaced for a short 6 [place] sheath. The sheath was connected to a saline drip. 5 Grenadian pigtail catheter was then advanced over the working wire into the distal abdominal aorta and a digital subtraction angiogram of the pelvis was performed. No active extravasation was identified. The 5 Grenadian pigtail catheter was then exchanged for a 5 [place] catheter. The left external iliac artery was then selectively catheterized. A digital subtraction angiogram was performed demonstrating no active extravasation. The 5 Grenadian catheter was then used to select the left lateral sacral artery. Active extravasation was identified. The left sacral artery was then embolized with Gelfoam and fibrin coated coils. Postembolization angiogram demonstrated no residual active extravasation. The left inferior gluteal artery was then selectively catheterize with a 5 Grenadian catheter. A digital subtraction angiogram was performed demonstrating no active extravasation. The 5 Grenadian catheter was then used to select the internal iliac artery. A digital subtraction angiogram was performed. No active extravasation was identified. An Angio-Seal device was successfully deployed at the right common femoral artery access. The patient tolerated the procedure well. There are no complications.  Time out occurred at 11:10 AM hours. Patient received 28.3 minutes of fluoroscopy. Successful arteriograms of the aorta, left external iliac, left lateral sacral, left inferior gluteal, and left internal iliac arteries. Active extravasation was noted arising from the left lateral sacral artery. This was embolized with Gelfoam and fibrin coated coils. Xr Chest Portable    Result Date: 2020  Patient MRN: [id number] : 3/10/1931 Age:  80 years Gender: Male Order Date: 2020 1:49 AM Exam: XR CHEST PORTABLE Number of Images: 1 view Indication:   Triple lumen insertion right IJ Triple lumen insertion right IJ Comparison: Radiograph 2020. FINDINGS: Cardiac and mediastinal silhouettes are unchanged. Right internal jugular central venous catheter tip terminates at the superior cavoatrial junction. Unchanged bibasilar opacities. No pneumothorax. Degenerative changes of the shoulders. Pneumoperitoneum there are visualized on prior CT. Right internal jugular central venous catheter tip terminates over the superior cavoatrial junction. No evidence of pneumothorax. Pneumoperitoneum better visualized on prior CT. Cta Abdomen Pelvis W Contrast    Result Date: 2020  PROCEDURE INFORMATION: Exam: CT Angiography Abdomen and Pelvis With Contrast Exam date and time: 2020 1:00 AM Age: 80years old Clinical indication: Other: Decreasing hemoglobin; Prior surgery; Surgery date: 3-7 days post-operative; Surgery type: Hernia repair;  Additional info: Pelvic hematoma postop, persistent anemia TECHNIQUE: Imaging protocol: Computed tomographic angiography of the abdomen and pelvis with intravenous contrast material. 3D rendering: MIP and/or 3D reconstructed images were created by the technologist. Radiation optimization: All CT scans at this facility use at least one of these dose optimization techniques: automated exposure control; mA and/or kV adjustment per patient size (includes targeted exams where dose is matched to clinical adjacent soft tissues. A subtle area of active bleeding could be obscured by the artifact. Left groin hematoma has increased in size prior CT. Findings could represent venous bleeding. If indicated clinically recommend correlation with catheter angiography. Bibasal atelectasis and pleural effusions are slightly worse. Cannot exclude a superimposed pneumonia. This report has been electronically signed by Roopa Santiago MD.    Ir Kody Art Cath Abd/pelv/lower Ext Init 2nd Order    Result Date: 2020  Patient MRN:  [id number] : 3/10/1931 Age: 80 years Gender: Male Order Date:  2020 11:00 AM EXAM: IR EMBOLIZATION HEMORRHAGE, IR ANGIOGRAM PELVIC SELECT, IR ANGIOGRAM EXTREMITY LEFT, IR KODY ART CATH ABD/PELV/LOWER EXT ADDL ORDER, IR KODY ART CATH ABD/PELV/LOWER EXT INIT 2ND ORDER NUMBER OF IMAGES:  9 INDICATION:  pelvic angiography possible embolization  pelvic angiography possible embolization  COMPARISON: None Procedure: After obtaining informed consent and following the routine sterile prep and drape and after administration of local anesthesia a needle was inserted into the right common femoral artery. A microwire was advanced through the needle. A 5 Wallisian access sheath was then exchanged for the needle. A working wire was then placed through the 5 [place] access sheath. The 5 Wallisian access sheath was then replaced for a short 6 [place] sheath. The sheath was connected to a saline drip. 5 Wallisian pigtail catheter was then advanced over the working wire into the distal abdominal aorta and a digital subtraction angiogram of the pelvis was performed. No active extravasation was identified. The 5 Wallisian pigtail catheter was then exchanged for a 5 [place] catheter. The left external iliac artery was then selectively catheterized. A digital subtraction angiogram was performed demonstrating no active extravasation. The 5 Wallisian catheter was then used to select the left lateral sacral artery.  Active extravasation was Congolese sheath. The sheath was connected to a saline drip. 5 Congolese pigtail catheter was then advanced over the working wire into the distal abdominal aorta and a digital subtraction angiogram of the pelvis was performed. No active extravasation was identified. The 5 Congolese pigtail catheter was then exchanged for a 5 [place] catheter. The left external iliac artery was then selectively catheterized. A digital subtraction angiogram was performed demonstrating no active extravasation. The 5 Congolese catheter was then used to select the left lateral sacral artery. Active extravasation was identified. The left sacral artery was then embolized with Gelfoam and fibrin coated coils. Postembolization angiogram demonstrated no residual active extravasation. The left inferior gluteal artery was then selectively catheterize with a 5 Congolese catheter. A digital subtraction angiogram was performed demonstrating no active extravasation. The 5 Congolese catheter was then used to select the internal iliac artery. A digital subtraction angiogram was performed. No active extravasation was identified. An Angio-Seal device was successfully deployed at the right common femoral artery access. The patient tolerated the procedure well. There are no complications. Time out occurred at 11:10 AM hours. Patient received 28.3 minutes of fluoroscopy. Successful arteriograms of the aorta, left external iliac, left lateral sacral, left inferior gluteal, and left internal iliac arteries. Active extravasation was noted arising from the left lateral sacral artery. This was embolized with Gelfoam and fibrin coated coils.      Ir Angiogram Pelvic Select    Result Date: 2020  Patient MRN:  [id number] : 3/10/1931 Age: 80 years Gender: Male Order Date:  2020 11:00 AM EXAM: IR EMBOLIZATION HEMORRHAGE, IR ANGIOGRAM PELVIC SELECT, IR ANGIOGRAM EXTREMITY LEFT, IR KODY ART CATH ABD/PELV/LOWER EXT ADDL ORDER, IR KODY ART CATH ABD/PELV/LOWER EXT INIT 2ND ORDER NUMBER OF IMAGES:  9 INDICATION:  pelvic angiography possible embolization  pelvic angiography possible embolization  COMPARISON: None Procedure: After obtaining informed consent and following the routine sterile prep and drape and after administration of local anesthesia a needle was inserted into the right common femoral artery. A microwire was advanced through the needle. A 5 Belgian access sheath was then exchanged for the needle. A working wire was then placed through the 5 [place] access sheath. The 5 Belgian access sheath was then replaced for a short 6 [place] sheath. The sheath was connected to a saline drip. 5 Belgian pigtail catheter was then advanced over the working wire into the distal abdominal aorta and a digital subtraction angiogram of the pelvis was performed. No active extravasation was identified. The 5 Belgian pigtail catheter was then exchanged for a 5 [place] catheter. The left external iliac artery was then selectively catheterized. A digital subtraction angiogram was performed demonstrating no active extravasation. The 5 Belgian catheter was then used to select the left lateral sacral artery. Active extravasation was identified. The left sacral artery was then embolized with Gelfoam and fibrin coated coils. Postembolization angiogram demonstrated no residual active extravasation. The left inferior gluteal artery was then selectively catheterize with a 5 Belgian catheter. A digital subtraction angiogram was performed demonstrating no active extravasation. The 5 Belgian catheter was then used to select the internal iliac artery. A digital subtraction angiogram was performed. No active extravasation was identified. An Angio-Seal device was successfully deployed at the right common femoral artery access. The patient tolerated the procedure well. There are no complications. Time out occurred at 11:10 AM hours. Patient received 28.3 minutes of fluoroscopy.      Successful arteriograms of the aorta, left catheterize with a 5 Iranian catheter. A digital subtraction angiogram was performed demonstrating no active extravasation. The 5 Iranian catheter was then used to select the internal iliac artery. A digital subtraction angiogram was performed. No active extravasation was identified. An Angio-Seal device was successfully deployed at the right common femoral artery access. The patient tolerated the procedure well. There are no complications. Time out occurred at 11:10 AM hours. Patient received 28.3 minutes of fluoroscopy. Successful arteriograms of the aorta, left external iliac, left lateral sacral, left inferior gluteal, and left internal iliac arteries. Active extravasation was noted arising from the left lateral sacral artery. This was embolized with Gelfoam and fibrin coated coils.        Additional labs or diagnostic studies needed before admission to rehabilitation unit:      Medications:   heparin (porcine)  5,000 Units Subcutaneous 3 times per day    sennosides-docusate sodium  2 tablet Oral BID    magnesium hydroxide  30 mL Oral Daily    sodium chloride  1 g Oral TID WC    allopurinol  300 mg Oral Daily    doxazosin  2 mg Oral Nightly    latanoprost  1 drop Right Eye Nightly    Vitamin D  3,000 Units Oral Daily    ferrous sulfate  325 mg Oral Daily with breakfast    sodium chloride flush  10 mL Intravenous 2 times per day    furosemide  20 mg Oral Daily    pantoprazole  40 mg Oral QAM AC    potassium chloride  10 mEq Oral BID WC    spironolactone  37.5 mg Oral Daily    atorvastatin  10 mg Oral Daily       sodium chloride flush, HYDROcodone-acetaminophen, ondansetron, docusate sodium, acetaminophen **OR** acetaminophen, ondansetron **OR** [DISCONTINUED] ondansetron, sodium chloride flush, hydrALAZINE, labetalol    SPECIAL PRECAUTIONS: [] No current precautions  [] Cardiac  [] Renal [] Sternal [] Respiratory      [] Neurological           [] Hip  [] Spinal [] Seizure  [] Aspiration  [] Isolation precautions:    [] Contact   [] Respiratory   [] Protective     [] Droplet    [] Weight Bearing precautions:         [] Non Weight Bearing :         [] Toe Touch Weight Bearing :        [] Partial Weight Bearing :         [] Weight Bearing as Tolerated :         [] Fall Risk:   [] Recent history of falls [] Falls risk level (Bai Scale):       [] Bed Alarm    [] Do not leave alone in the bathroom    [] Chair Alarm    [] Cognitive impairment      [] One to One supervision  [] Sitter / Guido Daft sitter   [] Safety enclosure bed  [] Decreased balance     SPECIAL REHABILITATION NEEDS:   [] IV Therapy: [] PRN Adapter  [] Midline  [] PICC      [] Central Line    [] TPN       [] Oxygen: [] Trach [] Bi-PAP [] CPAP  [] Nasal cannula  [] Liters:      [] Wound Care:   [] Pressure ulcers(stage and location) -    [] Wound vac   [] Wound or incision care    [] Pain Management (level of pain, meds):      [] Incontinence Bladder [] Saleh  Insertion date:    []Hemodialysis and  Frequency:   [] Incontinence Bowel    [] Last bowel movement    Substance use history: [] Yes  [] No   [] Tobacco  [] Alcohol  [] Other     [] Ethnic  [] Cultural  [] Spiritual  [] Language [] Needs  [] Other than English  [] Hearing Impaired  [] Visually Impaired  [] Speaking Impaired  [] Blind  [] Special equipment:  [] Devices/Splints  [] Type   [] Brace   [] Type  [] Bariatric bed  [] Extra wide commode  [] Extra wide wheelchair [] Extra wide walker  [] Carmelo walker  [] Carmelo wheelchair  [] Transfer lift    [] Other equipment     FUNCTIONAL STATUS PT / [place] / Kristin Mike:  FIM / EVAL Discipline Initial:  Follow Up:  Current:    Eating OT      Grooming OT      Bathing OT      Dressing Upper Extremity OT      Dressing Lower Extremity OT      Toileting OT      Toilet Transfers OT      Tub/Shower Transfers OT      Homemaking OT      Bed Mobility PT      Bed/Wheelchair Transfers PT      Locomotion Walk / Wheelchair  Device:  Distance: PT PM    Prescreen completed __________________________________ (signature of prescreener)    Date:    Time:      JUSTIFICATION FOR ADMISSION TO ACUTE REHABILITATION:          RECOMMEND LEVEL OF CARE  Recommend inpatient rehabilitation: [] Yes   [x] No  If no indicate reason:  [] Functional level too high  [] Unmotivated  [] No insurance carrier approval [] Unlikely to return to community  [] No medical necessity  [] Patient or family chose other facility  [] Too medically complex  [] Inadequate discharge plan  [] Rehabilitation bed unavailable [x] Functional level too low  [] patient or family refused ARU    If patient not accepted for IRF admission, recommended level of care:  [] [address]  [] [facility]  [x] [facility]   [] Home Care  [] Other      [] LTAC       Physician Assigned:  [] Dr. Mart Skiff         [] Dr. Tereza Ortega              [] Dr. Camille Pate [] Dr. Zainab Cotton  [] Dr. Isaac Cure:    ____________________________________________________________________  ____________________________________________________________________  ____________________________________________________________________  ____________________________________________________________________  ____________________________________________________________________      Physician Signature:_____________________________________    Print Signature:_________________________________________    Date:    Time:        PRE-SCREEN ASSESSMENT UPDATE (if not admitted within 48 hours of initial pre-screen)    Medical Update/Changes:     Functional Update/Changes:     Reviewer Signature:_____________________________________    Date:   Time:     PHYSICIAN ADMISSION DETERMINATION AND REVIEW UPDATE: ____________________________________________________________________  ____________________________________________________________________  ____________________________________________________________________  ____________________________________________________________________  ____________________________________________________________________    Physician Signature:_____________________________________    Print Signature:_________________________________________    Date:     Time:

## 2020-08-14 VITALS
OXYGEN SATURATION: 98 % | SYSTOLIC BLOOD PRESSURE: 162 MMHG | WEIGHT: 140 LBS | DIASTOLIC BLOOD PRESSURE: 77 MMHG | TEMPERATURE: 97 F | BODY MASS INDEX: 23.9 KG/M2 | RESPIRATION RATE: 16 BRPM | HEART RATE: 76 BPM | HEIGHT: 64 IN

## 2020-08-14 LAB
ALBUMIN SERPL-MCNC: 2.7 G/DL (ref 3.5–5.2)
ALP BLD-CCNC: 66 U/L (ref 40–129)
ALT SERPL-CCNC: 5 U/L (ref 0–40)
ANION GAP SERPL CALCULATED.3IONS-SCNC: 12 MMOL/L (ref 7–16)
AST SERPL-CCNC: 22 U/L (ref 0–39)
BASOPHILIC STIPPLING: ABNORMAL
BASOPHILS ABSOLUTE: 0.01 E9/L (ref 0–0.2)
BASOPHILS RELATIVE PERCENT: 0.1 % (ref 0–2)
BILIRUB SERPL-MCNC: 0.8 MG/DL (ref 0–1.2)
BUN BLDV-MCNC: 16 MG/DL (ref 8–23)
BURR CELLS: ABNORMAL
CALCIUM IONIZED: 1.35 MMOL/L (ref 1.15–1.33)
CALCIUM SERPL-MCNC: 9.4 MG/DL (ref 8.6–10.2)
CHLORIDE BLD-SCNC: 93 MMOL/L (ref 98–107)
CO2: 23 MMOL/L (ref 22–29)
CREAT SERPL-MCNC: 0.9 MG/DL (ref 0.7–1.2)
EOSINOPHILS ABSOLUTE: 0 E9/L (ref 0.05–0.5)
EOSINOPHILS RELATIVE PERCENT: 0 % (ref 0–6)
GFR AFRICAN AMERICAN: >60
GFR NON-AFRICAN AMERICAN: >60 ML/MIN/1.73
GLUCOSE BLD-MCNC: 98 MG/DL (ref 74–99)
HCT VFR BLD CALC: 26 % (ref 37–54)
HEMOGLOBIN: 8.2 G/DL (ref 12.5–16.5)
IMMATURE GRANULOCYTES #: 0.25 E9/L
IMMATURE GRANULOCYTES %: 3.2 % (ref 0–5)
LYMPHOCYTES ABSOLUTE: 1.3 E9/L (ref 1.5–4)
LYMPHOCYTES RELATIVE PERCENT: 16.8 % (ref 20–42)
MAGNESIUM: 1.7 MG/DL (ref 1.6–2.6)
MCH RBC QN AUTO: 30.6 PG (ref 26–35)
MCHC RBC AUTO-ENTMCNC: 31.5 % (ref 32–34.5)
MCV RBC AUTO: 97 FL (ref 80–99.9)
MONOCYTES ABSOLUTE: 2.43 E9/L (ref 0.1–0.95)
MONOCYTES RELATIVE PERCENT: 31.4 % (ref 2–12)
NEUTROPHILS ABSOLUTE: 3.76 E9/L (ref 1.8–7.3)
NEUTROPHILS RELATIVE PERCENT: 48.5 % (ref 43–80)
PDW BLD-RTO: 15.2 FL (ref 11.5–15)
PHOSPHORUS: 2.5 MG/DL (ref 2.5–4.5)
PLATELET # BLD: 139 E9/L (ref 130–450)
PMV BLD AUTO: 11.1 FL (ref 7–12)
POIKILOCYTES: ABNORMAL
POLYCHROMASIA: ABNORMAL
POTASSIUM SERPL-SCNC: 4.3 MMOL/L (ref 3.5–5)
RBC # BLD: 2.68 E12/L (ref 3.8–5.8)
SODIUM BLD-SCNC: 128 MMOL/L (ref 132–146)
TOTAL PROTEIN: 6.6 G/DL (ref 6.4–8.3)
WBC # BLD: 7.8 E9/L (ref 4.5–11.5)

## 2020-08-14 PROCEDURE — 97530 THERAPEUTIC ACTIVITIES: CPT

## 2020-08-14 PROCEDURE — 83735 ASSAY OF MAGNESIUM: CPT

## 2020-08-14 PROCEDURE — 84100 ASSAY OF PHOSPHORUS: CPT

## 2020-08-14 PROCEDURE — 6370000000 HC RX 637 (ALT 250 FOR IP): Performed by: SURGERY

## 2020-08-14 PROCEDURE — 6360000002 HC RX W HCPCS: Performed by: SURGERY

## 2020-08-14 PROCEDURE — 97535 SELF CARE MNGMENT TRAINING: CPT

## 2020-08-14 PROCEDURE — 82330 ASSAY OF CALCIUM: CPT

## 2020-08-14 PROCEDURE — 6370000000 HC RX 637 (ALT 250 FOR IP): Performed by: EMERGENCY MEDICINE

## 2020-08-14 PROCEDURE — 6370000000 HC RX 637 (ALT 250 FOR IP): Performed by: STUDENT IN AN ORGANIZED HEALTH CARE EDUCATION/TRAINING PROGRAM

## 2020-08-14 PROCEDURE — 36415 COLL VENOUS BLD VENIPUNCTURE: CPT

## 2020-08-14 PROCEDURE — 2580000003 HC RX 258: Performed by: SURGERY

## 2020-08-14 PROCEDURE — 80053 COMPREHEN METABOLIC PANEL: CPT

## 2020-08-14 PROCEDURE — 85025 COMPLETE CBC W/AUTO DIFF WBC: CPT

## 2020-08-14 RX ORDER — HYDROCODONE BITARTRATE AND ACETAMINOPHEN 10; 325 MG/1; MG/1
1 TABLET ORAL EVERY 6 HOURS PRN
Qty: 28 TABLET | Refills: 0 | Status: SHIPPED | OUTPATIENT
Start: 2020-08-14 | End: 2020-08-21

## 2020-08-14 RX ORDER — SODIUM CHLORIDE 1000 MG
1 TABLET, SOLUBLE MISCELLANEOUS
Qty: 90 TABLET | Refills: 0 | Status: SHIPPED | OUTPATIENT
Start: 2020-08-14

## 2020-08-14 RX ORDER — PSEUDOEPHEDRINE HCL 30 MG
100 TABLET ORAL DAILY
Qty: 30 CAPSULE | Refills: 0 | Status: SHIPPED | OUTPATIENT
Start: 2020-08-14 | End: 2020-09-13

## 2020-08-14 RX ADMIN — ALLOPURINOL 300 MG: 300 TABLET ORAL at 08:17

## 2020-08-14 RX ADMIN — FERROUS SULFATE TAB 325 MG (65 MG ELEMENTAL FE) 325 MG: 325 (65 FE) TAB at 08:15

## 2020-08-14 RX ADMIN — HEPARIN SODIUM 5000 UNITS: 10000 INJECTION INTRAVENOUS; SUBCUTANEOUS at 05:09

## 2020-08-14 RX ADMIN — ACETAMINOPHEN 650 MG: 325 TABLET, FILM COATED ORAL at 08:15

## 2020-08-14 RX ADMIN — PANTOPRAZOLE SODIUM 40 MG: 40 TABLET, DELAYED RELEASE ORAL at 05:09

## 2020-08-14 RX ADMIN — POTASSIUM CHLORIDE 10 MEQ: 10 TABLET, EXTENDED RELEASE ORAL at 08:15

## 2020-08-14 RX ADMIN — Medication 1 G: at 08:16

## 2020-08-14 RX ADMIN — VITAMIN D 3000 UNITS: 25 TAB ORAL at 08:17

## 2020-08-14 RX ADMIN — SODIUM CHLORIDE, PRESERVATIVE FREE 10 ML: 5 INJECTION INTRAVENOUS at 08:18

## 2020-08-14 ASSESSMENT — PAIN DESCRIPTION - PAIN TYPE: TYPE: CHRONIC PAIN

## 2020-08-14 ASSESSMENT — PAIN SCALES - GENERAL
PAINLEVEL_OUTOF10: 3
PAINLEVEL_OUTOF10: 1

## 2020-08-14 ASSESSMENT — PAIN DESCRIPTION - LOCATION: LOCATION: GENERALIZED

## 2020-08-14 NOTE — PROGRESS NOTES
GENERAL SURGERY  DAILY PROGRESS NOTE    Date:2020       APKW:[id number]  Patient Name:Ricci Mahajan     YOB: 1931     Age:89 y.o. Chief Complaint:  dizziness     Subjective:  Pt seen and examined this morning. States he is feeling good and ready to go home. Denies any nausea, vomiting or abdominal pain. Has been passing flatus but did not have a BM yesterday. Objective:  BP (!) 144/77   Pulse 77   Temp 98.5 °F (36.9 °C) (Temporal)   Resp 18   Ht 5' 4\" (1.626 m)   Wt 140 lb (63.5 kg)   SpO2 94%   BMI 24.03 kg/m²   Temp (24hrs), Av.6 °F (37 °C), Min:98.4 °F (36.9 °C), Max:99.2 °F (37.3 °C)      I/O (24Hr): Intake/Output Summary (Last 24 hours) at 2020 0705  Last data filed at 2020 2342  Gross per 24 hour   Intake 260 ml   Output 325 ml   Net -65 ml       GENERAL:  No acute distress. Alert and interactive. LUNGS:  No cough. Nonlabored breathing on room air. CARDIOVASC:  Normal rate, no cyanosis. ABDOMEN:  Soft, non distended, appropriately tender at incision sites. No guarding / rigidity / rebound. Incisions are c/d/i  GROIN/:  Scrotum swollen/ecchymotic improving, R groin without hematoma. Mild groin tenderness still present    Assessment:  80 y.o. male with postop blood loss anemia from robotic inguinal hernia repair at [facility] 20.     Plan:  - no further clinical signs of bleeding - hgb stable at 8.2 this morning  - Continue diet as tolerated  - ambulate  - PPX:  SCDs, ambulation, IS  - discharge planning -  To home with home health care    Electronically signed by Martha Brody MD on 2020 at 7:05 AM

## 2020-08-14 NOTE — PROGRESS NOTES
Occupational Therapy  OT BEDSIDE TREATMENT NOTE      Date:2020  Patient Name: Romi Min  MRN: [id number]  : 3/10/1931  [facility]     Per OT Eval:    Referring Honey Crowe DO      Evaluating OT: Skipper Speed, OTR/L 4065        AM-PAC Daily Activity Raw Score:      Recommended Adaptive Equipment: TBA: LB dressing AE if applicable (arthritic reacher)         Diagnosis: Pelvic Hematoma   *Patient underwent s/p robotic inguinal hernia repair at [facility] 2020  Reason for admission: Presents to ED after multiple syncopal episodes at home.     Surgery/Procedures:  embolization of the left superficial epigastric artery       Pertinent Medical History: HTN, syncope, arthritis      Precautions:  Falls, [address] [place] [address] Street lives with his wife and son in a two-floor home (two steps to enter); patient's bedroom and bathroom are on the second floor (stair glide between main living level and second floor). Patient noted that he has exercise equipment in his basement, which he regularly uses; there is a stair glide available between the main living level and the basement.      Bathroom Setup: walk-in shower (with seat and grab bars - no handheld shower head) and elevated seat     Equipment Owned: rollators (he keeps one on each level of his home), BSC, shower chair     Prior Level of Function: Min A with LB ADLs as needed; pt reports he was able to get into the shower independently;  Asisst with IADLs.  Rollator for ambulation. 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     Pain Level: pt c/o no pain at rest this session        Cognition: A&O: 4/4    Follows 1-2 step commands appropriately. Min re-direction due to [address].               Memory: Good              Comprehension Fair              Problem solving: Fair-              Judgement/safety: Fair-                  Communication skills: WFL              Vision: macular degeneration - reports blurry vision (uses eye drops daily)                     Glasses:yes                                                        Hearing: [place] (B aids)        Functional Assessment    Initial Eval Status  Date: 8/10 Treatment Status  Date: 8/14/20 STG=LTG  5-14  days    Feeding S; set up  Set up                       Amber  while seated up in chair to increase activity tolerance         Grooming Mod A  Mod A  To wash hands standing                     Amber/SBA   while seated/standing demonstrating G B UE hand function for tasks; G tolerance   UB dressing/bathing Mod A  Min. A  seated                       S; set up         LB dressing/bathing Dep     Max A  after instruction on use of reacher and sock aid  (seated); limited by vision and arthritic UE's  Max A  To don/doff socks seated                       Min A   using AE as needed for safe reach/ energy conservation        Toileting Dep  assisted onto bed pan prior to session. Mod A  Mod A for clothing management. CGA for hygiene.                        Min A      Bed Mobility  Rolling: Max A     Supine to sit: Max A     Sit to supine: NT  Supine to sit: Mod. A  Educated pt on technique to increase independence.                         Min A  in prep of ADL tasks & transfers   Functional Transfers Sit to stand: Min A from higher bed surface; Min A from chair     Stand to sit: Min A  Sit to stand: Mod. A  Stand to sit: Mod. A  Cuing for hand placement                         S  sit<>stand/functional bathroom transfers using AD/DME as needed for balance and safety   Functional Mobility Mod A WW; increased assist with turns  (assist with walker)  Max A w/ ww   To bathroom and back using w/w                      SBA/S   functional/bathroom mobility using AD as needed & demonstrating G safety      Balance Sitting:     Static:  SBA    Dynamic:Min A  Standing: Mod A [employer]  Sitting:  Static: SBA  Dynamic: Min.  A  Standing: Min A w/ ww S dynamic sitting balance; SBA/S dynamic standing balance  during ADL tasks & transfers   Endurance/Activity Tolerance    F tolerance with moderate activity.  Pt sat EOB > 5 min with no complaints. Pt motivated for mobility. fair  G   tolerance with moderate activity/self care routine   Visual/  Perceptual Impaired: macular degeneration                                    Treatment: Upon arrival pt lying in bed with wife present during session. Pt educated on techniques to increase independence and safety during ADL's, bed mobility, and functional transfers. Discussed home set up with pt, giving suggestions to increase safety at discharge. At end of session, pt left seated in bedside chair with all lines and tubes intact, call light within reach. Overall pt demonstrated decreased independence and safety during completion of ADL/functional transfers/mobility tasks. Pt demonstrating fair+ understanding of education/techniques, requiring additional training / education. Pt would benefit from continued skilled OT to increase functional independence and quality of life. · Pt has made fair progress towards set goals.    · Continue with current plan of care    Treatment Time In: 10:03            Treatment Time Out: 10:30               Treatment Charges: Mins Units   Ther Ex  02724     Manual Therapy [phone]     Thera Activities 36274 17 1   ADL/Home Mgt 64914 10 1   Neuro Re-ed 48408     Group Therapy      Orthotic manage/training  27340     Non-Billable Time     Total Timed Treatment 27 [address] [place] [address]

## 2020-08-14 NOTE — DISCHARGE INSTR - COC
Continuity of Care Form    Patient Name: Sherri Rodriguez   :  3/10/1931  MRN:  [id number]    Admit date:  2020  Discharge date:    Code Status Order: DNR-CCA   Advance Directives:   [facility] Documentation     Date/Time Healthcare Directive Type of Healthcare Directive Copy in [address] Agent's Name Healthcare Agent's Phone Number    [phone]  Yes, patient has an advance directive for healthcare treatment  Living will;Durable power of  for health care; Health care treatment directive  No, copy requested from family  Healthcare power of   Humberto Bowles (Wife)  --          Admitting Physician:  Jose Mai MD  PCP: Nirav Mims Unit/Room#: 4465/4524-E  Discharging Unit Phone [phone]  Emergency Contact:   Extended Emergency Contact Information  Primary Emergency Contact: [facility]  Address: [address] [place] [address] Phone: [phone]  Mobile Phone: [phone]  Relation: Spouse  Preferred language: English   needed? No  Secondary Emergency Contact: NIA MARTINEZ  Address: [address] [place] [address] Phone: [phone]  Mobile Phone: [phone]  Relation: Child  Preferred language: English   needed?  No    Past Surgical History:  Past Surgical History:   Procedure Laterality Date    BACK SURGERY      CARPAL TUNNEL RELEASE Bilateral     COLONOSCOPY N/A 2020    COLONOSCOPY DIAGNOSTIC performed by Emiliana Queen MD at [address] [place] [address]      IR EMBOLIZATION HEMORRHAGE  2020    IR EMBOLIZATION HEMORRHAGE 2020 Benjamin White MD [facility]    JOINT REPLACEMENT Bilateral     hips    UPPER GASTROINTESTINAL ENDOSCOPY N/A 2020    EGD ESOPHAGOGASTRODUODENOSCOPY performed by Emiliana Queen MD at [address] Immunization History: There is no immunization history on file for this patient. Active Problems:  Patient Active Problem List   Diagnosis Code    Pneumonia due to organism J18.9    Anemia D64.9    HTN (hypertension) I10    VALERIE (acute kidney injury) ([facility] [address] [facility] N17.9    Acute hyperglycemia R73.9    Community acquired pneumonia J18.9    Aspiration pneumonia ([facility] J69.0    Syncope and collapse R55    Acute blood loss anemia D62    Acute ischemic stroke (HCC) I63.9    Gross hematuria R31.0    Peripheral edema R60.9    Arthritis M19.90    Grade I diastolic dysfunction, EF 85%. (Cardiac echo 3-) I51.9    Thrombocytopenia (HCC) D69.6    Abnormal findings on EGD (4-: Daphney Whitney M.D.) - Gastritis w [id number] antral ulcers, [place] class B esophagitis, HH R19.8    Abnormal colonoscopy (4-: Daphney Whitney M.D.) - Sigmoid colitis, probably ischemic R93.3    Hypokalemia E87.6    Pelvic hematoma in male N50.1    Acute renal insufficiency N28.9    Hyperkalemia E87.5    Hyponatremia E87.1    Postoperative or surgical complication, initial encounter T81. 9XXA    Mild protein-calorie malnutrition (HCC) E44.1    Postprocedural hematoma of abdominal wall M96.89       Isolation/Infection:   Isolation          No Isolation        Patient Infection Status     None to display          Nurse Assessment:  Last Vital Signs: BP (!) 162/77   Pulse 76   Temp 97 °F (36.1 °C) (Temporal)   Resp 16   Ht 5' 4\" (1.626 m)   Wt 140 lb (63.5 kg)   SpO2 98%   BMI 24.03 kg/m²     Last documented pain score (0-10 scale): Pain Level: 1  Last Weight:   Wt Readings from Last 1 Encounters:   08/13/20 140 lb (63.5 kg)     Mental Status:  oriented and alert    IV Access:  - None    Nursing Mobility/ADLs:  Walking   Independent  Transfer  Independent  Bathing  Independent  Dressing  Independent  Toileting  Independent  Feeding  Independent  Med Admin  Independent  Med Delivery   whole    Wound Care Documentation and Therapy:  Wound 04/27/20 Buttocks Right (Active)   Number of days: 109        Elimination:  Continence:   · Bowel: Yes  · Bladder: Yes  Urinary Catheter: None   Colostomy/Ileostomy/Ileal Conduit: No       Date of Last BM:   Intake/Output Summary (Last 24 hours) at 8/14/2020 0952  Last data filed at 8/13/2020 2342  Gross per 24 hour   Intake 260 ml   Output 325 ml   Net -65 ml     I/O last 3 completed shifts: In: 260 [P.O.:260]  Out: 325 [Urine:325]    Safety Concerns: At Risk for Falls    Impairments/Disabilities:      [address] AIXA Impairments/Disabilities:[id number]}    Nutrition Therapy:  Current Nutrition Therapy:   - Oral Diet:  General    Routes of Feeding: Oral  Liquids: No Restrictions  Daily Fluid Restriction: no  Last Modified Barium Swallow with Video (Video Swallowing Test): not done    Treatments at the Time of Hospital Discharge:   Respiratory Treatments: [address] [facility] 1/2 HOURS  Oxygen Therapy:  is not on home oxygen therapy.   Ventilator:    - No ventilator support    Rehab Therapies: Physical Therapy and Occupational Therapy  Weight Bearing Status/Restrictions: No weight bearing restirctions  Other Medical Equipment (for information only, NOT a DME order):  walker  Other Treatments:     Patient's personal belongings (please select all that are sent with patient):      RN SIGNATURE:  Electronically signed by Maira Carver RN on 8/14/20 at 9:55 AM EDT    CASE MANAGEMENT/SOCIAL WORK SECTION    Inpatient Status Date: ***    Readmission Risk Assessment Score:  Readmission Risk              Risk of Unplanned Readmission:        29           Discharging to Facility/ Agency   · Name:   · Address:  · Phone:  · Fax:    Dialysis Facility (if applicable)   · Name:  · Address:  · Dialysis Schedule:  · Phone:  · Fax:    / signature: {Esignature:[id number]}    PHYSICIAN SECTION    Prognosis: {Prognosis:5671733776}    Condition at Discharge: [address] Patient Condition:[id number]}    Rehab Potential (if transferring to Rehab): {Prognosis:8108473160}    Recommended Labs or Other Treatments After Discharge: ***    Physician Certification: I certify the above information and transfer of Fernie Weston  is necessary for the continuing treatment of the diagnosis listed and that he requires {Admit to Appropriate Level of Care:19263} for {GREATER/LESS:394543026} 30 days.      Update Admission H&P: {CHP DME Changes in [facility]:451660473}    PHYSICIAN SIGNATURE:  {Esignature:424585504}

## 2020-08-14 NOTE — DISCHARGE SUMMARY
Physician Discharge Summary     Patient ID:  Sherri Rodriguez  [id number]  56 y.o.  3/10/1931    Admit date: 2020    Discharge date and time: No discharge date for patient encounter. Admitting Physician: Jose Mai MD     Admission Diagnoses: Postoperative or surgical complication, initial encounter [T81. 9XXA]    Discharge Diagnoses: Active Problems:    Postoperative or surgical complication, initial encounter    Mild protein-calorie malnutrition (HCC)    Postprocedural hematoma of abdominal wall  Resolved Problems:    * No resolved hospital problems. *      Admission Condition: poor    Discharged Condition: stable    Indication for Admission: pelvic hematoma s/p robotic inguinal hernia repair    Hospital Course/Procedures/Operation/treatments:   Pt was admitted on  to [address] [place] [address] with weakness, fatigue and syncopal episodes at home on POD1 from robotic inguinal hernia repair. Was found to have pelvic hematoma and was initially managed conservatively. On  his hemoglobin dropped to 6.3 and he was transferred to [place] [address] for CTA and IR embolization of L superficial epigastric artery. He had a post operative course complicated by R groin access site bleeding and required blood transfusions and stitches for hemostasis. He was cared for in the Surgery ICU for most of his stay. On  he was transferred out of the SICU and was doing well with stable hemoglobin not requiring further transfusions. He was discharged home on  in stable condition with home health care in place.             Consults:   IP CONSULT TO HEM/ONC  IP CONSULT TO ONCOLOGY  IP CONSULT TO PHYSICAL MEDICINE REHAB  IP CONSULT TO HOME CARE NEEDS  IP CONSULT TO HOME CARE NEEDS    Significant Diagnostic Studies:   Ir Angiogram Extremity Left    Result Date: 2020  Patient MRN:  [id number] : 3/10/1931 Age: 80 years Gender: Male Order Date:  2020 11:00 AM EXAM: IR EMBOLIZATION HEMORRHAGE, are no complications. Time out occurred at 11:10 AM hours. Patient received 28.3 minutes of fluoroscopy. Successful arteriograms of the aorta, left external iliac, left lateral sacral, left inferior gluteal, and left internal iliac arteries. Active extravasation was noted arising from the left lateral sacral artery. This was embolized with Gelfoam and fibrin coated coils. Xr Chest Portable    Result Date: 2020  Patient MRN: [id number] : 3/10/1931 Age:  80 years Gender: Male Order Date: 2020 1:49 AM Exam: XR CHEST PORTABLE Number of Images: 1 view Indication:   Triple lumen insertion right IJ Triple lumen insertion right IJ Comparison: Radiograph 2020. FINDINGS: Cardiac and mediastinal silhouettes are unchanged. Right internal jugular central venous catheter tip terminates at the superior cavoatrial junction. Unchanged bibasilar opacities. No pneumothorax. Degenerative changes of the shoulders. Pneumoperitoneum there are visualized on prior CT. Right internal jugular central venous catheter tip terminates over the superior cavoatrial junction. No evidence of pneumothorax. Pneumoperitoneum better visualized on prior CT. Cta Abdomen Pelvis W Contrast    Result Date: 2020  PROCEDURE INFORMATION: Exam: CT Angiography Abdomen and Pelvis With Contrast Exam date and time: 2020 1:00 AM Age: 80years old Clinical indication: Other: Decreasing hemoglobin; Prior surgery; Surgery date: 3-7 days post-operative; Surgery type: Hernia repair;  Additional info: Pelvic hematoma postop, persistent anemia TECHNIQUE: Imaging protocol: Computed tomographic angiography of the abdomen and pelvis with intravenous contrast material. 3D rendering: MIP and/or 3D reconstructed images were created by the technologist. Radiation optimization: All CT scans at this facility use at least one of these dose optimization techniques: automated exposure control; mA and/or kV adjustment per patient size (includes targeted exams where dose is matched to clinical indication); or iterative reconstruction. Contrast material: ISOVUE 370; Contrast volume: 100 ml; Contrast route: INTRAVENOUS (IV);  COMPARISON: CT ABDOMEN PELVIS W CONTRAST 8/6/2020 8:31 AM FINDINGS: Tubes, catheters and devices: Saleh catheter is in good position with the tip in the urinary bladder. Lungs: Linear bibasal pulmonary opacities are worse. Pleural space: Small bilateral pleural fluid collections. Aorta: No aortic aneurysm. No aortic dissection. Celiac trunk and mesenteric arteries: No occlusion or significant stenosis. Renal arteries: No occlusion or significant stenosis. Right iliac arteries: No occlusion or significant stenosis. Left iliac arteries: No occlusion or significant stenosis. Other arteries: Coronary artery calcification is noted. Liver: No mass. Gallbladder and bile ducts: Vicarious excretion of contrast into the gallbladder is noted. Pancreas: Unremarkable. No mass. No ductal dilation. Spleen: Unremarkable. No splenomegaly. Adrenals: Unremarkable. No mass. Kidneys and ureters: 6 cm simple cyst of the left kidney again noted. This finding requires no followup. No intrarenal calculus or hydronephrosis. Stomach and bowel: Unremarkable. No obstruction. No mucosal thickening. Appendix: No evidence of appendicitis. Intraperitoneal space: Pneumoperitoneum unchanged. Lymph nodes: Unremarkable. No enlarged lymph nodes. Bladder: Unremarkable. No mass. Reproductive: Unremarkable as visualized. Bones/joints: Bilateral hip prostheses remain in good position. Artifact from the prostheses obscure the adjacent soft tissues. Severe multilevel degenerative disc disease with vacuum disc phenomenon. Soft tissues: Postoperative changes of the anterior abdominal wall noted. Left pelvic hematoma has increased in size and measures 8.3 x 11.0 x 9 cm. Air and fluid again noted in the left inguinal canal and surgery. No active arterial bleeding is identified. Artifact from the hip prostheses obscures the adjacent soft tissues. A subtle area of active bleeding could be obscured by the artifact. Left groin hematoma has increased in size prior CT. Findings could represent venous bleeding. If indicated clinically recommend correlation with catheter angiography. Bibasal atelectasis and pleural effusions are slightly worse. Cannot exclude a superimposed pneumonia. This report has been electronically signed by Michael Decker MD.    Ir Mar Art Cath Abd/pelv/lower Ext Init 2nd Order    Result Date: 2020  Patient MRN:  [id number] : 3/10/1931 Age: 80 years Gender: Male Order Date:  2020 11:00 AM EXAM: IR EMBOLIZATION HEMORRHAGE, IR ANGIOGRAM PELVIC SELECT, IR ANGIOGRAM EXTREMITY LEFT, IR MAR ART CATH ABD/PELV/LOWER EXT ADDL ORDER, IR MAR ART CATH ABD/PELV/LOWER EXT INIT 2ND ORDER NUMBER OF IMAGES:  9 INDICATION:  pelvic angiography possible embolization  pelvic angiography possible embolization  COMPARISON: None Procedure: After obtaining informed consent and following the routine sterile prep and drape and after administration of local anesthesia a needle was inserted into the right common femoral artery. A microwire was advanced through the needle. A 5 [place] access sheath was then exchanged for the needle. A working wire was then placed through the 5 [place] access sheath. The 5 [place] access sheath was then replaced for a short 6 [place] sheath. The sheath was connected to a saline drip. 5 [place] pigtail catheter was then advanced over the working wire into the distal abdominal aorta and a digital subtraction angiogram of the pelvis was performed. No active extravasation was identified. The 5 [place] pigtail catheter was then exchanged for a 5 [place] catheter. The left external iliac artery was then selectively catheterized. A digital subtraction angiogram was performed demonstrating no active extravasation.  The 5 [place] catheter was then used to select the left lateral sacral artery. Active extravasation was identified. The left sacral artery was then embolized with Gelfoam and fibrin coated coils. Postembolization angiogram demonstrated no residual active extravasation. The left inferior gluteal artery was then selectively catheterize with a 5 Hong Konger catheter. A digital subtraction angiogram was performed demonstrating no active extravasation. The 5 [place] catheter was then used to select the internal iliac artery. A digital subtraction angiogram was performed. No active extravasation was identified. An Angio-Seal device was successfully deployed at the right common femoral artery access. The patient tolerated the procedure well. There are no complications. Time out occurred at 11:10 AM hours. Patient received 28.3 minutes of fluoroscopy. Successful arteriograms of the aorta, left external iliac, left lateral sacral, left inferior gluteal, and left internal iliac arteries. Active extravasation was noted arising from the left lateral sacral artery. This was embolized with Gelfoam and fibrin coated coils. Ir Kody Art Cath Abd/pelv/lower Ext Addl Order    Result Date: 2020  Patient MRN:  [id number] : 3/10/1931 Age: 80 years Gender: Male Order Date:  2020 11:00 AM EXAM: IR EMBOLIZATION HEMORRHAGE, IR ANGIOGRAM PELVIC SELECT, IR ANGIOGRAM EXTREMITY LEFT, IR KODY ART CATH ABD/PELV/LOWER EXT ADDL ORDER, IR KODY ART CATH ABD/PELV/LOWER EXT INIT 2ND ORDER NUMBER OF IMAGES:  9 INDICATION:  pelvic angiography possible embolization  pelvic angiography possible embolization  COMPARISON: None Procedure: After obtaining informed consent and following the routine sterile prep and drape and after administration of local anesthesia a needle was inserted into the right common femoral artery. A microwire was advanced through the needle. A 5 Hong Konger access sheath was then exchanged for the needle. A working wire was then placed through the 5 [place] access sheath.  The 5 [place] access sheath was then replaced for a short 6 [place] sheath. The sheath was connected to a saline drip. 5 Norwegian pigtail catheter was then advanced over the working wire into the distal abdominal aorta and a digital subtraction angiogram of the pelvis was performed. No active extravasation was identified. The 5 Norwegian pigtail catheter was then exchanged for a 5 [place] catheter. The left external iliac artery was then selectively catheterized. A digital subtraction angiogram was performed demonstrating no active extravasation. The 5 Norwegian catheter was then used to select the left lateral sacral artery. Active extravasation was identified. The left sacral artery was then embolized with Gelfoam and fibrin coated coils. Postembolization angiogram demonstrated no residual active extravasation. The left inferior gluteal artery was then selectively catheterize with a 5 Norwegian catheter. A digital subtraction angiogram was performed demonstrating no active extravasation. The 5 Norwegian catheter was then used to select the internal iliac artery. A digital subtraction angiogram was performed. No active extravasation was identified. An Angio-Seal device was successfully deployed at the right common femoral artery access. The patient tolerated the procedure well. There are no complications. Time out occurred at 11:10 AM hours. Patient received 28.3 minutes of fluoroscopy. Successful arteriograms of the aorta, left external iliac, left lateral sacral, left inferior gluteal, and left internal iliac arteries. Active extravasation was noted arising from the left lateral sacral artery. This was embolized with Gelfoam and fibrin coated coils.      Ir Angiogram Pelvic Select    Result Date: 2020  Patient MRN:  [id number] : 3/10/1931 Age: 80 years Gender: Male Order Date:  2020 11:00 AM EXAM: IR EMBOLIZATION HEMORRHAGE, IR ANGIOGRAM PELVIC SELECT, IR ANGIOGRAM EXTREMITY LEFT, IR KODY ART CATH ABD/PELV/LOWER EXT ADDL ORDER, IR KODY ART CATH ABD/PELV/LOWER EXT INIT 2ND ORDER NUMBER OF IMAGES:  9 INDICATION:  pelvic angiography possible embolization  pelvic angiography possible embolization  COMPARISON: None Procedure: After obtaining informed consent and following the routine sterile prep and drape and after administration of local anesthesia a needle was inserted into the right common femoral artery. A microwire was advanced through the needle. A 5 Rwandan access sheath was then exchanged for the needle. A working wire was then placed through the 5 [place] access sheath. The 5 [place]andan access sheath was then replaced for a short 6 [place] sheath. The sheath was connected to a saline drip. 5 Rwandan pigtail catheter was then advanced over the working wire into the distal abdominal aorta and a digital subtraction angiogram of the pelvis was performed. No active extravasation was identified. The 5 Rwandan pigtail catheter was then exchanged for a 5 [place] catheter. The left external iliac artery was then selectively catheterized. A digital subtraction angiogram was performed demonstrating no active extravasation. The 5 Rwandan catheter was then used to select the left lateral sacral artery. Active extravasation was identified. The left sacral artery was then embolized with Gelfoam and fibrin coated coils. Postembolization angiogram demonstrated no residual active extravasation. The left inferior gluteal artery was then selectively catheterize with a 5 Rwandan catheter. A digital subtraction angiogram was performed demonstrating no active extravasation. The 5 [place]wandan catheter was then used to select the internal iliac artery. A digital subtraction angiogram was performed. No active extravasation was identified. An Angio-Seal device was successfully deployed at the right common femoral artery access. The patient tolerated the procedure well. There are no complications. Time out occurred at 11:10 AM hours. Patient received 28.3 minutes of fluoroscopy. Successful arteriograms of the aorta, left external iliac, left lateral sacral, left inferior gluteal, and left internal iliac arteries. Active extravasation was noted arising from the left lateral sacral artery. This was embolized with Gelfoam and fibrin coated coils. Ir Embolization Hemorrhage    Result Date: 2020  Patient MRN:  [id number] : 3/10/1931 Age: 80 years Gender: Male Order Date:  2020 11:00 AM EXAM: IR EMBOLIZATION HEMORRHAGE, IR ANGIOGRAM PELVIC SELECT, IR ANGIOGRAM EXTREMITY LEFT, IR KODY ART CATH ABD/PELV/LOWER EXT ADDL ORDER, IR KODY ART CATH ABD/PELV/LOWER EXT INIT 2ND ORDER NUMBER OF IMAGES:  9 INDICATION:  pelvic angiography possible embolization  pelvic angiography possible embolization  COMPARISON: None Procedure: After obtaining informed consent and following the routine sterile prep and drape and after administration of local anesthesia a needle was inserted into the right common femoral artery. A microwire was advanced through the needle. A 5 Malay access sheath was then exchanged for the needle. A working wire was then placed through the 5 [place] access sheath. The 5 Malay access sheath was then replaced for a short 6 [place] sheath. The sheath was connected to a saline drip. 5 Malay pigtail catheter was then advanced over the working wire into the distal abdominal aorta and a digital subtraction angiogram of the pelvis was performed. No active extravasation was identified. The 5 Malay pigtail catheter was then exchanged for a 5 [place] catheter. The left external iliac artery was then selectively catheterized. A digital subtraction angiogram was performed demonstrating no active extravasation. The 5 Malay catheter was then used to select the left lateral sacral artery. Active extravasation was identified. The left sacral artery was then embolized with Gelfoam and fibrin coated coils. Postembolization angiogram demonstrated no residual active extravasation.  The left and progress to your normal diet as you feel like eating. If you experience nausea or repeated episodes of vomiting which persist beyond 12-24 hours, notify your doctor.     · ACTIVITY: Rest today. Increase activity gradually. No driving while on prescription pain medication. No heavy lifting for 4 weeks - nothing over 10 lbs, or heavier than a milk jug.     · SHOWER/BATHING: Okay to shower. No tub bathing, swimming or soaking for 2 weeks.     · WOUND CARE: You have a clean dressing applied. You may remove this dressing in 24 hours. You do not need a new dressing but may apply one if you feel that your incisions are oozing. Avoid directly applying lotions, creams or oils to your incision. Keep incisions clean and dry. Always ensure you and your care giver clean hands before and after caring for the wound. You may place ice on incisions to decrease the pain and bruising.     · MEDICATIONS: Take as prescribed. You may take over the counter ibuprofen or tylenol for pain as directed, limit total amount of acetaminophen (tylenol) to 3 grams per 24-hour period.  You may experience constipation while taking pain medication, you may take over the counter stool softeners (Docusate/Colace or Senokot-S) as directed.         SPECIAL INSTRUCTIONS:   Call physician if they or any other problems occur:  · Call the office if you have a fever over >101F, or if your incision becomes red, tender, or drains more than a small amount of clear fluid  · Fever over 101°                  · Redness, swelling, hardness or warmth at the operative site  · Unrelieved nausea                          · Foul smelling or cloudy drainage at the operative site       · Unrelieved pain                   · Blood soaked dressing (Some oozing may be normal)    Follow up:   MD James Pitts 39.  Pradip Garcia [address] [facility]  [address] [facility] MD Ruy  [address] [place] [address] [address] [place] [address]

## 2020-08-14 NOTE — CARE COORDINATION
Transportation arranged with [facility]ician[facility] ambulance for 12:30. [facility] notified of discharge. Nursing and pt's wife notified of the time.  Sky Faustin RN [phone]

## 2020-08-31 NOTE — PROGRESS NOTES
Physician Progress Note      Reji Vann  CSN #:                  [id number]  :                       3/10/1931  ADMIT DATE:       2020 8:03 AM  DISCH DATE:        2020 12:35 AM  RESPONDING  PROVIDER #:        Micheal Sales MD          QUERY TEXT:    Dear Surgical Attending/Resident,    Pt admitted with \". ..pelvic hematoma s/p robotic inguinal hernia repair   . Vamsi Simmons \" and was noted to have HGB 6.8 on admission. If possible, please   document in progress notes and discharge summary:    The medical record reflects the following:  Risk Factors: recent hernia repair  Clinical Indicators: per H&P, \". ..s/p robotic inguinal hernia repair at   [facility]  presents for evaluation of weakness, fatigue, and syncopal   episodes at home. .. Hgb 6.8 (baseline 8-9) with a CT scan showing 13cm pelvic   hematoma. Vamsi Simmons \"; per IM progress note, \". Vamsi Simmons Acute blood loss anemia - s/p   laparoscopic L  inguinal hernia repair 2020. Vamsi Simmons Currently being transfused   w 2 units PRBC's. Vamsi Simmons Pelvic hematoma. .. Vamsi Simmons \";  Treatment: multiple PRBC transfusion; lab monitoring; admission to   intermediate care  Options provided:  -- Pelvic hematoma as an postoperative complication  -- Pelvic hematoma as an expected/inherent condition that occurred   postoperatively and not a complication  -- Pelvic hematoma related to other incidental risk factor (please specify)   occurring following surgery and not a complication  -- Other - I will add my own diagnosis  -- Disagree - Not applicable / Not valid  -- Disagree - Clinically unable to determine / Unknown  -- Refer to Clinical Documentation Reviewer    PROVIDER RESPONSE TEXT:    Patient has Pelvic hematoma as a postoperative complication.     Query created by: Yani Quinteros on 2020 7:52 AM      Electronically signed by:  Micheal Sales MD 2020 7:51 PM

## 2020-09-09 NOTE — DISCHARGE SUMMARY
Surgery Discharge Summary    [address] SUMMARY:                The patient is a 80 y.o. male who was admitted to the hospital on 8/6/2020  8:03 AM for treatment of postoperative hematoma. The patient went a robotic assisted laparoscopic left inguinal hernioplasty with mesh. The patient developed a hematoma. The patient continued to have ongoing blood loss. Subsequently, it was decided the patient would be transferred to the [facility] for further evaluation including possible embolization. The patient was discharged on 8/9/2020 12:35 AM to the [facility] for a higher level of care. The patient was discharged to [facility]. In satisfactory condition with instructions to call for a follow up appointment. Hospital Problem List:  Active Problems:    HTN (hypertension)    VALERIE (acute kidney injury) ([facility] [address]    Syncope and collapse    Acute blood loss anemia    Pelvic hematoma in male    Hyperkalemia    Hyponatremia  Resolved Problems:    * No resolved hospital problems.  *         Discharge Medications:    Arnette Soulier   Home Medication Instructions ASR:[id number]    Printed on:09/09/20 1658   Medication Information                      allopurinol (ZYLOPRIM) 300 MG tablet  Take 300 mg by mouth daily             doxazosin (CARDURA) 2 MG tablet  Take 1 tablet by mouth nightly             ferrous sulfate (IRON 325) 325 (65 Fe) MG tablet  Take 1 tablet by mouth 2 times daily (with meals)             furosemide (LASIX) 20 MG tablet  Take 1 tablet by mouth daily             Glucosamine-Chondroitin (GLUCOSAMINE CHONDR COMPLEX PO)  Take by mouth daily             LACTULOSE PO  Take by mouth as needed             latanoprost (XALATAN) 0.005 % ophthalmic solution  Place 1 drop into the right eye nightly             lisinopril (PRINIVIL;ZESTRIL) 10 MG tablet  Take 10 mg by mouth daily             Multiple Vitamins-Minerals (PRESERVISION AREDS 2+MULTI VIT PO)  Take by mouth daily             pantoprazole (PROTONIX) 40 MG tablet  Take 1 tablet by mouth every morning (before breakfast)             potassium chloride (KLOR-CON M) 20 MEQ extended release tablet  Take 1 tablet by mouth 2 times daily (with meals)             Carin Zapata, (SAW PALMETTO PO)  Take by mouth daily             simvastatin (ZOCOR) 20 MG tablet  Take 1 tablet by mouth nightly             spironolactone (ALDACTONE) 25 MG tablet  Take 37.5 mg by mouth daily             VITAMIN D PO  Take 3,000 Units by mouth daily                 Adina Hardy  9/9/2020

## 2020-09-22 NOTE — ED NOTES
Bed: 16  Expected date:   Expected time:   Means of arrival:   Comments:  dennis Henley RN  08/06/20 3145 English

## 2025-02-24 ENCOUNTER — APPOINTMENT (OUTPATIENT)
Dept: GENERAL RADIOLOGY | Age: 89
DRG: 534 | End: 2025-02-24
Payer: MEDICARE

## 2025-02-24 ENCOUNTER — APPOINTMENT (OUTPATIENT)
Dept: CT IMAGING | Age: 89
DRG: 534 | End: 2025-02-24
Payer: MEDICARE

## 2025-02-24 ENCOUNTER — HOSPITAL ENCOUNTER (INPATIENT)
Age: 89
LOS: 3 days | Discharge: NURSING, REHAB OR LONG-TERM CARE | DRG: 534 | End: 2025-02-27
Attending: EMERGENCY MEDICINE | Admitting: STUDENT IN AN ORGANIZED HEALTH CARE EDUCATION/TRAINING PROGRAM
Payer: MEDICARE

## 2025-02-24 DIAGNOSIS — R79.89 ELEVATED SERUM CREATININE: ICD-10-CM

## 2025-02-24 DIAGNOSIS — S72.002A CLOSED FRACTURE OF LEFT HIP, INITIAL ENCOUNTER (HCC): Primary | ICD-10-CM

## 2025-02-24 PROBLEM — D64.9 CHRONIC ANEMIA: Chronic | Status: ACTIVE | Noted: 2020-03-20

## 2025-02-24 PROBLEM — I10 ESSENTIAL HYPERTENSION: Chronic | Status: ACTIVE | Noted: 2020-03-20

## 2025-02-24 PROBLEM — I50.30 (HFPEF) HEART FAILURE WITH PRESERVED EJECTION FRACTION (HCC): Chronic | Status: ACTIVE | Noted: 2025-02-24

## 2025-02-24 PROBLEM — E78.5 HLD (HYPERLIPIDEMIA): Chronic | Status: ACTIVE | Noted: 2025-02-24

## 2025-02-24 LAB
ALBUMIN SERPL-MCNC: 4 G/DL (ref 3.5–5.2)
ALP SERPL-CCNC: 71 U/L (ref 40–129)
ALT SERPL-CCNC: 12 U/L (ref 0–40)
ANION GAP SERPL CALCULATED.3IONS-SCNC: 11 MMOL/L (ref 7–16)
AST SERPL-CCNC: 19 U/L (ref 0–39)
BASOPHILS # BLD: 0 K/UL (ref 0–0.2)
BASOPHILS NFR BLD: 0 % (ref 0–2)
BILIRUB SERPL-MCNC: 0.4 MG/DL (ref 0–1.2)
BNP SERPL-MCNC: 4010 PG/ML (ref 0–450)
BUN SERPL-MCNC: 28 MG/DL (ref 6–23)
CALCIUM SERPL-MCNC: 9.5 MG/DL (ref 8.6–10.2)
CHLORIDE SERPL-SCNC: 101 MMOL/L (ref 98–107)
CO2 SERPL-SCNC: 23 MMOL/L (ref 22–29)
CREAT SERPL-MCNC: 1.4 MG/DL (ref 0.7–1.2)
EKG ATRIAL RATE: 85 BPM
EKG P AXIS: 23 DEGREES
EKG P-R INTERVAL: 216 MS
EKG Q-T INTERVAL: 386 MS
EKG QRS DURATION: 72 MS
EKG QTC CALCULATION (BAZETT): 459 MS
EKG R AXIS: 21 DEGREES
EKG T AXIS: 35 DEGREES
EKG VENTRICULAR RATE: 85 BPM
EOSINOPHIL # BLD: 0.11 K/UL (ref 0.05–0.5)
EOSINOPHILS RELATIVE PERCENT: 1 % (ref 0–6)
ERYTHROCYTE [DISTWIDTH] IN BLOOD BY AUTOMATED COUNT: 15 % (ref 11.5–15)
GFR, ESTIMATED: 48 ML/MIN/1.73M2
GLUCOSE SERPL-MCNC: 108 MG/DL (ref 74–99)
HCT VFR BLD AUTO: 36 % (ref 37–54)
HGB BLD-MCNC: 10.6 G/DL (ref 12.5–16.5)
INR PPP: 1.2
LIPASE SERPL-CCNC: 123 U/L (ref 13–60)
LYMPHOCYTES NFR BLD: 1.31 K/UL (ref 1.5–4)
LYMPHOCYTES RELATIVE PERCENT: 10 % (ref 20–42)
MCH RBC QN AUTO: 28 PG (ref 26–35)
MCHC RBC AUTO-ENTMCNC: 29.4 G/DL (ref 32–34.5)
MCV RBC AUTO: 95.2 FL (ref 80–99.9)
MONOCYTES NFR BLD: 0.44 K/UL (ref 0.1–0.95)
MONOCYTES NFR BLD: 4 % (ref 2–12)
NEUTROPHILS NFR BLD: 85 % (ref 43–80)
NEUTS SEG NFR BLD: 10.74 K/UL (ref 1.8–7.3)
PARTIAL THROMBOPLASTIN TIME: 39.9 SEC (ref 24.5–35.1)
PLATELET # BLD AUTO: 380 K/UL (ref 130–450)
PMV BLD AUTO: 11.2 FL (ref 7–12)
POTASSIUM SERPL-SCNC: 3.7 MMOL/L (ref 3.5–5)
PROCALCITONIN SERPL-MCNC: 0.04 NG/ML (ref 0–0.08)
PROT SERPL-MCNC: 6.7 G/DL (ref 6.4–8.3)
PROTHROMBIN TIME: 12.7 SEC (ref 9.3–12.4)
RBC # BLD AUTO: 3.78 M/UL (ref 3.8–5.8)
RBC # BLD: ABNORMAL 10*6/UL
RBC # BLD: ABNORMAL 10*6/UL
SODIUM SERPL-SCNC: 135 MMOL/L (ref 132–146)
WBC OTHER # BLD: 12.6 K/UL (ref 4.5–11.5)

## 2025-02-24 PROCEDURE — 83880 ASSAY OF NATRIURETIC PEPTIDE: CPT

## 2025-02-24 PROCEDURE — 73030 X-RAY EXAM OF SHOULDER: CPT

## 2025-02-24 PROCEDURE — 96374 THER/PROPH/DIAG INJ IV PUSH: CPT

## 2025-02-24 PROCEDURE — 83690 ASSAY OF LIPASE: CPT

## 2025-02-24 PROCEDURE — 93010 ELECTROCARDIOGRAM REPORT: CPT | Performed by: INTERNAL MEDICINE

## 2025-02-24 PROCEDURE — 2580000003 HC RX 258: Performed by: EMERGENCY MEDICINE

## 2025-02-24 PROCEDURE — 2060000000 HC ICU INTERMEDIATE R&B

## 2025-02-24 PROCEDURE — 93005 ELECTROCARDIOGRAM TRACING: CPT | Performed by: EMERGENCY MEDICINE

## 2025-02-24 PROCEDURE — 96372 THER/PROPH/DIAG INJ SC/IM: CPT

## 2025-02-24 PROCEDURE — 71045 X-RAY EXAM CHEST 1 VIEW: CPT

## 2025-02-24 PROCEDURE — 85730 THROMBOPLASTIN TIME PARTIAL: CPT

## 2025-02-24 PROCEDURE — 73700 CT LOWER EXTREMITY W/O DYE: CPT

## 2025-02-24 PROCEDURE — 73502 X-RAY EXAM HIP UNI 2-3 VIEWS: CPT

## 2025-02-24 PROCEDURE — 6360000002 HC RX W HCPCS: Performed by: EMERGENCY MEDICINE

## 2025-02-24 PROCEDURE — 6370000000 HC RX 637 (ALT 250 FOR IP)

## 2025-02-24 PROCEDURE — 84145 PROCALCITONIN (PCT): CPT

## 2025-02-24 PROCEDURE — 6360000004 HC RX CONTRAST MEDICATION

## 2025-02-24 PROCEDURE — 85025 COMPLETE CBC W/AUTO DIFF WBC: CPT

## 2025-02-24 PROCEDURE — 99285 EMERGENCY DEPT VISIT HI MDM: CPT

## 2025-02-24 PROCEDURE — 74177 CT ABD & PELVIS W/CONTRAST: CPT

## 2025-02-24 PROCEDURE — 99222 1ST HOSP IP/OBS MODERATE 55: CPT | Performed by: STUDENT IN AN ORGANIZED HEALTH CARE EDUCATION/TRAINING PROGRAM

## 2025-02-24 PROCEDURE — 80053 COMPREHEN METABOLIC PANEL: CPT

## 2025-02-24 PROCEDURE — 85610 PROTHROMBIN TIME: CPT

## 2025-02-24 RX ORDER — SODIUM CHLORIDE 9 MG/ML
INJECTION, SOLUTION INTRAVENOUS CONTINUOUS
Status: DISCONTINUED | OUTPATIENT
Start: 2025-02-24 | End: 2025-02-24

## 2025-02-24 RX ORDER — SODIUM CHLORIDE 9 MG/ML
INJECTION, SOLUTION INTRAVENOUS CONTINUOUS
Status: DISCONTINUED | OUTPATIENT
Start: 2025-02-24 | End: 2025-02-25

## 2025-02-24 RX ORDER — MORPHINE SULFATE 2 MG/ML
2 INJECTION, SOLUTION INTRAMUSCULAR; INTRAVENOUS ONCE
Status: DISCONTINUED | OUTPATIENT
Start: 2025-02-24 | End: 2025-02-24

## 2025-02-24 RX ORDER — DOCUSATE SODIUM 100 MG/1
100 CAPSULE, LIQUID FILLED ORAL DAILY
COMMUNITY

## 2025-02-24 RX ORDER — SODIUM CHLORIDE 0.9 % (FLUSH) 0.9 %
5-40 SYRINGE (ML) INJECTION EVERY 12 HOURS SCHEDULED
Status: DISCONTINUED | OUTPATIENT
Start: 2025-02-24 | End: 2025-02-27 | Stop reason: HOSPADM

## 2025-02-24 RX ORDER — ACETAMINOPHEN 650 MG/1
650 SUPPOSITORY RECTAL EVERY 6 HOURS PRN
Status: DISCONTINUED | OUTPATIENT
Start: 2025-02-24 | End: 2025-02-27 | Stop reason: HOSPADM

## 2025-02-24 RX ORDER — ATORVASTATIN CALCIUM 10 MG/1
10 TABLET, FILM COATED ORAL DAILY
Status: DISCONTINUED | OUTPATIENT
Start: 2025-02-25 | End: 2025-02-27 | Stop reason: HOSPADM

## 2025-02-24 RX ORDER — ACETAMINOPHEN 325 MG/1
650 TABLET ORAL EVERY 6 HOURS PRN
Status: DISCONTINUED | OUTPATIENT
Start: 2025-02-24 | End: 2025-02-27 | Stop reason: HOSPADM

## 2025-02-24 RX ORDER — HYDROCODONE BITARTRATE AND ACETAMINOPHEN 5; 325 MG/1; MG/1
1 TABLET ORAL EVERY 6 HOURS PRN
Status: DISCONTINUED | OUTPATIENT
Start: 2025-02-24 | End: 2025-02-27 | Stop reason: HOSPADM

## 2025-02-24 RX ORDER — POTASSIUM CHLORIDE 1500 MG/1
40 TABLET, EXTENDED RELEASE ORAL PRN
Status: DISCONTINUED | OUTPATIENT
Start: 2025-02-24 | End: 2025-02-24

## 2025-02-24 RX ORDER — DORZOLAMIDE HYDROCHLORIDE AND TIMOLOL MALEATE 20; 5 MG/ML; MG/ML
1 SOLUTION/ DROPS OPHTHALMIC 2 TIMES DAILY
COMMUNITY
Start: 2025-01-31

## 2025-02-24 RX ORDER — ONDANSETRON 2 MG/ML
4 INJECTION INTRAMUSCULAR; INTRAVENOUS EVERY 6 HOURS PRN
Status: DISCONTINUED | OUTPATIENT
Start: 2025-02-24 | End: 2025-02-27 | Stop reason: HOSPADM

## 2025-02-24 RX ORDER — ONDANSETRON 4 MG/1
4 TABLET, ORALLY DISINTEGRATING ORAL EVERY 8 HOURS PRN
Status: DISCONTINUED | OUTPATIENT
Start: 2025-02-24 | End: 2025-02-27 | Stop reason: HOSPADM

## 2025-02-24 RX ORDER — ACETAMINOPHEN 325 MG/1
650 TABLET ORAL EVERY 6 HOURS PRN
COMMUNITY

## 2025-02-24 RX ORDER — POLYETHYLENE GLYCOL 3350 17 G/17G
17 POWDER, FOR SOLUTION ORAL DAILY PRN
Status: DISCONTINUED | OUTPATIENT
Start: 2025-02-24 | End: 2025-02-27 | Stop reason: HOSPADM

## 2025-02-24 RX ORDER — ASCORBIC ACID 500 MG
500 TABLET ORAL DAILY
COMMUNITY

## 2025-02-24 RX ORDER — HYDROCODONE BITARTRATE AND ACETAMINOPHEN 5; 325 MG/1; MG/1
1 TABLET ORAL EVERY 6 HOURS PRN
Status: DISCONTINUED | OUTPATIENT
Start: 2025-02-24 | End: 2025-02-24

## 2025-02-24 RX ORDER — FOLIC ACID 1 MG/1
1 TABLET ORAL DAILY
COMMUNITY
Start: 2024-11-22

## 2025-02-24 RX ORDER — SODIUM CHLORIDE 9 MG/ML
INJECTION, SOLUTION INTRAVENOUS PRN
Status: DISCONTINUED | OUTPATIENT
Start: 2025-02-24 | End: 2025-02-27 | Stop reason: HOSPADM

## 2025-02-24 RX ORDER — HEPARIN SODIUM 5000 [USP'U]/ML
5000 INJECTION, SOLUTION INTRAVENOUS; SUBCUTANEOUS EVERY 8 HOURS SCHEDULED
Status: DISCONTINUED | OUTPATIENT
Start: 2025-02-24 | End: 2025-02-27 | Stop reason: HOSPADM

## 2025-02-24 RX ORDER — MAGNESIUM SULFATE IN WATER 40 MG/ML
2000 INJECTION, SOLUTION INTRAVENOUS PRN
Status: DISCONTINUED | OUTPATIENT
Start: 2025-02-24 | End: 2025-02-24

## 2025-02-24 RX ORDER — POTASSIUM CHLORIDE 7.45 MG/ML
10 INJECTION INTRAVENOUS PRN
Status: DISCONTINUED | OUTPATIENT
Start: 2025-02-24 | End: 2025-02-24

## 2025-02-24 RX ORDER — SPIRONOLACTONE 25 MG/1
37.5 TABLET ORAL DAILY
Status: DISCONTINUED | OUTPATIENT
Start: 2025-02-24 | End: 2025-02-27 | Stop reason: HOSPADM

## 2025-02-24 RX ORDER — FOLIC ACID 1 MG/1
1000 TABLET ORAL DAILY
Status: DISCONTINUED | OUTPATIENT
Start: 2025-02-25 | End: 2025-02-27 | Stop reason: HOSPADM

## 2025-02-24 RX ORDER — ONDANSETRON 2 MG/ML
4 INJECTION INTRAMUSCULAR; INTRAVENOUS ONCE
Status: COMPLETED | OUTPATIENT
Start: 2025-02-24 | End: 2025-02-24

## 2025-02-24 RX ORDER — 0.9 % SODIUM CHLORIDE 0.9 %
1000 INTRAVENOUS SOLUTION INTRAVENOUS ONCE
Status: COMPLETED | OUTPATIENT
Start: 2025-02-24 | End: 2025-02-24

## 2025-02-24 RX ORDER — POTASSIUM CHLORIDE 750 MG/1
10 TABLET, EXTENDED RELEASE ORAL 2 TIMES DAILY WITH MEALS
Status: DISCONTINUED | OUTPATIENT
Start: 2025-02-24 | End: 2025-02-27 | Stop reason: HOSPADM

## 2025-02-24 RX ORDER — SODIUM CHLORIDE 0.9 % (FLUSH) 0.9 %
5-40 SYRINGE (ML) INJECTION PRN
Status: DISCONTINUED | OUTPATIENT
Start: 2025-02-24 | End: 2025-02-27 | Stop reason: HOSPADM

## 2025-02-24 RX ORDER — CHOLECALCIFEROL (VITAMIN D3) 50 MCG
3000 TABLET ORAL DAILY
Status: DISCONTINUED | OUTPATIENT
Start: 2025-02-25 | End: 2025-02-27 | Stop reason: HOSPADM

## 2025-02-24 RX ORDER — ALLOPURINOL 100 MG/1
200 TABLET ORAL DAILY
COMMUNITY

## 2025-02-24 RX ORDER — LATANOPROST 50 UG/ML
1 SOLUTION/ DROPS OPHTHALMIC NIGHTLY
Status: DISCONTINUED | OUTPATIENT
Start: 2025-02-24 | End: 2025-02-27 | Stop reason: HOSPADM

## 2025-02-24 RX ORDER — LISINOPRIL 10 MG/1
10 TABLET ORAL NIGHTLY
Status: DISCONTINUED | OUTPATIENT
Start: 2025-02-24 | End: 2025-02-27 | Stop reason: HOSPADM

## 2025-02-24 RX ORDER — FUROSEMIDE 20 MG/1
20 TABLET ORAL DAILY
Status: DISCONTINUED | OUTPATIENT
Start: 2025-02-24 | End: 2025-02-27 | Stop reason: HOSPADM

## 2025-02-24 RX ORDER — ASCORBIC ACID 500 MG
500 TABLET ORAL DAILY
Status: DISCONTINUED | OUTPATIENT
Start: 2025-02-25 | End: 2025-02-27 | Stop reason: HOSPADM

## 2025-02-24 RX ORDER — DOXAZOSIN 4 MG/1
8 TABLET ORAL NIGHTLY
Status: DISCONTINUED | OUTPATIENT
Start: 2025-02-24 | End: 2025-02-27 | Stop reason: HOSPADM

## 2025-02-24 RX ORDER — LACTULOSE 10 G/15ML
SOLUTION ORAL
COMMUNITY
Start: 2025-02-11

## 2025-02-24 RX ORDER — LACTULOSE 10 G/15ML
10 SOLUTION ORAL DAILY PRN
Status: DISCONTINUED | OUTPATIENT
Start: 2025-02-25 | End: 2025-02-27

## 2025-02-24 RX ORDER — MORPHINE SULFATE 2 MG/ML
2 INJECTION, SOLUTION INTRAMUSCULAR; INTRAVENOUS ONCE
Status: COMPLETED | OUTPATIENT
Start: 2025-02-24 | End: 2025-02-24

## 2025-02-24 RX ORDER — PANTOPRAZOLE SODIUM 40 MG/1
40 TABLET, DELAYED RELEASE ORAL
Status: DISCONTINUED | OUTPATIENT
Start: 2025-02-25 | End: 2025-02-27 | Stop reason: HOSPADM

## 2025-02-24 RX ORDER — ALLOPURINOL 100 MG/1
200 TABLET ORAL DAILY
Status: DISCONTINUED | OUTPATIENT
Start: 2025-02-25 | End: 2025-02-27 | Stop reason: HOSPADM

## 2025-02-24 RX ORDER — ENOXAPARIN SODIUM 100 MG/ML
40 INJECTION SUBCUTANEOUS DAILY
Status: DISCONTINUED | OUTPATIENT
Start: 2025-02-25 | End: 2025-02-24

## 2025-02-24 RX ORDER — IOPAMIDOL 755 MG/ML
75 INJECTION, SOLUTION INTRAVASCULAR
Status: COMPLETED | OUTPATIENT
Start: 2025-02-24 | End: 2025-02-24

## 2025-02-24 RX ORDER — FERROUS SULFATE 325(65) MG
325 TABLET ORAL 2 TIMES DAILY
Status: DISCONTINUED | OUTPATIENT
Start: 2025-02-24 | End: 2025-02-27 | Stop reason: HOSPADM

## 2025-02-24 RX ADMIN — LATANOPROST 1 DROP: 50 SOLUTION OPHTHALMIC at 22:19

## 2025-02-24 RX ADMIN — HYDROCODONE BITARTRATE AND ACETAMINOPHEN 1 TABLET: 5; 325 TABLET ORAL at 19:50

## 2025-02-24 RX ADMIN — DOXAZOSIN 8 MG: 4 TABLET ORAL at 22:19

## 2025-02-24 RX ADMIN — IOPAMIDOL 75 ML: 755 INJECTION, SOLUTION INTRAVENOUS at 17:17

## 2025-02-24 RX ADMIN — MORPHINE SULFATE 2 MG: 2 INJECTION, SOLUTION INTRAMUSCULAR; INTRAVENOUS at 14:38

## 2025-02-24 RX ADMIN — FERROUS SULFATE TAB 325 MG (65 MG ELEMENTAL FE) 325 MG: 325 (65 FE) TAB at 22:20

## 2025-02-24 RX ADMIN — SODIUM CHLORIDE 1000 ML: 9 INJECTION, SOLUTION INTRAVENOUS at 15:37

## 2025-02-24 RX ADMIN — ONDANSETRON 4 MG: 2 INJECTION, SOLUTION INTRAMUSCULAR; INTRAVENOUS at 15:37

## 2025-02-24 ASSESSMENT — PAIN DESCRIPTION - PAIN TYPE
TYPE: ACUTE PAIN
TYPE: ACUTE PAIN

## 2025-02-24 ASSESSMENT — PAIN DESCRIPTION - ORIENTATION
ORIENTATION: LEFT
ORIENTATION: LEFT

## 2025-02-24 ASSESSMENT — ENCOUNTER SYMPTOMS
SHORTNESS OF BREATH: 0
SINUS PRESSURE: 0
EYE REDNESS: 0
VOMITING: 0
ABDOMINAL PAIN: 0
DIARRHEA: 0
WHEEZING: 0
COUGH: 0
SORE THROAT: 0
BACK PAIN: 0
EYE DISCHARGE: 0
NAUSEA: 0
EYE PAIN: 0

## 2025-02-24 ASSESSMENT — PAIN DESCRIPTION - LOCATION
LOCATION: HIP

## 2025-02-24 ASSESSMENT — PAIN SCALES - GENERAL
PAINLEVEL_OUTOF10: 10
PAINLEVEL_OUTOF10: 5
PAINLEVEL_OUTOF10: 10
PAINLEVEL_OUTOF10: 8

## 2025-02-24 ASSESSMENT — LIFESTYLE VARIABLES
HOW MANY STANDARD DRINKS CONTAINING ALCOHOL DO YOU HAVE ON A TYPICAL DAY: PATIENT DOES NOT DRINK
HOW OFTEN DO YOU HAVE A DRINK CONTAINING ALCOHOL: NEVER

## 2025-02-24 ASSESSMENT — PAIN DESCRIPTION - ONSET: ONSET: ON-GOING

## 2025-02-24 ASSESSMENT — PAIN DESCRIPTION - FREQUENCY
FREQUENCY: CONTINUOUS
FREQUENCY: INTERMITTENT

## 2025-02-24 ASSESSMENT — PAIN - FUNCTIONAL ASSESSMENT: PAIN_FUNCTIONAL_ASSESSMENT: 0-10

## 2025-02-24 ASSESSMENT — PAIN DESCRIPTION - DESCRIPTORS: DESCRIPTORS: ACHING;BURNING;DISCOMFORT

## 2025-02-24 NOTE — ED NOTES
ED to Inpatient Handoff Report    Notified Blanca that electronic handoff available and patient ready for transport to room 641.    Safety Risks: Home safety issues, Difficulty with daily activities, and Risk of falls    Patient in Restraints: no    Constant Observer or Patient : no    Telemetry Monitoring Ordered :No           Order to transfer to unit without monitor:YES    Last MEWS: 1 Time completed: 1843    Deterioration Index Score:   Predictive Model Details          23 (Normal)  Factor Value    Calculated 2/24/2025 18:44 67% Age 89 years old    Deterioration Index Model 13% WBC count abnormal (12.6 k/uL)     6% Systolic 136     4% BUN abnormal (28 mg/dL)     3% Sodium 135 mmol/L     3% Respiratory rate 17     2% Potassium 3.7 mmol/L     1% Hematocrit abnormal (36.0 %)     1% Pulse oximetry 97 %     0% Temperature 97.9 °F (36.6 °C)     0% Pulse 69        Vitals:    02/24/25 1539 02/24/25 1553 02/24/25 1559 02/24/25 1843   BP: (!) 72/42 (!) 105/49 (!) 110/57 136/64   Pulse:   59 69   Resp:   18 17   Temp:    97.9 °F (36.6 °C)   TempSrc:    Oral   SpO2:   97% 97%   Weight:       Height:             Opportunity for questions and clarification was provided.

## 2025-02-24 NOTE — H&P
[facility] Hospitalist Group   History and Physical      CHIEF COMPLAINT:  mechanical fall, abdominal pain    History of Present Illness:  89 y.o. male with a history of HTN presents with mechanical fall.  Patient reports he was ambulating from his sink to toilet in bathroom when he fell onto the left hip and left elbow.  Patient reports he did not lose consciousness, denies head strike, denies anticoagulation. States he uses a 4 wheel walker to ambulate at home. Patient also reports nausea/generalized abdominal pain.  Hypotensive in the ED following pain medication administration.  BP now stable at 110/57 following 1L NSS bolus.  Left shoulder x-ray showed moderate arthritis at glenohumeral joint and AC joint and narrowing of subacromial space to rotator cuff, however showed no acute process in the chest or shoulder.  X-ray left hip and pelvis showed acute fracture of left proximal femoral diaphysis.  CTAP ordered.  Pertinent abnormal labs: WBC 12.6, RBC 3.78, Hgb 10.6, HCT 36.0, glucose 108, BUN 28, creatinine 1.4 (baseline 0.8-1.0), GFR 48, APTT 39.9.lipase, procalcitonin, BNP pending.  ED course included 4 mg Zofran, 2 mg morphine, 1L NSS bolus.    Informant(s) for H&P: Patient and EMR    REVIEW OF SYSTEMS:  no fevers, chills, cp, sob, n/v, ha, vision/hearing changes, wt changes, hot/cold flashes, other open skin lesions, diarrhea, constipation, dysuria/hematuria unless noted in HPI. Complete ROS performed with the patient and is otherwise negative.      PMH:  Past Medical History:   Diagnosis Date    Abnormal brain MRI 03/26/2020    Episode of syncope 03/25/2020    GI bleed     FOBT negative 3/29/20    Hypertension        Surgical History:  Past Surgical History:   Procedure Laterality Date    BACK SURGERY      CARPAL TUNNEL RELEASE Bilateral     COLONOSCOPY N/A 4/26/2020    COLONOSCOPY DIAGNOSTIC performed by Juma Forte MD at [facility] OR    EYE SURGERY      HERNIA REPAIR      IR EMBOLIZATION  COMPARISON: 08/09/2020 HISTORY: ORDERING SYSTEM PROVIDED HISTORY: fall TECHNOLOGIST PROVIDED HISTORY: Reason for exam:->fall FINDINGS: Chest: The lungs are without acute focal process.  There is no effusion or pneumothorax. The cardiomediastinal silhouette is without acute process. Thoracic aorta is slightly tortuous, unchanged.  The osseous structures are without acute process. 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  Scapula appears intact.  Distal clavicle appears normal.  Proximal humerus appears normal.  Osteopenia.     1. No acute process in the chest or shoulder. 2. Moderate osteoarthritis of the glenohumeral joint and AC joint 3. Narrowing of the subacromial space related to rotator cuff disease.     XR CHEST PORTABLE    Result Date: 2/24/2025  EXAMINATION: ONE XRAY VIEW OF THE CHEST; THREE XRAY VIEWS OF THE LEFT SHOULDER 2/24/2025 3:03 pm COMPARISON: 08/09/2020 HISTORY: ORDERING SYSTEM PROVIDED HISTORY: fall TECHNOLOGIST PROVIDED HISTORY: Reason for exam:->fall FINDINGS: Chest: The lungs are without acute focal process.  There is no effusion or pneumothorax. The cardiomediastinal silhouette is without acute process. Thoracic aorta is slightly tortuous, unchanged.  The osseous structures are without acute process. 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  Scapula appears intact.  Distal clavicle appears normal.  Proximal humerus appears normal.  Osteopenia.     1. No acute process in the chest or shoulder. 2. Moderate osteoarthritis of the glenohumeral joint and AC joint 3. Narrowing of the subacromial space related to rotator cuff disease.       EKG:     ASSESSMENT:      Principal Problem:    Closed left hip fracture, initial encounter

## 2025-02-24 NOTE — PROGRESS NOTES
Orthopedic Surgery    Consult received, images reviewed showing left hip greater trochanter periprosthetic fracture.  Stem appears stable.  Anticipate non operative treatment.  He is being admitted to medicine per ED.   Recommend CT scan left hip.  Will see in AM, full note to follow.      Jose Alberto Martínez MD  Orthopaedic Surgery

## 2025-02-24 NOTE — ED PROVIDER NOTES
.  89-year-old male presents to the emergency department after mechanical fall at home patient was coming out of the bathroom when he lost his balance and fell on his left hip he also reports stretching out his left elbow and is having some difficulty with range of motion of the left elbow.  The patient reports no other injuries no head injury no anticoagulation use no loss of consciousness chest pain shortness of breath dizziness abdominal pain or other complaints at this time    The history is provided by the patient.   Leg Injury  Location:  Hip  Time since incident:  1 hour  Injury: yes    Mechanism of injury: fall    Fall:     Impact surface:  Hard floor  Hip location:  L hip  Pain details:     Quality:  Aching    Radiates to:  Does not radiate    Severity:  Moderate    Onset quality:  Sudden    Duration:  1 hour    Timing:  Intermittent    Progression:  Waxing and waning  Chronicity:  New  Relieved by:  Nothing  Worsened by:  Nothing  Ineffective treatments:  None tried  Associated symptoms: decreased ROM    Associated symptoms: no back pain and no fever         Review of Systems   Constitutional:  Negative for chills and fever.   HENT:  Negative for ear pain, sinus pressure and sore throat.    Eyes:  Negative for pain, discharge and redness.   Respiratory:  Negative for cough, shortness of breath and wheezing.    Cardiovascular:  Negative for chest pain.   Gastrointestinal:  Negative for abdominal pain, diarrhea, nausea and vomiting.   Genitourinary:  Negative for dysuria and frequency.   Musculoskeletal:  Negative for arthralgias and back pain.   Skin:  Negative for rash and wound.   Neurological:  Negative for weakness and headaches.   Hematological:  Negative for adenopathy.   All other systems reviewed and are negative.       Physical Exam  Constitutional:       Appearance: Normal appearance.   HENT:      Head: Normocephalic and atraumatic.      Nose: Nose normal.      Mouth/Throat:      Mouth: Mucous

## 2025-02-24 NOTE — PROGRESS NOTES
Database initiated pharmacy and medications verified with the patients wife and son at bedside. He is A&O comes in from home with wife. He uses a walker and is RA at baseline. He is here for a fall.

## 2025-02-25 PROBLEM — S72.002A CLOSED FRACTURE OF LEFT HIP (HCC): Status: ACTIVE | Noted: 2025-02-25

## 2025-02-25 LAB
ANION GAP SERPL CALCULATED.3IONS-SCNC: 13 MMOL/L (ref 7–16)
BASOPHILS # BLD: 0.01 K/UL (ref 0–0.2)
BASOPHILS NFR BLD: 0 % (ref 0–2)
BUN SERPL-MCNC: 25 MG/DL (ref 6–23)
CALCIUM SERPL-MCNC: 9.2 MG/DL (ref 8.6–10.2)
CHLORIDE SERPL-SCNC: 102 MMOL/L (ref 98–107)
CK SERPL-CCNC: 89 U/L (ref 20–200)
CO2 SERPL-SCNC: 20 MMOL/L (ref 22–29)
CREAT SERPL-MCNC: 1.3 MG/DL (ref 0.7–1.2)
EOSINOPHIL # BLD: 0 K/UL (ref 0.05–0.5)
EOSINOPHILS RELATIVE PERCENT: 0 % (ref 0–6)
ERYTHROCYTE [DISTWIDTH] IN BLOOD BY AUTOMATED COUNT: 15.2 % (ref 11.5–15)
GFR, ESTIMATED: 51 ML/MIN/1.73M2
GLUCOSE SERPL-MCNC: 95 MG/DL (ref 74–99)
HCT VFR BLD AUTO: 37.3 % (ref 37–54)
HGB BLD-MCNC: 10.9 G/DL (ref 12.5–16.5)
IMM GRANULOCYTES # BLD AUTO: 0.09 K/UL (ref 0–0.58)
IMM GRANULOCYTES NFR BLD: 1 % (ref 0–5)
LYMPHOCYTES NFR BLD: 1.38 K/UL (ref 1.5–4)
LYMPHOCYTES RELATIVE PERCENT: 14 % (ref 20–42)
MCH RBC QN AUTO: 28.1 PG (ref 26–35)
MCHC RBC AUTO-ENTMCNC: 29.2 G/DL (ref 32–34.5)
MCV RBC AUTO: 96.1 FL (ref 80–99.9)
MONOCYTES NFR BLD: 2.22 K/UL (ref 0.1–0.95)
MONOCYTES NFR BLD: 22 % (ref 2–12)
NEUTROPHILS NFR BLD: 63 % (ref 43–80)
NEUTS SEG NFR BLD: 6.29 K/UL (ref 1.8–7.3)
PLATELET # BLD AUTO: 332 K/UL (ref 130–450)
PMV BLD AUTO: 11.8 FL (ref 7–12)
POTASSIUM SERPL-SCNC: 3.8 MMOL/L (ref 3.5–5)
RBC # BLD AUTO: 3.88 M/UL (ref 3.8–5.8)
RBC # BLD: ABNORMAL 10*6/UL
SODIUM SERPL-SCNC: 135 MMOL/L (ref 132–146)
WBC OTHER # BLD: 10 K/UL (ref 4.5–11.5)

## 2025-02-25 PROCEDURE — 99232 SBSQ HOSP IP/OBS MODERATE 35: CPT | Performed by: STUDENT IN AN ORGANIZED HEALTH CARE EDUCATION/TRAINING PROGRAM

## 2025-02-25 PROCEDURE — 85025 COMPLETE CBC W/AUTO DIFF WBC: CPT

## 2025-02-25 PROCEDURE — 97165 OT EVAL LOW COMPLEX 30 MIN: CPT

## 2025-02-25 PROCEDURE — 97530 THERAPEUTIC ACTIVITIES: CPT

## 2025-02-25 PROCEDURE — 2580000003 HC RX 258

## 2025-02-25 PROCEDURE — 2500000003 HC RX 250 WO HCPCS

## 2025-02-25 PROCEDURE — 1200000000 HC SEMI PRIVATE

## 2025-02-25 PROCEDURE — 82550 ASSAY OF CK (CPK): CPT

## 2025-02-25 PROCEDURE — 36415 COLL VENOUS BLD VENIPUNCTURE: CPT

## 2025-02-25 PROCEDURE — 99222 1ST HOSP IP/OBS MODERATE 55: CPT | Performed by: ORTHOPAEDIC SURGERY

## 2025-02-25 PROCEDURE — 80048 BASIC METABOLIC PNL TOTAL CA: CPT

## 2025-02-25 PROCEDURE — 6370000000 HC RX 637 (ALT 250 FOR IP)

## 2025-02-25 RX ADMIN — ATORVASTATIN CALCIUM 10 MG: 10 TABLET, FILM COATED ORAL at 08:48

## 2025-02-25 RX ADMIN — HYDROCODONE BITARTRATE AND ACETAMINOPHEN 1 TABLET: 5; 325 TABLET ORAL at 04:21

## 2025-02-25 RX ADMIN — SODIUM CHLORIDE: 0.9 INJECTION, SOLUTION INTRAVENOUS at 10:10

## 2025-02-25 RX ADMIN — ACETAMINOPHEN 650 MG: 325 TABLET ORAL at 14:12

## 2025-02-25 RX ADMIN — Medication 3000 UNITS: at 08:49

## 2025-02-25 RX ADMIN — FOLIC ACID 1000 MCG: 1 TABLET ORAL at 08:48

## 2025-02-25 RX ADMIN — FERROUS SULFATE TAB 325 MG (65 MG ELEMENTAL FE) 325 MG: 325 (65 FE) TAB at 08:48

## 2025-02-25 RX ADMIN — POTASSIUM CHLORIDE 10 MEQ: 750 TABLET, EXTENDED RELEASE ORAL at 18:14

## 2025-02-25 RX ADMIN — POTASSIUM CHLORIDE 10 MEQ: 750 TABLET, EXTENDED RELEASE ORAL at 08:48

## 2025-02-25 RX ADMIN — SODIUM CHLORIDE, PRESERVATIVE FREE 10 ML: 5 INJECTION INTRAVENOUS at 08:51

## 2025-02-25 RX ADMIN — ALLOPURINOL 200 MG: 100 TABLET ORAL at 08:48

## 2025-02-25 RX ADMIN — OXYCODONE HYDROCHLORIDE AND ACETAMINOPHEN 500 MG: 500 TABLET ORAL at 08:49

## 2025-02-25 ASSESSMENT — PAIN DESCRIPTION - DESCRIPTORS
DESCRIPTORS: ACHING;BURNING;DISCOMFORT
DESCRIPTORS: ACHING;SORE

## 2025-02-25 ASSESSMENT — PAIN SCALES - GENERAL
PAINLEVEL_OUTOF10: 0
PAINLEVEL_OUTOF10: 5
PAINLEVEL_OUTOF10: 2
PAINLEVEL_OUTOF10: 7

## 2025-02-25 ASSESSMENT — PAIN DESCRIPTION - FREQUENCY: FREQUENCY: INTERMITTENT

## 2025-02-25 ASSESSMENT — PAIN DESCRIPTION - LOCATION
LOCATION: HIP
LOCATION: HIP

## 2025-02-25 ASSESSMENT — PAIN DESCRIPTION - PAIN TYPE: TYPE: ACUTE PAIN

## 2025-02-25 ASSESSMENT — PAIN DESCRIPTION - ORIENTATION: ORIENTATION: LEFT

## 2025-02-25 ASSESSMENT — PAIN - FUNCTIONAL ASSESSMENT: PAIN_FUNCTIONAL_ASSESSMENT: PREVENTS OR INTERFERES SOME ACTIVE ACTIVITIES AND ADLS

## 2025-02-25 ASSESSMENT — PAIN DESCRIPTION - ONSET: ONSET: ON-GOING

## 2025-02-25 NOTE — PROGRESS NOTES
Patient educated on the importance of turns to prevent skin breakdown.  Patient c/o pain with wedges and pillow.  Will continue to encourage patient to turn.

## 2025-02-25 NOTE — PROGRESS NOTES
Occupational Therapy  OCCUPATIONAL THERAPY INITIAL EVALUATION    [facility]   [address], [place]         Date:2025                                                  Patient Name: Ricci Mahajan    MRN: [id number]    : 3/10/1931    Room: [address] [place] [address]      Evaluating OT: Karely Love OTR/L [id number]       Referring Provider:Kay Diamond APRN - CNP     Specific Provider Orders/Date: OT eval and treat 25      Diagnosis: Closed Hip Fracture     Surgery: none     Pertinent Medical History: HTN, B RUTH ANN,Back surgery,Hernia Repair         Precautions:  Fall Risk, WBAT LLE    Assessment of current deficits    [x] Functional mobility  [x]ADLs  [x] Strength               []Cognition    [x] Functional transfers   [x] IADLs         [] Safety Awareness   [x]Endurance    [] Fine Coordination              [x] Balance      [] Vision/perception   []Sensation     []Gross Motor Coordination  [] ROM  [] Delirium                   [] Motor Control     OT PLAN OF CARE   OT POC based on physician orders, patient diagnosis and results of clinical assessment    Frequency/Duration 2-5 days/wk for 2-4 weeks PRN   Specific OT Treatment Interventions to include:   * Instruction/training on adapted ADL techniques and AE recommendations to increase functional independence within precautions       * Training on energy conservation strategies, correct breathing pattern and techniques to improve independence/tolerance for self-care routine  * Functional transfer/mobility training/DME recommendations for increased independence, safety, and fall prevention  * Patient/Family education to increase follow through with safety techniques and functional independence  * Recommendation of environmental modifications for increased safety with functional transfers/mobility and ADLs  * Therapeutic exercise to improve motor endurance, ROM, and functional strength for

## 2025-02-25 NOTE — PLAN OF CARE
Problem: ABCDS Injury Assessment  Goal: Absence of physical injury  Outcome: Progressing     Problem: Discharge Planning  Goal: Discharge to home or other facility with appropriate resources  Outcome: Progressing     Problem: Chronic Conditions and Co-morbidities  Goal: Patient's chronic conditions and co-morbidity symptoms are monitored and maintained or improved  Outcome: Progressing     Problem: Pain  Goal: Verbalizes/displays adequate comfort level or baseline comfort level  Outcome: Progressing     Problem: Safety - Adult  Goal: Free from fall injury  Outcome: Progressing     Problem: Skin/Tissue Integrity  Goal: Skin integrity remains intact  Description: 1.  Monitor for areas of redness and/or skin breakdown  2.  Assess vascular access sites hourly  3.  Every 4-6 hours minimum:  Change oxygen saturation probe site  4.  Every 4-6 hours:  If on nasal continuous positive airway pressure, respiratory therapy assess nares and determine need for appliance change or resting period  Outcome: Progressing

## 2025-02-25 NOTE — PROGRESS NOTES
supple, trachea midline   Pulmonary/Chest: CTAB, no w/r/r, normal air movement, no respiratory distress  Cardiovascular: RRR, no murmurs  Abdomen: soft, non-tender, non-distended  Extremities: no cyanosis, no clubbing and trace BLE edema  Neurologic: no cranial nerve deficit and speech normal      Recent Labs     02/24/25  1443 02/25/25  0411    135   K 3.7 3.8    102   CO2 23 20*   BUN 28* 25*   CREATININE 1.4* 1.3*   GLUCOSE 108* 95   CALCIUM 9.5 9.2       Recent Labs     02/24/25  1443 02/25/25  0411   WBC 12.6* 10.0   RBC 3.78* 3.88   HGB 10.6* 10.9*   HCT 36.0* 37.3   MCV 95.2 96.1   MCH 28.0 28.1   MCHC 29.4* 29.2*   RDW 15.0 15.2*    332   MPV 11.2 11.8       Radiology:  CT ABDOMEN PELVIS W IV CONTRAST Additional Contrast? None   Final Result   1. Mildly displaced and comminuted left femoral gray-implant fracture   2. No significant abdominopelvic injury identified within limitations   3. Couple of indeterminate right renal lesions noted as discussed above         CT HIP LEFT WO CONTRAST   Final Result   1. Mildly displaced and comminuted left femoral gray-implant fracture   2. No significant abdominopelvic injury identified within limitations   3. Couple of indeterminate right renal lesions noted as discussed above         XR SHOULDER LEFT (MIN 2 VIEWS)   Final Result   1. No acute process in the chest or shoulder.   2. Moderate osteoarthritis of the glenohumeral joint and AC joint   3. Narrowing of the subacromial space related to rotator cuff disease.         XR CHEST PORTABLE   Final Result   1. No acute process in the chest or shoulder.   2. Moderate osteoarthritis of the glenohumeral joint and AC joint   3. Narrowing of the subacromial space related to rotator cuff disease.         XR HIP 2-3 VW W PELVIS LEFT   Final Result   1. Findings suspicious for acute fracture of the left proximal femoral   diaphysis. CT of the left hip is recommended for further evaluation.   2. Status post  bilateral hip arthroplasty.              Assessment:  Principal Problem:    Closed left hip fracture, initial encounter ([facility])  Active Problems:    Chronic anemia    Essential hypertension    VALERIE (acute kidney injury)    HLD (hyperlipidemia)  Resolved Problems:    * No resolved hospital problems. *      Plan:  Left hip fracture 2/2 mechanical fall- X-ray left hip and pelvis showed acute fracture of left proximal femoral diaphysis. CT L hip. Consult to orthopedic surgery, appreciate their input, no plans for acute surgical intervention at this time, cont DVT ppx. PT/OT to eval. PRN pain medication.  Hypotension- 60/42 on admit now resolved, likely related to pain medication administration  VALERIE- creat 1.4 on admit (baseline 0.8-1.0). cont gentle hydration. Monitor on daily BMP. Hold lisinopril and diuretics.   Leukocytosis- 12.6. likely reactive, now resolved. Procalcitonin negative.   Nausea/abdominal pain- CTAP w/o clear source. PRN antiemetics ordered.   HFpEF-no evidence of acute exacerbation. last echo 3/2020 showed EF 55%.  Monitor I's and O's and daily weights. Will monitor BNP. Hold lisinopril and diuretics in setting of VALERIE.   Essential HTN- hold lisinopril and diuretics in setting of VALERIE. PRN antihypertensive.   HLD-continue statin  Chronic Anemia-hgb 10.6. monitor on daily CBC.  Transfuse <7.0.  Chronic thrombocytopenia-platelets currently stable wnl at 380. monitor on daily CBC.  BPH- continue home meds      Dispo: pending PT OT    NOTE: Portions of this report may have been transcribed using voice recognition software. Every effort was made to ensure accuracy; however, inadvertent computerized transcription errors may be present.  Electronically signed by Kitty Lam MD on 2/25/2025 at 3:34 PM

## 2025-02-25 NOTE — PROGRESS NOTES
4 Eyes Skin Assessment     NAME:  Ricci Mahajan  YOB: 1931  MEDICAL RECORD NUMBER:  [id number]    The patient is being assessed for  Admission    I agree that at least one RN has performed a thorough Head to Toe Skin Assessment on the patient. ALL assessment sites listed below have been assessed.      Areas assessed by both nurses:    Head, Face, Ears, Shoulders, Back, Chest, Arms, Elbows, Hands, Sacrum. Buttock, Coccyx, Ischium, Legs. Feet and Heels, and Under Medical Devices         Does the Patient have a Wound? No noted wound(s)     Redness to buttocks  Boby Prevention initiated by RN: No  Wound Care Orders initiated by RN: No    Pressure Injury (Stage 3,4, Unstageable, DTI, NWPT, and Complex wounds) if present, place Wound referral order by RN under : No    New Ostomies, if present place, Ostomy referral order under : No     Nurse 1 eSignature: Electronically signed by Migdalia Joshua RN on 2/24/25 at 10:34 PM EST    **SHARE this note so that the co-signing nurse can place an eSignature**    Nurse 2 eSignature: Electronically signed by Terri Khan RN on 2/24/25 at 10:42 PM EST

## 2025-02-25 NOTE — PROGRESS NOTES
[facility] Quality Flow/Interdisciplinary Rounds Progress Note        Quality Flow Rounds held on February 25, 2025    Disciplines Attending:  Bedside Nurse, , , and Nursing Unit Leadership    Ricci Mahajan was admitted on 2/24/2025  1:49 PM    Anticipated Discharge Date:       Disposition:    Boby Score:  Boby Scale Score: 19    [facility] RISK OF UNPLANNED READMISSION 2.0             14.5 Total Score        Discussed patient goal for the day, patient clinical progression, and barriers to discharge.  The following Goal(s) of the Day/Commitment(s) have been identified:   discharge planning, ortho, PT/OT, IV fluids, no surgry      Alfonso Mcgowan RN  February 25, 2025

## 2025-02-25 NOTE — PROGRESS NOTES
Spiritual Health History and Assessment/Progress Note  [facility]    Initial Encounter, Attempted Encounter,  ,  ,      Name: Ricci Mahajan MRN: [id number]    Age: 89 y.o.     Sex: male   Language: English   Episcopalian: Yazidi   Closed left hip fracture, initial encounter ([facility])     Date: 2/25/2025                           Spiritual Assessment began in [facility]S INTERMEDIATE        Referral/Consult From: Rounding   Encounter Overview/Reason: Initial Encounter, Attempted Encounter  Service Provided For: Patient    Susy, Belief, Meaning:   Patient is connected with a susy tradition or spiritual practice  Family/Friends No family/friends present      Importance and Influence:  Patient unable to assess at this time  Family/Friends No family/friends present    Community:  Patient feels well-supported. Support system includes: Spouse/Partner and Children  Family/Friends No family/friends present    Assessment and Plan of Care:     Patient Interventions include: Other: Patient asleep and appeared calm, did not disturb. Offered prayer silently for the patient and left a prayer card in the room.  Family/Friends Interventions include: No family/friends present    Patient Plan of Care: Spiritual Care available upon further referral  Family/Friends Plan of Care: No family/friends present    Electronically signed by Chaplain Sherri on 2/25/2025 at 4:04 PM

## 2025-02-26 LAB
ANION GAP SERPL CALCULATED.3IONS-SCNC: 11 MMOL/L (ref 7–16)
BASOPHILS # BLD: 0.08 K/UL (ref 0–0.2)
BASOPHILS NFR BLD: 1 % (ref 0–2)
BILIRUB UR QL STRIP: NEGATIVE
BUN SERPL-MCNC: 23 MG/DL (ref 6–23)
CALCIUM SERPL-MCNC: 9.3 MG/DL (ref 8.6–10.2)
CHLORIDE SERPL-SCNC: 103 MMOL/L (ref 98–107)
CLARITY UR: CLEAR
CO2 SERPL-SCNC: 21 MMOL/L (ref 22–29)
COLOR UR: YELLOW
CREAT SERPL-MCNC: 1.4 MG/DL (ref 0.7–1.2)
CREAT UR-MCNC: 60.8 MG/DL (ref 40–278)
EOSINOPHIL # BLD: 0.08 K/UL (ref 0.05–0.5)
EOSINOPHILS RELATIVE PERCENT: 1 % (ref 0–6)
ERYTHROCYTE [DISTWIDTH] IN BLOOD BY AUTOMATED COUNT: 15.2 % (ref 11.5–15)
GFR, ESTIMATED: 46 ML/MIN/1.73M2
GLUCOSE SERPL-MCNC: 96 MG/DL (ref 74–99)
GLUCOSE UR STRIP-MCNC: NEGATIVE MG/DL
HCT VFR BLD AUTO: 36.4 % (ref 37–54)
HGB BLD-MCNC: 10.8 G/DL (ref 12.5–16.5)
HGB UR QL STRIP.AUTO: NEGATIVE
KETONES UR STRIP-MCNC: NEGATIVE MG/DL
LEUKOCYTE ESTERASE UR QL STRIP: NEGATIVE
LYMPHOCYTES NFR BLD: 2.04 K/UL (ref 1.5–4)
LYMPHOCYTES RELATIVE PERCENT: 22 % (ref 20–42)
MCH RBC QN AUTO: 28.6 PG (ref 26–35)
MCHC RBC AUTO-ENTMCNC: 29.7 G/DL (ref 32–34.5)
MCV RBC AUTO: 96.3 FL (ref 80–99.9)
MONOCYTES NFR BLD: 1.31 K/UL (ref 0.1–0.95)
MONOCYTES NFR BLD: 14 % (ref 2–12)
NEUTROPHILS NFR BLD: 63 % (ref 43–80)
NEUTS SEG NFR BLD: 5.89 K/UL (ref 1.8–7.3)
NITRITE UR QL STRIP: NEGATIVE
PH UR STRIP: 6 [PH] (ref 5–8)
PLATELET # BLD AUTO: 365 K/UL (ref 130–450)
PMV BLD AUTO: 11.1 FL (ref 7–12)
POTASSIUM SERPL-SCNC: 4.2 MMOL/L (ref 3.5–5)
PROT UR STRIP-MCNC: ABNORMAL MG/DL
RBC # BLD AUTO: 3.78 M/UL (ref 3.8–5.8)
RBC # BLD: ABNORMAL 10*6/UL
RBC #/AREA URNS HPF: ABNORMAL /HPF
SODIUM SERPL-SCNC: 135 MMOL/L (ref 132–146)
SODIUM UR-SCNC: 59 MMOL/L
SP GR UR STRIP: 1.01 (ref 1–1.03)
UROBILINOGEN UR STRIP-ACNC: 0.2 EU/DL (ref 0–1)
UUN UR-MCNC: 503 MG/DL (ref 800–1666)
WBC #/AREA URNS HPF: ABNORMAL /HPF
WBC OTHER # BLD: 9.4 K/UL (ref 4.5–11.5)

## 2025-02-26 PROCEDURE — 2500000003 HC RX 250 WO HCPCS

## 2025-02-26 PROCEDURE — 6370000000 HC RX 637 (ALT 250 FOR IP)

## 2025-02-26 PROCEDURE — 97530 THERAPEUTIC ACTIVITIES: CPT

## 2025-02-26 PROCEDURE — 99232 SBSQ HOSP IP/OBS MODERATE 35: CPT | Performed by: STUDENT IN AN ORGANIZED HEALTH CARE EDUCATION/TRAINING PROGRAM

## 2025-02-26 PROCEDURE — 1200000000 HC SEMI PRIVATE

## 2025-02-26 PROCEDURE — 97535 SELF CARE MNGMENT TRAINING: CPT

## 2025-02-26 PROCEDURE — 6360000002 HC RX W HCPCS: Performed by: STUDENT IN AN ORGANIZED HEALTH CARE EDUCATION/TRAINING PROGRAM

## 2025-02-26 PROCEDURE — 81001 URINALYSIS AUTO W/SCOPE: CPT

## 2025-02-26 PROCEDURE — 82570 ASSAY OF URINE CREATININE: CPT

## 2025-02-26 PROCEDURE — 85025 COMPLETE CBC W/AUTO DIFF WBC: CPT

## 2025-02-26 PROCEDURE — 80048 BASIC METABOLIC PNL TOTAL CA: CPT

## 2025-02-26 PROCEDURE — 36415 COLL VENOUS BLD VENIPUNCTURE: CPT

## 2025-02-26 PROCEDURE — 84300 ASSAY OF URINE SODIUM: CPT

## 2025-02-26 PROCEDURE — 97161 PT EVAL LOW COMPLEX 20 MIN: CPT

## 2025-02-26 PROCEDURE — 6370000000 HC RX 637 (ALT 250 FOR IP): Performed by: STUDENT IN AN ORGANIZED HEALTH CARE EDUCATION/TRAINING PROGRAM

## 2025-02-26 PROCEDURE — 84540 ASSAY OF URINE/UREA-N: CPT

## 2025-02-26 RX ORDER — BISACODYL 5 MG/1
5 TABLET, DELAYED RELEASE ORAL DAILY PRN
Status: DISCONTINUED | OUTPATIENT
Start: 2025-02-26 | End: 2025-02-27 | Stop reason: HOSPADM

## 2025-02-26 RX ADMIN — HEPARIN SODIUM 5000 UNITS: 5000 INJECTION INTRAVENOUS; SUBCUTANEOUS at 00:18

## 2025-02-26 RX ADMIN — ATORVASTATIN CALCIUM 10 MG: 10 TABLET, FILM COATED ORAL at 08:12

## 2025-02-26 RX ADMIN — PANTOPRAZOLE SODIUM 40 MG: 40 TABLET, DELAYED RELEASE ORAL at 06:41

## 2025-02-26 RX ADMIN — FERROUS SULFATE TAB 325 MG (65 MG ELEMENTAL FE) 325 MG: 325 (65 FE) TAB at 21:21

## 2025-02-26 RX ADMIN — HYDROCODONE BITARTRATE AND ACETAMINOPHEN 1 TABLET: 5; 325 TABLET ORAL at 13:59

## 2025-02-26 RX ADMIN — ALLOPURINOL 200 MG: 100 TABLET ORAL at 08:11

## 2025-02-26 RX ADMIN — SODIUM CHLORIDE, PRESERVATIVE FREE 10 ML: 5 INJECTION INTRAVENOUS at 00:19

## 2025-02-26 RX ADMIN — FERROUS SULFATE TAB 325 MG (65 MG ELEMENTAL FE) 325 MG: 325 (65 FE) TAB at 00:18

## 2025-02-26 RX ADMIN — FOLIC ACID 1000 MCG: 1 TABLET ORAL at 08:11

## 2025-02-26 RX ADMIN — HYDROCODONE BITARTRATE AND ACETAMINOPHEN 1 TABLET: 5; 325 TABLET ORAL at 21:22

## 2025-02-26 RX ADMIN — LATANOPROST 1 DROP: 50 SOLUTION OPHTHALMIC at 21:22

## 2025-02-26 RX ADMIN — DOXAZOSIN 8 MG: 4 TABLET ORAL at 00:18

## 2025-02-26 RX ADMIN — SODIUM CHLORIDE, PRESERVATIVE FREE 10 ML: 5 INJECTION INTRAVENOUS at 21:22

## 2025-02-26 RX ADMIN — POTASSIUM CHLORIDE 10 MEQ: 750 TABLET, EXTENDED RELEASE ORAL at 08:12

## 2025-02-26 RX ADMIN — FERROUS SULFATE TAB 325 MG (65 MG ELEMENTAL FE) 325 MG: 325 (65 FE) TAB at 08:12

## 2025-02-26 RX ADMIN — ACETAMINOPHEN 650 MG: 325 TABLET ORAL at 09:43

## 2025-02-26 RX ADMIN — LATANOPROST 1 DROP: 50 SOLUTION OPHTHALMIC at 00:18

## 2025-02-26 RX ADMIN — LACTULOSE 10 G: 20 SOLUTION ORAL at 08:13

## 2025-02-26 RX ADMIN — SODIUM CHLORIDE, PRESERVATIVE FREE 10 ML: 5 INJECTION INTRAVENOUS at 08:17

## 2025-02-26 RX ADMIN — HEPARIN SODIUM 5000 UNITS: 5000 INJECTION INTRAVENOUS; SUBCUTANEOUS at 13:59

## 2025-02-26 RX ADMIN — BISACODYL 5 MG: 5 TABLET, COATED ORAL at 16:52

## 2025-02-26 RX ADMIN — DOXAZOSIN 8 MG: 4 TABLET ORAL at 21:21

## 2025-02-26 RX ADMIN — HEPARIN SODIUM 5000 UNITS: 5000 INJECTION INTRAVENOUS; SUBCUTANEOUS at 21:21

## 2025-02-26 RX ADMIN — OXYCODONE HYDROCHLORIDE AND ACETAMINOPHEN 500 MG: 500 TABLET ORAL at 08:12

## 2025-02-26 RX ADMIN — POTASSIUM CHLORIDE 10 MEQ: 750 TABLET, EXTENDED RELEASE ORAL at 16:52

## 2025-02-26 RX ADMIN — Medication 3000 UNITS: at 08:12

## 2025-02-26 RX ADMIN — HEPARIN SODIUM 5000 UNITS: 5000 INJECTION INTRAVENOUS; SUBCUTANEOUS at 06:41

## 2025-02-26 RX ADMIN — POLYETHYLENE GLYCOL 3350 17 G: 17 POWDER, FOR SOLUTION ORAL at 14:08

## 2025-02-26 ASSESSMENT — PAIN DESCRIPTION - LOCATION
LOCATION: HIP
LOCATION: LEG
LOCATION: KNEE;LEG

## 2025-02-26 ASSESSMENT — PAIN SCALES - GENERAL
PAINLEVEL_OUTOF10: 4
PAINLEVEL_OUTOF10: 7
PAINLEVEL_OUTOF10: 4

## 2025-02-26 ASSESSMENT — PAIN DESCRIPTION - DESCRIPTORS
DESCRIPTORS: ACHING
DESCRIPTORS: ACHING;SORE
DESCRIPTORS: ACHING;SORE

## 2025-02-26 ASSESSMENT — PAIN DESCRIPTION - ORIENTATION
ORIENTATION: LEFT

## 2025-02-26 NOTE — PLAN OF CARE
Problem: ABCDS Injury Assessment  Goal: Absence of physical injury  2/26/2025 0044 by Marry Peters RN  Outcome: Progressing  2/25/2025 1435 by Malvin Gonzalez RN  Outcome: Progressing     Problem: Discharge Planning  Goal: Discharge to home or other facility with appropriate resources  2/26/2025 0044 by Marry Peters RN  Outcome: Progressing  2/25/2025 1435 by Malvin Gonzalez RN  Outcome: Progressing     Problem: Chronic Conditions and Co-morbidities  Goal: Patient's chronic conditions and co-morbidity symptoms are monitored and maintained or improved  2/26/2025 0044 by Marry Peters RN  Outcome: Progressing  2/25/2025 1435 by Malvin Gonzalez RN  Outcome: Progressing     Problem: Pain  Goal: Verbalizes/displays adequate comfort level or baseline comfort level  2/26/2025 0044 by Marry Peters RN  Outcome: Progressing  2/25/2025 1435 by Malvin Gonzalez RN  Outcome: Progressing     Problem: Safety - Adult  Goal: Free from fall injury  2/26/2025 0044 by Marry Peters RN  Outcome: Progressing  2/25/2025 1435 by Malvin Gonzalez RN  Outcome: Progressing     Problem: Skin/Tissue Integrity  Goal: Skin integrity remains intact  Description: 1.  Monitor for areas of redness and/or skin breakdown  2.  Assess vascular access sites hourly  3.  Every 4-6 hours minimum:  Change oxygen saturation probe site  4.  Every 4-6 hours:  If on nasal continuous positive airway pressure, respiratory therapy assess nares and determine need for appliance change or resting period  2/26/2025 0044 by Marry Peters RN  Outcome: Progressing  2/25/2025 1435 by Malvin Gonzalez RN  Outcome: Progressing

## 2025-02-26 NOTE — PROGRESS NOTES
[facility] Hospitalist Progress Note    Admitting Date and Time: 2/24/2025  1:49 PM  Admit Dx: Elevated serum creatinine [R79.89]  Closed fracture of left hip, initial encounter ([facility]) [S72.002A]  Closed left hip fracture, initial encounter ([facility]) [S72.002A]    Subjective:  Patient is being followed for Elevated serum creatinine [R79.89]  Closed fracture of left hip, initial encounter ([facility]) [S72.002A]  Closed left hip fracture, initial encounter ([facility]) [S72.002A]   Pt feels okay  Per RN: no additional concerns    ROS: denies fever, chills, cp, sob, n/v, HA unless otherwise noted above     sodium chloride flush  5-40 mL IntraVENous 2 times per day    allopurinol  200 mg Oral Daily    doxazosin  8 mg Oral Nightly    ferrous sulfate  325 mg Oral BID    folic acid  1,000 mcg Oral Daily    latanoprost  1 drop Right Eye Nightly    pantoprazole  40 mg Oral QAM AC    potassium chloride  10 mEq Oral BID WC    atorvastatin  10 mg Oral Daily    vitamin C  500 mg Oral Daily    vitamin D  3,000 Units Oral Daily    heparin (porcine)  5,000 Units SubCUTAneous 3 times per day    [Held by provider] furosemide  20 mg Oral Daily    [Held by provider] lisinopril  10 mg Oral Nightly    [Held by provider] spironolactone  37.5 mg Oral Daily     sodium chloride flush, 5-40 mL, PRN  sodium chloride, , PRN  ondansetron, 4 mg, Q8H PRN   Or  ondansetron, 4 mg, Q6H PRN  polyethylene glycol, 17 g, Daily PRN  acetaminophen, 650 mg, Q6H PRN   Or  acetaminophen, 650 mg, Q6H PRN  lactulose, 10 g, Daily PRN  HYDROcodone 5 mg - acetaminophen, 1 tablet, Q6H PRN         Objective:  BP (!) 153/74   Pulse 64   Temp 98.1 °F (36.7 °C) (Oral)   Resp 18   Ht 1.579 m (5' 2.17\")   Wt 61.2 kg (135 lb)   SpO2 96%   BMI 24.56 kg/m²     General Appearance: alert and oriented to person, place and time and in NAD, laying in bed  Skin: warm and dry  Head: normocephalic and atraumatic  Eyes: PERRL, EOMI, conjunctivae normal  Neck: neck supple, trachea

## 2025-02-26 NOTE — PROGRESS NOTES
4 Eyes Skin Assessment     NAME:  Ricci Mahajan  YOB: 1931  MEDICAL RECORD NUMBER:  [id number]    The patient is being assessed for  Transfer to New Unit    I agree that at least one RN has performed a thorough Head to Toe Skin Assessment on the patient. ALL assessment sites listed below have been assessed.      Areas assessed by both nurses:    Head, Face, Ears, Shoulders, Back, Chest, Arms, Elbows, Hands, Sacrum. Buttock, Coccyx, Ischium, and Legs. Feet and Heels        Does the Patient have a Wound? No noted wound(s)       Boby Prevention initiated by RN: Yes  Wound Care Orders initiated by RN: No    Pressure Injury (Stage 3,4, Unstageable, DTI, NWPT, and Complex wounds) if present, place Wound referral order by RN under : No    New Ostomies, if present place, Ostomy referral order under : No     Nurse 1 eSignature: Electronically signed by Marry Peters RN on 2/26/25 at 2:43 AM EST    **SHARE this note so that the co-signing nurse can place an eSignature**    Nurse 2 eSignature: {Esignature:[id number]}

## 2025-02-26 NOTE — CARE COORDINATION
Social Work:    Leeann at [facility] accepted Dr. 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 Leeann advised that she is updating patient & family and can arrange either ambulette or ambulance tomorrow if stable to transfer. (N-17, ambulance and ambulette completed)    Electronically signed by HAYLEY Denny on 2/26/2025 at 3:37 PM

## 2025-02-26 NOTE — CARE COORDINATION
Social Work:    Chart reviewed. 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 He is presently WBAT with wheeled walker.  Social service met with Eduardo BiggsKeyshawn, his wife Ramila, and son Eric, advised them about social work/case management role and discussed discharge planning. Dr. Mahajan resides with his wife and son in a 2-story home with 2-entry steps (w/HR), half bath on the main floor and stair lift up to his bedroom and full-bathroom (walk-in shower/high commode).  Eduardo Keyshawn has used home care in the past but cannot recall the company name. He has never used skilled rehab.  Social service advised  Keyshawn about [facility] vs snf or HHC/DME and preference is [facility]. Social service called a referral to Leeann carrillo. Await her evaluation.    Electronically signed by HAYLEY Denny on 2/26/2025 at 10:52 AM

## 2025-02-26 NOTE — PLAN OF CARE
Problem: ABCDS Injury Assessment  Goal: Absence of physical injury  2/26/2025 1225 by Maggie Jamison RN  Outcome: Progressing     Problem: Discharge Planning  Goal: Discharge to home or other facility with appropriate resources  2/26/2025 1225 by Maggie Jamison RN  Outcome: Progressing     Problem: Chronic Conditions and Co-morbidities  Goal: Patient's chronic conditions and co-morbidity symptoms are monitored and maintained or improved  2/26/2025 1225 by Maggie Jamison RN  Outcome: Progressing     Problem: Pain  Goal: Verbalizes/displays adequate comfort level or baseline comfort level  2/26/2025 1225 by Maggie Jamison RN  Outcome: Progressing     Problem: Safety - Adult  Goal: Free from fall injury  2/26/2025 1225 by Maggie Jamison RN  Outcome: Progressing     Problem: Skin/Tissue Integrity  Goal: Skin integrity remains intact  Description: 1.  Monitor for areas of redness and/or skin breakdown  2.  Assess vascular access sites hourly  3.  Every 4-6 hours minimum:  Change oxygen saturation probe site  4.  Every 4-6 hours:  If on nasal continuous positive airway pressure, respiratory therapy assess nares and determine need for appliance change or resting period  2/26/2025 1225 by Maggie Jamison RN  Outcome: Progressing

## 2025-02-26 NOTE — PROGRESS NOTES
Physical Therapy  Facility/Department: [address] ORTHO SURGERY  Physical Therapy Initial Assessment    Name: Ricci Mahajan  : 3/10/1931  MRN: [id number]  Date of Service: 2025        Patient Diagnosis(es): The primary encounter diagnosis was Closed fracture of left hip, initial encounter (HCC). A diagnosis of Elevated serum creatinine was also pertinent to this visit.  Past Medical History:  has a past medical history of Abnormal brain MRI, Episode of syncope, GI bleed, and Hypertension.  Past Surgical History:  has a past surgical history that includes hernia repair; joint replacement (Bilateral); Carpal tunnel release (Bilateral); eye surgery; back surgery; Upper gastrointestinal endoscopy (N/A, 2020); Colonoscopy (N/A, 2020); and IR EMBOLIZATION HEMORRHAGE (2020).         Requires PT Follow-Up: Yes    Evaluating Therapist: Kayce Nolasco PT     Referring Provider:      Kay Diamond APRN - CNP       PT order : PT eval and treat     Room #: 705  DIAGNOSIS: The primary encounter diagnosis was Closed fracture of left hip, initial encounter (HCC). A diagnosis of Elevated serum creatinine was also pertinent to this visit. - non operative treatment     PRECAUTIONS: falls, WBAT     Social:  Pt lives with  wife and son  in a  2 floor plan with stair glide to second, 2   steps and  B  rails to enter.  Prior to admission pt walked with  ww      Initial Evaluation  Date:  2025  Treatment      Short Term/ Long Term   Goals   Was pt agreeable to Eval/treatment?  Yes      Does pt have pain?  L hip      Bed Mobility  Rolling:  NT   Supine to sit:  mod assist   Sit to supine:  mod assist   Scooting:  min assist to EOB    Min assist    Transfers Sit to stand:  mod/max assist depending on surface   Stand to sit:  mod assist   Stand pivot:  mod/max assist    Min assist    Ambulation     5  feet with  ww  with  mod assist    25  feet with  ww  with  min assist        Stair negotiation: ascended and

## 2025-02-26 NOTE — ACP (ADVANCE CARE PLANNING)
Advance Care Planning   Healthcare Decision Maker:    Primary Decision Maker: JUANGRUPO HASSAN - Spouse - [phone]    Primary Decision Maker: JUANNIA - Child - [phone]    Click here to complete Healthcare Decision Makers including selection of the Healthcare Decision Maker Relationship (ie \"Primary\").

## 2025-02-26 NOTE — PATIENT CARE CONFERENCE
[facility] Quality Flow/Interdisciplinary Rounds Progress Note        Quality Flow Rounds held on February 26, 2025    Disciplines Attending:  Bedside Nurse, , , and Nursing Unit Leadership    Ricci Mahajan was admitted on 2/24/2025  1:49 PM    Anticipated Discharge Date:  Expected Discharge Date: 02/26/25    Disposition:    Boby Score:  Boby Scale Score: 16    [facility] RISK OF UNPLANNED READMISSION 2.0             15.1 Total Score        Discussed patient goal for the day, patient clinical progression, and barriers to discharge.  The following Goal(s) of the Day/Commitment(s) have been identified:   Discharge planning.      Verónica Lopez RN  February 26, 2025

## 2025-02-26 NOTE — PROGRESS NOTES
Occupational Therapy  OT BEDSIDE TREATMENT NOTE      Date:2025  Patient Name: Ricci Mahajan  MRN: [id number]  : 3/10/1931  Room: [address] [place] [address]      Evaluating OT: Karely Love OTR/L [id number]        Referring Provider:Kay Diamond APRN - CNP     Specific Provider Orders/Date: OT eval and treat 25       Diagnosis: Closed Hip Fracture     Surgery: none     Pertinent Medical History: HTN, B RUTH ANN,Back surgery,Hernia Repair           Precautions:  Fall Risk, WBAT LLE    Assessment of current deficits    [x] Functional mobility            [x]ADLs           [x] Strength                  []Cognition    [x] Functional transfers          [x] IADLs         [] Safety Awareness   [x]Endurance    [] Fine Coordination                         [x] Balance      [] Vision/perception   []Sensation      []Gross Motor Coordination             [] ROM           [] Delirium                   [] Motor Control      OT PLAN OF CARE   OT POC based on physician orders, patient diagnosis and results of clinical assessment     Frequency/Duration 2-5 days/wk for 2-4 weeks PRN   Specific OT Treatment Interventions to include:   * Instruction/training on adapted ADL techniques and AE recommendations to increase functional independence within precautions       * Training on energy conservation strategies, correct breathing pattern and techniques to improve independence/tolerance for self-care routine  * Functional transfer/mobility training/DME recommendations for increased independence, safety, and fall prevention  * Patient/Family education to increase follow through with safety techniques and functional independence  * Recommendation of environmental modifications for increased safety with functional transfers/mobility and ADLs  * Therapeutic exercise to improve motor endurance, ROM, and functional strength for ADLs/functional transfers  * Therapeutic activities to facilitate/challenge dynamic balance, stand tolerance for  Assist with ww    Balance Sitting:     Static:  Min A     Dynamic:NT  Standing: Max A  Min A static stand using walker for support during ADL.       Activity Tolerance Fair   poor   See comments.        Visual/  Perceptual Glasses: pt has glasses             Comments:  Pt pleasant and cooperative.  Assist for AdL and transfers.  Pt with complaint of dizziness once sitting in the chair.  BP 86/52 while seated.  Returned to bed due to BP.  100/53 once supine in the bed.   Nursing notified.     Education/treatment:  ADL retraining with facilitation of movement to increase self care skills. Therapeutic activity to address balance and endurance for ADL and transfers.  Pt education of walker safety, transfer safety, daily orientation.       Pt has made  progress towards set goals.       Time In: 905  Time Out: 930     Min Units   Therapeutic Ex 67107     Therapeutic Activities 09219 13 1   ADL/Self Care 92752 12 1   Orthotic Management 20047     Neuro Re-Ed 83414     Non-Billable Time     TOTAL TIMED TREATMENT 25 2         Rl HADLEY/L [id number]

## 2025-02-26 NOTE — DISCHARGE INSTR - COC
Continuity of Care Form    Patient Name: Ricci Martinez   :  3/10/1931  MRN:  [id number]    Admit date:  2025  Discharge date:  25    Code Status Order: DNR-CCA   Advance Directives:   Advance Care Flowsheet Documentation             Admitting Physician:  Tatyana Byrd MD  PCP: Scott Dowd MD    Discharging Nurse: Arline Adamsarging [facility] Unit/Room#: 0705/0705-A  Discharging Unit Phone Number: [phone]    Emergency Contact:   Extended Emergency Contact Information  Primary Emergency Contact: GRUPO MARTINEZ  Address: [address] [place] [address]  Home Phone: [phone]  Mobile Phone: [phone]  Relation: Spouse  Preferred language: English   needed? No  Secondary Emergency Contact: NIA MARTINEZ  Address: [address] [place] [address]  Home Phone: [phone]  Mobile Phone: [phone]  Relation: Child  Preferred language: English   needed? No    Past Surgical History:  Past Surgical History:   Procedure Laterality Date    BACK SURGERY      CARPAL TUNNEL RELEASE Bilateral     COLONOSCOPY N/A 2020    COLONOSCOPY DIAGNOSTIC performed by Juma Forte MD at [facility] OR    EYE SURGERY      HERNIA REPAIR      IR EMBOLIZATION HEMORRHAGE  2020    IR EMBOLIZATION HEMORRHAGE 2020 Shani Mahmood MD [facility]    JOINT REPLACEMENT Bilateral     hips    UPPER GASTROINTESTINAL ENDOSCOPY N/A 2020    EGD ESOPHAGOGASTRODUODENOSCOPY performed by Juma Forte MD at [facility] OR       Immunization History:   Immunization History   Administered Date(s) Administered    COVID-19, PFIZER PURPLE top, DILUTE for use, (age 12 y+), 30mcg/0.3mL 2021, 2021       Active Problems:  Patient Active Problem List   Diagnosis Code    Pneumonia due to organism J18.9    Chronic anemia D64.9    Essential hypertension I10    VALERIE (acute kidney injury) N17.9    Acute hyperglycemia  Discharge:   -monitor for resolution of VALERIE before resuming Lisinopril, Spironolactone, and Lasix  -hold Potassium until Lasix is resumed    Physician Certification: I certify the above information and transfer of Ricci Mahajan  is necessary for the continuing treatment of the diagnosis listed and that he requires Skilled Nursing Facility for less 30 days.     Update Admission H&P: No change in H&P    PHYSICIAN SIGNATURE:  Electronically signed by Kitty Lam MD on 2/27/25 at 8:36 AM EST

## 2025-02-27 VITALS
OXYGEN SATURATION: 97 % | HEART RATE: 65 BPM | WEIGHT: 140.6 LBS | HEIGHT: 62 IN | RESPIRATION RATE: 16 BRPM | SYSTOLIC BLOOD PRESSURE: 143 MMHG | DIASTOLIC BLOOD PRESSURE: 50 MMHG | BODY MASS INDEX: 25.88 KG/M2 | TEMPERATURE: 98.2 F

## 2025-02-27 LAB
ANION GAP SERPL CALCULATED.3IONS-SCNC: 9 MMOL/L (ref 7–16)
BASOPHILS # BLD: 0.1 K/UL (ref 0–0.2)
BASOPHILS NFR BLD: 1 % (ref 0–2)
BUN SERPL-MCNC: 23 MG/DL (ref 6–23)
CALCIUM SERPL-MCNC: 9.2 MG/DL (ref 8.6–10.2)
CHLORIDE SERPL-SCNC: 104 MMOL/L (ref 98–107)
CO2 SERPL-SCNC: 21 MMOL/L (ref 22–29)
CREAT SERPL-MCNC: 1.3 MG/DL (ref 0.7–1.2)
EOSINOPHIL # BLD: 0 K/UL (ref 0.05–0.5)
EOSINOPHILS RELATIVE PERCENT: 0 % (ref 0–6)
ERYTHROCYTE [DISTWIDTH] IN BLOOD BY AUTOMATED COUNT: 15.1 % (ref 11.5–15)
GFR, ESTIMATED: 50 ML/MIN/1.73M2
GLUCOSE SERPL-MCNC: 98 MG/DL (ref 74–99)
HCT VFR BLD AUTO: 34.4 % (ref 37–54)
HGB BLD-MCNC: 9.9 G/DL (ref 12.5–16.5)
LYMPHOCYTES NFR BLD: 1.53 K/UL (ref 1.5–4)
LYMPHOCYTES RELATIVE PERCENT: 14 % (ref 20–42)
MCH RBC QN AUTO: 27.6 PG (ref 26–35)
MCHC RBC AUTO-ENTMCNC: 28.8 G/DL (ref 32–34.5)
MCV RBC AUTO: 95.8 FL (ref 80–99.9)
MONOCYTES NFR BLD: 1.91 K/UL (ref 0.1–0.95)
MONOCYTES NFR BLD: 17 % (ref 2–12)
NEUTROPHILS NFR BLD: 68 % (ref 43–80)
NEUTS SEG NFR BLD: 7.46 K/UL (ref 1.8–7.3)
PLATELET # BLD AUTO: 343 K/UL (ref 130–450)
PMV BLD AUTO: 11.2 FL (ref 7–12)
POTASSIUM SERPL-SCNC: 5 MMOL/L (ref 3.5–5)
RBC # BLD AUTO: 3.59 M/UL (ref 3.8–5.8)
RBC # BLD: ABNORMAL 10*6/UL
SODIUM SERPL-SCNC: 134 MMOL/L (ref 132–146)
WBC OTHER # BLD: 11 K/UL (ref 4.5–11.5)

## 2025-02-27 PROCEDURE — 2500000003 HC RX 250 WO HCPCS

## 2025-02-27 PROCEDURE — 6360000002 HC RX W HCPCS: Performed by: STUDENT IN AN ORGANIZED HEALTH CARE EDUCATION/TRAINING PROGRAM

## 2025-02-27 PROCEDURE — 97530 THERAPEUTIC ACTIVITIES: CPT

## 2025-02-27 PROCEDURE — 36415 COLL VENOUS BLD VENIPUNCTURE: CPT

## 2025-02-27 PROCEDURE — 6370000000 HC RX 637 (ALT 250 FOR IP)

## 2025-02-27 PROCEDURE — 6370000000 HC RX 637 (ALT 250 FOR IP): Performed by: STUDENT IN AN ORGANIZED HEALTH CARE EDUCATION/TRAINING PROGRAM

## 2025-02-27 PROCEDURE — 99239 HOSP IP/OBS DSCHRG MGMT >30: CPT | Performed by: STUDENT IN AN ORGANIZED HEALTH CARE EDUCATION/TRAINING PROGRAM

## 2025-02-27 PROCEDURE — 80048 BASIC METABOLIC PNL TOTAL CA: CPT

## 2025-02-27 PROCEDURE — 85025 COMPLETE CBC W/AUTO DIFF WBC: CPT

## 2025-02-27 PROCEDURE — 97535 SELF CARE MNGMENT TRAINING: CPT

## 2025-02-27 RX ORDER — BISACODYL 10 MG
10 SUPPOSITORY, RECTAL RECTAL ONCE
Status: DISCONTINUED | OUTPATIENT
Start: 2025-02-27 | End: 2025-02-27 | Stop reason: HOSPADM

## 2025-02-27 RX ORDER — LACTULOSE 10 G/15ML
10 SOLUTION ORAL DAILY
Status: DISCONTINUED | OUTPATIENT
Start: 2025-02-27 | End: 2025-02-27 | Stop reason: HOSPADM

## 2025-02-27 RX ORDER — HYDROCODONE BITARTRATE AND ACETAMINOPHEN 5; 325 MG/1; MG/1
1 TABLET ORAL EVERY 6 HOURS PRN
Qty: 12 TABLET | Refills: 0 | Status: SHIPPED | OUTPATIENT
Start: 2025-02-27 | End: 2025-03-02

## 2025-02-27 RX ADMIN — HYDROCODONE BITARTRATE AND ACETAMINOPHEN 1 TABLET: 5; 325 TABLET ORAL at 13:58

## 2025-02-27 RX ADMIN — PANTOPRAZOLE SODIUM 40 MG: 40 TABLET, DELAYED RELEASE ORAL at 05:31

## 2025-02-27 RX ADMIN — HYDROCODONE BITARTRATE AND ACETAMINOPHEN 1 TABLET: 5; 325 TABLET ORAL at 05:33

## 2025-02-27 RX ADMIN — POTASSIUM CHLORIDE 10 MEQ: 750 TABLET, EXTENDED RELEASE ORAL at 08:24

## 2025-02-27 RX ADMIN — OXYCODONE HYDROCHLORIDE AND ACETAMINOPHEN 500 MG: 500 TABLET ORAL at 08:24

## 2025-02-27 RX ADMIN — SODIUM CHLORIDE, PRESERVATIVE FREE 10 ML: 5 INJECTION INTRAVENOUS at 08:25

## 2025-02-27 RX ADMIN — HEPARIN SODIUM 5000 UNITS: 5000 INJECTION INTRAVENOUS; SUBCUTANEOUS at 14:26

## 2025-02-27 RX ADMIN — POLYETHYLENE GLYCOL 3350 17 G: 17 POWDER, FOR SOLUTION ORAL at 08:25

## 2025-02-27 RX ADMIN — FOLIC ACID 1000 MCG: 1 TABLET ORAL at 08:24

## 2025-02-27 RX ADMIN — Medication 3000 UNITS: at 08:24

## 2025-02-27 RX ADMIN — FERROUS SULFATE TAB 325 MG (65 MG ELEMENTAL FE) 325 MG: 325 (65 FE) TAB at 08:24

## 2025-02-27 RX ADMIN — HEPARIN SODIUM 5000 UNITS: 5000 INJECTION INTRAVENOUS; SUBCUTANEOUS at 05:31

## 2025-02-27 RX ADMIN — ATORVASTATIN CALCIUM 10 MG: 10 TABLET, FILM COATED ORAL at 08:24

## 2025-02-27 RX ADMIN — ALLOPURINOL 200 MG: 100 TABLET ORAL at 08:23

## 2025-02-27 RX ADMIN — BISACODYL 5 MG: 5 TABLET, COATED ORAL at 08:23

## 2025-02-27 RX ADMIN — LACTULOSE 10 G: 10 SOLUTION ORAL at 10:56

## 2025-02-27 ASSESSMENT — PAIN SCALES - GENERAL
PAINLEVEL_OUTOF10: 4
PAINLEVEL_OUTOF10: 6
PAINLEVEL_OUTOF10: 7

## 2025-02-27 NOTE — PROGRESS NOTES
Spiritual Health History and Assessment/Progress Note  [facility]     Encounter, Attempted Encounter (Patient had been discharged.),  ,  ,      Name: Ricci Mahajan MRN: [id number]    Age: 89 y.o.     Sex: male   Language: English   Yarsani: Orthodox   Closed left hip fracture, initial encounter ([facility])     Date: 2/27/2025                           Spiritual Assessment began in [address] ORTHO SURGERY        Referral/Consult From: Rounding   Encounter Overview/Reason:  Encounter, Attempted Encounter (Patient had been discharged.)  Service Provided For: Patient    Susy, Belief, Meaning:   Patient unable to assess at this time  Family/Friends No family/friends present      Importance and Influence:  Patient unable to assess at this time  Family/Friends No family/friends present    Community:  Patient Other: Unable to assess  Family/Friends No family/friends present    Assessment and Plan of Care:     Patient Interventions include: Other: None  Family/Friends Interventions include: No family/friends present    Patient Plan of Care: No spiritual needs identified for follow-up  Family/Friends Plan of Care: No spiritual needs identified for follow-up    Electronically signed by Chaplain Archana on 2/27/2025 at 6:07 PM

## 2025-02-27 NOTE — PROGRESS NOTES
Occupational Therapy  OT BEDSIDE TREATMENT NOTE      Date:2025  Patient Name: Ricci Mahajan  MRN: [id number]  : 3/10/1931  Room: [address] [place] [address]      Evaluating OT: Karely Love OTR/L [id number]        Referring Provider:Kay Diamond APRN - CNP     Specific Provider Orders/Date: OT eval and treat 25       Diagnosis: Closed Hip Fracture     Surgery: none     Pertinent Medical History: HTN, B RUTH ANN,Back surgery,Hernia Repair           Precautions:  Fall Risk, WBAT LLE    Assessment of current deficits    [x] Functional mobility            [x]ADLs           [x] Strength                  []Cognition    [x] Functional transfers          [x] IADLs         [] Safety Awareness   [x]Endurance    [] Fine Coordination                         [x] Balance      [] Vision/perception   []Sensation      []Gross Motor Coordination             [] ROM           [] Delirium                   [] Motor Control      OT PLAN OF CARE   OT POC based on physician orders, patient diagnosis and results of clinical assessment     Frequency/Duration 2-5 days/wk for 2-4 weeks PRN   Specific OT Treatment Interventions to include:   * Instruction/training on adapted ADL techniques and AE recommendations to increase functional independence within precautions       * Training on energy conservation strategies, correct breathing pattern and techniques to improve independence/tolerance for self-care routine  * Functional transfer/mobility training/DME recommendations for increased independence, safety, and fall prevention  * Patient/Family education to increase follow through with safety techniques and functional independence  * Recommendation of environmental modifications for increased safety with functional transfers/mobility and ADLs  * Therapeutic exercise to improve motor endurance, ROM, and functional strength for ADLs/functional transfers  * Therapeutic activities to facilitate/challenge dynamic balance, stand tolerance for  increased safety and independence with ADLs  * Positioning to improve skin integrity, interaction with environment and functional independence     Recommended Adaptive Equipment:  TBD      Home Living: Pt lives with wife and son in a 1story home with 2 entry steps and a handrail    Bathroom setup: walk in shower    Equipment owned: rollator,ww, shower seat      Prior Level of Function: pt needed some assist  with ADLs , assist  with IADLs; ambulated with a rollator        Pain Level: left hip pain  Cognition: Awake and alert.  Cues for safety.                 Functional Assessment:  AM-PAC Daily Activity Raw Score: 12/24    Initial Eval Status  Date: 2/25/25 Treatment Status  Date: 2/27/25 STGs = LTGs  Time frame: 10-14 days   Feeding Supervision  Setup  while sitting in the chair.  Independent    Grooming Minimal Assist    Supervision    UB Dressing Moderate Assist - assist to thread LUE in sleeve due to limited ROM in shoulder  Mod A   Minimal Assist    LB Dressing Maximal Assist -assist to don slipper socks  Max A     Minimal Assist with AE as needed    Bathing NT   Moderate Assist    Toileting Maximal Assist -external catheter  Ext catheter.  Max A gray hygiene.   Minimal Assist    Bed Mobility  Supine to sit: Minimal Assist   Sit to supine: Minimal Assist  Mod A supine to sit   Supine to sit: Stand by Assist   Sit to supine: Stand by Assist    Functional Transfers Sit to stand: Mod/Max A  sit to stand edge of bed with ww   Stand Pivot:  NT    Mod A to stand.  Pt unable to remain standing during initial transfer.  Had to sit back onto the bed.    Stood again then able to pivot.   Minimal Assist with ww    Functional Mobility NT - pt with pain and decreased balance unable  to take steps   Mod A to take 1-2 steps to the chair.  Pt attempting to sit before reaching the chair.    Max A for balance and chair pulled up behind him.     Minimal Assist with ww    Balance Sitting:     Static:  Min A

## 2025-02-27 NOTE — DISCHARGE SUMMARY
[facility] Hospitalist Physician Discharge Summary       No follow-up provider specified.    Activity level: as tolerated    Dispo: SNF/rehab      Condition on discharge: stable    Patient ID:  Ricci Mahajan  [id number]  89 y.o.  3/10/1931    Admit date: 2/24/2025    Discharge date and time:  2/27/2025  5:46 PM    Admission Diagnoses: Principal Problem:    Closed left hip fracture, initial encounter ([facility])  Active Problems:    Chronic anemia    Essential hypertension    VALERIE (acute kidney injury)    HLD (hyperlipidemia)    Closed fracture of left hip ([facility])  Resolved Problems:    * No resolved hospital problems. *      Discharge Diagnoses: Principal Problem:    Closed left hip fracture, initial encounter ([facility])  Active Problems:    Chronic anemia    Essential hypertension    VALERIE (acute kidney injury)    HLD (hyperlipidemia)    Closed fracture of left hip ([facility])  Resolved Problems:    * No resolved hospital problems. *      Consults:  IP CONSULT TO INTERNAL MEDICINE  IP CONSULT TO ORTHOPEDIC SURGERY  IP CONSULT TO VASCULAR ACCESS TEAM    Procedures: None    Hospital Course:   Patient Ricci Mahajan is a 89 y.o. presented with Elevated serum creatinine [R79.89]  Closed fracture of left hip, initial encounter ([facility]) [S72.002A]  Closed left hip fracture, initial encounter ([facility]) [S72.002A]    Brief summary:  Patient presented with left hip fracture. Seen by Ortho, at this time plan for non-surgical mgmt. Notably with HoTN resulting in mild VALERIE on admit, BP did improve with gIVF, diuretics and antiHTN meds held and Cr improving at time of dispo, will need repeat labs at SNF to guide timing of medication resumption.     Patient otherwise remained stable during admission. Other issues addressed as below. Patient is being discharged to rehab in stable condition.    **Most recent hospitalist plan**  Plan:  Left hip fracture 2/2 mechanical fall- X-ray left hip and pelvis showed acute fracture of left proximal femoral  103 104   CO2 20* 21* 21*   BUN 25* 23 23   CREATININE 1.3* 1.4* 1.3*   GLUCOSE 95 96 98   CALCIUM 9.2 9.3 9.2       Recent Labs     02/25/25  0411 02/26/25  0825 02/27/25  0357   WBC 10.0 9.4 11.0   RBC 3.88 3.78* 3.59*   HGB 10.9* 10.8* 9.9*   HCT 37.3 36.4* 34.4*   MCV 96.1 96.3 95.8   MCH 28.1 28.6 27.6   MCHC 29.2* 29.7* 28.8*   RDW 15.2* 15.2* 15.1*    365 343   MPV 11.8 11.1 11.2       No results for input(s): \"POCGLU\" in the last 72 hours.    Imaging:  CT ABDOMEN PELVIS W IV CONTRAST Additional Contrast? None    Result Date: 2/24/2025  EXAMINATION: CT OF THE ABDOMEN AND PELVIS WITH CONTRAST; CT OF THE LEFT HIP WITHOUT CONTRAST 2/24/2025 5:05 pm TECHNIQUE: CT of the abdomen and pelvis was performed with the administration of intravenous contrast. Multiplanar reformatted images are provided for review. Automated exposure control, iterative reconstruction, and/or weight based adjustment of the mA/kV was utilized to reduce the radiation dose to as low as reasonably achievable.; CT of the left hip was performed without the administration of intravenous contrast.  Multiplanar reformatted images are provided for review. Automated exposure control, iterative reconstruction, and/or weight based adjustment of the mA/kV was utilized to reduce the radiation dose to as low as reasonably achievable. COMPARISON:  HISTORY: ORDERING SYSTEM PROVIDED HISTORY: abd pain, fall TECHNOLOGIST PROVIDED HISTORY: Reason for exam:->abd pain, fall Additional Contrast?->None Decision Support Exception - unselect if not a suspected or confirmed emergency medical condition->Emergency Medical Condition (MA); ORDERING SYSTEM PROVIDED HISTORY: fall, hip fracture TECHNOLOGIST PROVIDED HISTORY: Reason for exam:->fall, hip fracture FINDINGS: Lower Chest: Dependent atelectasis.  Moderate hiatal hernia.  Cardiomegaly Organs: No acute abnormality compared to previously.  Subtle exophytic lesion above water density at the lower lateral

## 2025-02-27 NOTE — PLAN OF CARE
Problem: ABCDS Injury Assessment  Goal: Absence of physical injury  2/27/2025 1453 by Arline Akhtar RN  Outcome: Adequate for Discharge  2/27/2025 0225 by Monica Oh RN  Outcome: Progressing     Problem: Discharge Planning  Goal: Discharge to home or other facility with appropriate resources  2/27/2025 1453 by Arline Akhtar RN  Outcome: Adequate for Discharge  2/27/2025 0225 by Monica Oh RN  Outcome: Progressing  Flowsheets (Taken 2/27/2025 0219)  Discharge to home or other facility with appropriate resources: Identify barriers to discharge with patient and caregiver     Problem: Chronic Conditions and Co-morbidities  Goal: Patient's chronic conditions and co-morbidity symptoms are monitored and maintained or improved  2/27/2025 1453 by Arline Akhtar RN  Outcome: Adequate for Discharge  2/27/2025 0225 by Monica Oh RN  Outcome: Progressing     Problem: Pain  Goal: Verbalizes/displays adequate comfort level or baseline comfort level  2/27/2025 1453 by Arline Akhtar RN  Outcome: Adequate for Discharge  2/27/2025 0225 by Monica Oh RN  Outcome: Progressing     Problem: Safety - Adult  Goal: Free from fall injury  2/27/2025 1453 by Arline Akhtar RN  Outcome: Adequate for Discharge  2/27/2025 0225 by Monica Oh RN  Outcome: Progressing     Problem: Skin/Tissue Integrity  Goal: Skin integrity remains intact  Description: 1.  Monitor for areas of redness and/or skin breakdown  2.  Assess vascular access sites hourly  3.  Every 4-6 hours minimum:  Change oxygen saturation probe site  4.  Every 4-6 hours:  If on nasal continuous positive airway pressure, respiratory therapy assess nares and determine need for appliance change or resting period  2/27/2025 1453 by Arline Akhtar RN  Outcome: Adequate for Discharge  2/27/2025 0225 by Monica Oh RN  Outcome: Progressing

## 2025-02-27 NOTE — PLAN OF CARE
Problem: ABCDS Injury Assessment  Goal: Absence of physical injury  2/27/2025 0225 by Monica Oh RN  Outcome: Progressing  2/26/2025 1225 by Maggie Jamison RN  Outcome: Progressing     Problem: Discharge Planning  Goal: Discharge to home or other facility with appropriate resources  2/27/2025 0225 by Monica Oh RN  Outcome: Progressing  Flowsheets (Taken 2/27/2025 0219)  Discharge to home or other facility with appropriate resources: Identify barriers to discharge with patient and caregiver  2/26/2025 1225 by Maggie Jamison RN  Outcome: Progressing     Problem: Chronic Conditions and Co-morbidities  Goal: Patient's chronic conditions and co-morbidity symptoms are monitored and maintained or improved  2/27/2025 0225 by Monica Oh RN  Outcome: Progressing  2/26/2025 1225 by Maggie Jamison RN  Outcome: Progressing     Problem: Pain  Goal: Verbalizes/displays adequate comfort level or baseline comfort level  2/27/2025 0225 by Monica Oh RN  Outcome: Progressing  2/26/2025 1225 by Maggie Jamison RN  Outcome: Progressing     Problem: Safety - Adult  Goal: Free from fall injury  2/27/2025 0225 by Monica Oh RN  Outcome: Progressing  2/26/2025 1225 by Maggie Jamison RN  Outcome: Progressing     Problem: Skin/Tissue Integrity  Goal: Skin integrity remains intact  Description: 1.  Monitor for areas of redness and/or skin breakdown  2.  Assess vascular access sites hourly  3.  Every 4-6 hours minimum:  Change oxygen saturation probe site  4.  Every 4-6 hours:  If on nasal continuous positive airway pressure, respiratory therapy assess nares and determine need for appliance change or resting period  2/27/2025 0225 by Monica Oh RN  Outcome: Progressing  2/26/2025 1225 by Maggie Jamison RN  Outcome: Progressing

## 2025-02-27 NOTE — PROGRESS NOTES
Dr. Lam notified of patient's complaint's of chest pain, new orders received. EKG report discussed and he is having Dr. Brewer come evaluate patient.    R/O Schizoaffective disorder   F25.9  R/O MDD (major depressive disorder)   F32.9

## 2025-02-27 NOTE — CARE COORDINATION
Social Work:    Leeann at [facility] arranged Cecilia menard to transport Mr. Mahajan to their hospital today at 2:00 p.m.   Keyshawn, his wife Ramila, and son Eric are aware.    Electronically signed by HAYLEY Denny on 2/27/2025 at 10:12 AM

## 2025-02-28 ENCOUNTER — OUTSIDE SERVICES (OUTPATIENT)
Dept: PHYSICAL MEDICINE AND REHAB | Age: 89
End: 2025-02-28
Payer: MEDICARE

## 2025-02-28 DIAGNOSIS — N17.9 AKI (ACUTE KIDNEY INJURY): ICD-10-CM

## 2025-02-28 DIAGNOSIS — I50.32 CHRONIC HEART FAILURE WITH PRESERVED EJECTION FRACTION (HCC): ICD-10-CM

## 2025-02-28 DIAGNOSIS — S72.002S CLOSED FRACTURE OF LEFT HIP, SEQUELA: ICD-10-CM

## 2025-02-28 DIAGNOSIS — K59.00 CONSTIPATION, UNSPECIFIED CONSTIPATION TYPE: Primary | ICD-10-CM

## 2025-02-28 NOTE — PROGRESS NOTES
Patient admitted to [facility]. All documentation is in [employer].    Riana Person MD  Board Certified Physical Medicine and Rehabilitation

## 2025-03-01 PROCEDURE — 99232 SBSQ HOSP IP/OBS MODERATE 35: CPT | Performed by: PHYSICAL MEDICINE & REHABILITATION

## 2025-03-03 PROCEDURE — 99233 SBSQ HOSP IP/OBS HIGH 50: CPT | Performed by: STUDENT IN AN ORGANIZED HEALTH CARE EDUCATION/TRAINING PROGRAM

## 2025-03-04 PROCEDURE — 99233 SBSQ HOSP IP/OBS HIGH 50: CPT | Performed by: STUDENT IN AN ORGANIZED HEALTH CARE EDUCATION/TRAINING PROGRAM

## 2025-03-05 PROCEDURE — 99233 SBSQ HOSP IP/OBS HIGH 50: CPT | Performed by: STUDENT IN AN ORGANIZED HEALTH CARE EDUCATION/TRAINING PROGRAM

## 2025-03-06 PROCEDURE — 99232 SBSQ HOSP IP/OBS MODERATE 35: CPT | Performed by: STUDENT IN AN ORGANIZED HEALTH CARE EDUCATION/TRAINING PROGRAM

## 2025-03-07 PROCEDURE — 99233 SBSQ HOSP IP/OBS HIGH 50: CPT | Performed by: STUDENT IN AN ORGANIZED HEALTH CARE EDUCATION/TRAINING PROGRAM

## 2025-03-10 PROCEDURE — 99233 SBSQ HOSP IP/OBS HIGH 50: CPT | Performed by: STUDENT IN AN ORGANIZED HEALTH CARE EDUCATION/TRAINING PROGRAM

## 2025-03-11 PROCEDURE — 99233 SBSQ HOSP IP/OBS HIGH 50: CPT | Performed by: STUDENT IN AN ORGANIZED HEALTH CARE EDUCATION/TRAINING PROGRAM

## 2025-03-12 PROCEDURE — 99233 SBSQ HOSP IP/OBS HIGH 50: CPT | Performed by: STUDENT IN AN ORGANIZED HEALTH CARE EDUCATION/TRAINING PROGRAM

## 2025-03-13 PROCEDURE — 99232 SBSQ HOSP IP/OBS MODERATE 35: CPT | Performed by: STUDENT IN AN ORGANIZED HEALTH CARE EDUCATION/TRAINING PROGRAM

## 2025-03-14 PROCEDURE — 99233 SBSQ HOSP IP/OBS HIGH 50: CPT | Performed by: STUDENT IN AN ORGANIZED HEALTH CARE EDUCATION/TRAINING PROGRAM

## 2025-03-15 PROCEDURE — 99232 SBSQ HOSP IP/OBS MODERATE 35: CPT | Performed by: STUDENT IN AN ORGANIZED HEALTH CARE EDUCATION/TRAINING PROGRAM

## 2025-03-16 PROCEDURE — 99239 HOSP IP/OBS DSCHRG MGMT >30: CPT | Performed by: STUDENT IN AN ORGANIZED HEALTH CARE EDUCATION/TRAINING PROGRAM

## 2025-03-19 ENCOUNTER — OFFICE VISIT (OUTPATIENT)
Dept: ORTHOPEDIC SURGERY | Age: 89
End: 2025-03-19
Payer: MEDICARE

## 2025-03-19 VITALS
TEMPERATURE: 97.4 F | OXYGEN SATURATION: 98 % | WEIGHT: 143 LBS | RESPIRATION RATE: 18 BRPM | BODY MASS INDEX: 26.31 KG/M2 | SYSTOLIC BLOOD PRESSURE: 144 MMHG | DIASTOLIC BLOOD PRESSURE: 55 MMHG | HEART RATE: 83 BPM | HEIGHT: 62 IN

## 2025-03-19 DIAGNOSIS — S72.002A CLOSED LEFT HIP FRACTURE, INITIAL ENCOUNTER (HCC): Primary | ICD-10-CM

## 2025-03-19 PROCEDURE — G8427 DOCREV CUR MEDS BY ELIG CLIN: HCPCS | Performed by: ORTHOPAEDIC SURGERY

## 2025-03-19 PROCEDURE — 1126F AMNT PAIN NOTED NONE PRSNT: CPT | Performed by: ORTHOPAEDIC SURGERY

## 2025-03-19 PROCEDURE — G8419 CALC BMI OUT NRM PARAM NOF/U: HCPCS | Performed by: ORTHOPAEDIC SURGERY

## 2025-03-19 PROCEDURE — 1159F MED LIST DOCD IN RCRD: CPT | Performed by: ORTHOPAEDIC SURGERY

## 2025-03-19 PROCEDURE — 1123F ACP DISCUSS/DSCN MKR DOCD: CPT | Performed by: ORTHOPAEDIC SURGERY

## 2025-03-19 PROCEDURE — 1036F TOBACCO NON-USER: CPT | Performed by: ORTHOPAEDIC SURGERY

## 2025-03-19 PROCEDURE — 99204 OFFICE O/P NEW MOD 45 MIN: CPT | Performed by: ORTHOPAEDIC SURGERY

## 2025-03-19 PROCEDURE — 1111F DSCHRG MED/CURRENT MED MERGE: CPT | Performed by: ORTHOPAEDIC SURGERY

## 2025-03-19 NOTE — PROGRESS NOTES
[facility]  ORTHOPAEDICS   DATE OF VISIT: 03/19/25  New  Patient     Referring Provider:   No referring provider defined for this encounter.    CHIEF COMPLAINT:   Chief Complaint   Patient presents with    Hip Pain     Left hip pain due to a fall on 02/24/25. Patient seen in ED and admitted until 02/27/25. Patient only has pain when standing.         HPI:    Mr. Vidal is a 89-year-old male here for follow-up after being hospitalized for a periprosthetic left hip fracture.  This was determined to be stable, involving the greater trochanter.  He was discharged to rehab and now is at home.  He has been somewhat wheelchair-bound but more recently been ambulating with a walker with mild discomfort.  Overall he feels he is improving        PAST MEDICAL HISTORY  Past Medical History:   Diagnosis Date    Abnormal brain MRI 03/26/2020    Episode of syncope 03/25/2020    GI bleed     FOBT negative 3/29/20    Hypertension        PAST SURGICAL HISTORY  Past Surgical History:   Procedure Laterality Date    BACK SURGERY      CARPAL TUNNEL RELEASE Bilateral     COLONOSCOPY N/A 4/26/2020    COLONOSCOPY DIAGNOSTIC performed by Juma Forte MD at [facility]    EYE SURGERY      HERNIA REPAIR      IR EMBOLIZATION HEMORRHAGE  8/9/2020    IR EMBOLIZATION HEMORRHAGE 8/9/2020 Shani Mahmood MD [facility]    JOINT REPLACEMENT Bilateral     hips    UPPER GASTROINTESTINAL ENDOSCOPY N/A 4/26/2020    EGD ESOPHAGOGASTRODUODENOSCOPY performed by Juma Forte MD at [facility]         FAMILY HISTORY   Family History   Problem Relation Age of Onset    Cancer Father     Heart Attack Brother        SOCIAL HISTORY  Social History     Socioeconomic History    Marital status:      Spouse name: Not on file    Number of children: Not on file    Years of education: Not on file    Highest education level: Not on file   Occupational History    Not on file   Tobacco Use    Smoking status: Former     Current packs/day: 0.00     Types:

## 2025-04-30 ENCOUNTER — OFFICE VISIT (OUTPATIENT)
Dept: ORTHOPEDIC SURGERY | Age: 89
End: 2025-04-30
Payer: MEDICARE

## 2025-04-30 VITALS
BODY MASS INDEX: 26.31 KG/M2 | DIASTOLIC BLOOD PRESSURE: 75 MMHG | OXYGEN SATURATION: 96 % | HEIGHT: 62 IN | RESPIRATION RATE: 18 BRPM | SYSTOLIC BLOOD PRESSURE: 147 MMHG | HEART RATE: 64 BPM | WEIGHT: 143 LBS | TEMPERATURE: 97.9 F

## 2025-04-30 DIAGNOSIS — S72.002A CLOSED LEFT HIP FRACTURE, INITIAL ENCOUNTER (HCC): Primary | ICD-10-CM

## 2025-04-30 PROCEDURE — 1159F MED LIST DOCD IN RCRD: CPT | Performed by: ORTHOPAEDIC SURGERY

## 2025-04-30 PROCEDURE — G8427 DOCREV CUR MEDS BY ELIG CLIN: HCPCS | Performed by: ORTHOPAEDIC SURGERY

## 2025-04-30 PROCEDURE — 1123F ACP DISCUSS/DSCN MKR DOCD: CPT | Performed by: ORTHOPAEDIC SURGERY

## 2025-04-30 PROCEDURE — 99213 OFFICE O/P EST LOW 20 MIN: CPT | Performed by: ORTHOPAEDIC SURGERY

## 2025-04-30 PROCEDURE — G8419 CALC BMI OUT NRM PARAM NOF/U: HCPCS | Performed by: ORTHOPAEDIC SURGERY

## 2025-04-30 PROCEDURE — 1036F TOBACCO NON-USER: CPT | Performed by: ORTHOPAEDIC SURGERY

## 2025-04-30 NOTE — PROGRESS NOTES
[facility]  ORTHOPAEDICS    Follow Up Visit     CHIEF COMPLAINT: No chief complaint on file.       Ricci Mahajan returns today for follow up visit regarding his left hip periprosthetic femoral fracture Josephine A.  He is doing very well.  He has been ambulating with no pain.         History of Present Illness                Physical Exam:     No data recorded    General: Alert and oriented x3, no acute distress  Cardiovascular/pulmonary: No labored breathing, peripheral perfusion intact  Musculoskeletal:    Physical Exam         Exam of the hip shows good range of motion of the hip.  There is no pain.  There is no tenderness laterally.      Controlled Substances Monitoring:      Imaging:    Results            X-rays show healing fracture around well-fixed stem            Assesment/Plan: Status post fall with left hip periprosthetic Josephine a fracture with stable implant    He is doing well.  He will continue with nonoperative treatment progress with range of motion and activities as tolerated.  Follow-up as needed    Assessment & Plan                 Jose Alberto Martínez MD  Orthopaedic Surgery   4/30/25      The patient (or guardian, if applicable) and other individuals in attendance with the patient were advised that Artificial Intelligence will be utilized during this visit to record, process the conversation to generate a clinical note, and support improvement of the AI technology. The patient (or guardian, if applicable) and other individuals in attendance at the appointment consented to the use of AI, including the recording.

## (undated) DEVICE — 4-PORT MANIFOLD: Brand: NEPTUNE 2

## (undated) DEVICE — FORCEPS BX L240CM DIA2.4MM L NDL RAD JAW 4 133340

## (undated) DEVICE — FORCEPS BX OVL CUP FEN L CAP DISPOSABLE 160CM L RAD JAW 4

## (undated) DEVICE — TOWEL,OR,DSP,ST,BLUE,STD,6/PK,12PK/CS: Brand: MEDLINE